# Patient Record
Sex: MALE | Race: WHITE | NOT HISPANIC OR LATINO | Employment: OTHER | URBAN - METROPOLITAN AREA
[De-identification: names, ages, dates, MRNs, and addresses within clinical notes are randomized per-mention and may not be internally consistent; named-entity substitution may affect disease eponyms.]

---

## 2023-09-02 PROBLEM — Z95.2 HISTORY OF AORTIC VALVE REPLACEMENT: Status: ACTIVE | Noted: 2023-09-02

## 2023-09-02 PROBLEM — I10 HYPERTENSION: Status: ACTIVE | Noted: 2023-09-02

## 2023-09-02 PROBLEM — I63.9 CEREBROVASCULAR ACCIDENT (CVA) (MULTI): Status: ACTIVE | Noted: 2023-09-02

## 2023-09-02 PROBLEM — M19.90 OSTEOARTHRITIS: Status: ACTIVE | Noted: 2023-09-02

## 2023-09-02 PROBLEM — H43.813 POSTERIOR VITREOUS DETACHMENT OF BOTH EYES: Status: ACTIVE | Noted: 2023-09-02

## 2023-09-02 PROBLEM — H25.813 MIXED TYPE AGE-RELATED CATARACT, BOTH EYES: Status: ACTIVE | Noted: 2023-09-02

## 2023-09-02 PROBLEM — D64.9 ANEMIA: Status: ACTIVE | Noted: 2023-09-02

## 2023-09-02 PROBLEM — Z97.3 PRESENCE OF SPECTACLES AND CONTACT LENSES: Status: ACTIVE | Noted: 2023-09-02

## 2023-09-02 PROBLEM — R00.2 PALPITATIONS: Status: ACTIVE | Noted: 2023-09-02

## 2023-09-02 PROBLEM — I35.0 AORTIC STENOSIS: Status: ACTIVE | Noted: 2023-09-02

## 2023-09-02 PROBLEM — E78.5 HYPERLIPIDEMIA: Status: ACTIVE | Noted: 2023-09-02

## 2023-09-02 PROBLEM — E78.00 HYPERCHOLESTEROLEMIA: Status: ACTIVE | Noted: 2023-09-02

## 2023-09-02 PROBLEM — T14.8XXA HEMATOMA: Status: ACTIVE | Noted: 2023-09-02

## 2023-09-02 PROBLEM — H25.9 AGE-RELATED CATARACT: Status: ACTIVE | Noted: 2023-09-02

## 2023-09-02 PROBLEM — H35.369 DRUSEN OF EYE: Status: ACTIVE | Noted: 2023-09-02

## 2023-09-02 PROBLEM — I35.9 AORTIC VALVE DISORDER: Status: ACTIVE | Noted: 2023-09-02

## 2023-09-02 PROBLEM — R73.03 PREDIABETES: Status: ACTIVE | Noted: 2023-09-02

## 2023-09-02 PROBLEM — R10.9 ABDOMINAL PAIN: Status: ACTIVE | Noted: 2023-09-02

## 2023-09-02 PROBLEM — R06.02 SHORTNESS OF BREATH: Status: ACTIVE | Noted: 2023-09-02

## 2023-09-02 PROBLEM — Z91.89 AT RISK FOR BLEEDING: Status: ACTIVE | Noted: 2023-09-02

## 2023-09-02 PROBLEM — H40.1190 POAG (PRIMARY OPEN-ANGLE GLAUCOMA): Status: ACTIVE | Noted: 2023-09-02

## 2023-09-02 PROBLEM — H40.003 GLAUCOMA SUSPECT, BOTH EYES: Status: ACTIVE | Noted: 2023-09-02

## 2023-09-02 PROBLEM — I48.91 ATRIAL FIBRILLATION (MULTI): Status: ACTIVE | Noted: 2023-09-02

## 2023-09-02 RX ORDER — WARFARIN 3 MG/1
3 TABLET ORAL
COMMUNITY
End: 2023-10-13

## 2023-09-02 RX ORDER — COLESEVELAM 180 1/1
1250 TABLET ORAL
COMMUNITY

## 2023-09-02 RX ORDER — PANTOPRAZOLE SODIUM 20 MG/1
20 TABLET, DELAYED RELEASE ORAL
COMMUNITY
End: 2023-10-13

## 2023-09-02 RX ORDER — WARFARIN SODIUM 5 MG/1
5 TABLET ORAL 3 TIMES WEEKLY
COMMUNITY
Start: 2013-04-26 | End: 2023-12-29 | Stop reason: HOSPADM

## 2023-09-02 RX ORDER — MULTIVITAMIN/IRON/FOLIC ACID 18MG-0.4MG
1 TABLET ORAL DAILY
COMMUNITY

## 2023-09-02 RX ORDER — ROSUVASTATIN CALCIUM 10 MG/1
5 TABLET, COATED ORAL NIGHTLY
COMMUNITY
End: 2023-10-13

## 2023-09-02 RX ORDER — METOPROLOL SUCCINATE 50 MG/1
50 TABLET, EXTENDED RELEASE ORAL DAILY
COMMUNITY
Start: 2013-04-08 | End: 2023-12-08 | Stop reason: DRUGHIGH

## 2023-09-02 RX ORDER — AMOXICILLIN AND CLAVULANATE POTASSIUM 875; 125 MG/1; MG/1
TABLET, FILM COATED ORAL
COMMUNITY
End: 2023-12-08 | Stop reason: ALTCHOICE

## 2023-09-02 RX ORDER — WARFARIN 2.5 MG/1
2.5 TABLET ORAL SEE ADMIN INSTRUCTIONS
Status: ON HOLD | COMMUNITY
End: 2023-12-29 | Stop reason: SDUPTHER

## 2023-09-02 RX ORDER — LOSARTAN POTASSIUM 100 MG/1
100 TABLET ORAL DAILY
COMMUNITY
Start: 2022-10-14

## 2023-09-11 PROBLEM — H26.9 CATARACT: Status: ACTIVE | Noted: 2023-09-11

## 2023-09-25 DIAGNOSIS — Z95.2 HISTORY OF AORTIC VALVE REPLACEMENT: Primary | ICD-10-CM

## 2023-09-25 DIAGNOSIS — Z79.01 LONG TERM (CURRENT) USE OF ANTICOAGULANTS: ICD-10-CM

## 2023-10-09 ENCOUNTER — ANTICOAGULATION - WARFARIN VISIT (OUTPATIENT)
Dept: CARDIOLOGY | Facility: CLINIC | Age: 88
End: 2023-10-09
Payer: MEDICARE

## 2023-10-09 DIAGNOSIS — Z79.01 LONG TERM (CURRENT) USE OF ANTICOAGULANTS: ICD-10-CM

## 2023-10-09 DIAGNOSIS — Z95.2 HISTORY OF AORTIC VALVE REPLACEMENT: Primary | ICD-10-CM

## 2023-10-09 LAB
POC INR: 3.3 (ref 2.5–3.5)
POC PROTHROMBIN TIME: NORMAL

## 2023-10-09 PROCEDURE — 99211 OFF/OP EST MAY X REQ PHY/QHP: CPT | Mod: 27 | Performed by: INTERNAL MEDICINE

## 2023-10-09 PROCEDURE — 85610 PROTHROMBIN TIME: CPT | Mod: QW

## 2023-10-13 ENCOUNTER — OFFICE VISIT (OUTPATIENT)
Dept: CARDIOLOGY | Facility: CLINIC | Age: 88
End: 2023-10-13
Payer: MEDICARE

## 2023-10-13 VITALS
WEIGHT: 171 LBS | OXYGEN SATURATION: 97 % | SYSTOLIC BLOOD PRESSURE: 123 MMHG | BODY MASS INDEX: 25.91 KG/M2 | HEART RATE: 73 BPM | DIASTOLIC BLOOD PRESSURE: 63 MMHG | HEIGHT: 68 IN

## 2023-10-13 DIAGNOSIS — T82.897D REGURGITATION OF PROSTHETIC VALVE, SUBSEQUENT ENCOUNTER: Primary | ICD-10-CM

## 2023-10-13 DIAGNOSIS — Z09 FOLLOW-UP VISIT FOR AORTIC VALVE REPLACEMENT WITH BIOPROSTHETIC VALVE: ICD-10-CM

## 2023-10-13 DIAGNOSIS — Z95.3 FOLLOW-UP VISIT FOR AORTIC VALVE REPLACEMENT WITH BIOPROSTHETIC VALVE: ICD-10-CM

## 2023-10-13 PROCEDURE — 1126F AMNT PAIN NOTED NONE PRSNT: CPT | Performed by: NURSE PRACTITIONER

## 2023-10-13 PROCEDURE — 1160F RVW MEDS BY RX/DR IN RCRD: CPT | Performed by: NURSE PRACTITIONER

## 2023-10-13 PROCEDURE — 1159F MED LIST DOCD IN RCRD: CPT | Performed by: NURSE PRACTITIONER

## 2023-10-13 PROCEDURE — 99214 OFFICE O/P EST MOD 30 MIN: CPT | Performed by: NURSE PRACTITIONER

## 2023-10-13 PROCEDURE — 1036F TOBACCO NON-USER: CPT | Performed by: NURSE PRACTITIONER

## 2023-10-13 PROCEDURE — 3074F SYST BP LT 130 MM HG: CPT | Performed by: NURSE PRACTITIONER

## 2023-10-13 PROCEDURE — 3078F DIAST BP <80 MM HG: CPT | Performed by: NURSE PRACTITIONER

## 2023-10-13 ASSESSMENT — PATIENT HEALTH QUESTIONNAIRE - PHQ9
1. LITTLE INTEREST OR PLEASURE IN DOING THINGS: NOT AT ALL
2. FEELING DOWN, DEPRESSED OR HOPELESS: NOT AT ALL
SUM OF ALL RESPONSES TO PHQ9 QUESTIONS 1 AND 2: 0

## 2023-10-13 ASSESSMENT — ENCOUNTER SYMPTOMS
MUSCULOSKELETAL NEGATIVE: 1
GASTROINTESTINAL NEGATIVE: 1
RESPIRATORY NEGATIVE: 1
CARDIOVASCULAR NEGATIVE: 1
NEUROLOGICAL NEGATIVE: 1
CONSTITUTIONAL NEGATIVE: 1
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
## 2023-11-06 ENCOUNTER — ANTICOAGULATION - WARFARIN VISIT (OUTPATIENT)
Dept: CARDIOLOGY | Facility: CLINIC | Age: 88
End: 2023-11-06
Payer: MEDICARE

## 2023-11-06 DIAGNOSIS — Z79.01 LONG TERM (CURRENT) USE OF ANTICOAGULANTS: ICD-10-CM

## 2023-11-06 DIAGNOSIS — Z95.2 HISTORY OF AORTIC VALVE REPLACEMENT: Primary | ICD-10-CM

## 2023-11-06 LAB
POC INR: 3
POC PROTHROMBIN TIME: NORMAL

## 2023-11-06 PROCEDURE — 99211 OFF/OP EST MAY X REQ PHY/QHP: CPT | Performed by: INTERNAL MEDICINE

## 2023-11-06 PROCEDURE — 85610 PROTHROMBIN TIME: CPT | Mod: QW

## 2023-11-06 NOTE — PROGRESS NOTES
Patient identification verified with 2 identifiers.    Location: [facility] - [address] [place], [place] [address] [phone]     Referring Physician: Dr. Anirudh Rehman  Enrollment/ Re-enrollment date: 5/22/2024   INR Goal: 2.5-3.5  INR monitoring is per [facility] protocol.  Anticoagulation Medication: warfarin  Indication: Aortic Valve replacement    Subjective   Bleeding signs/symptoms: No    Bruising: No   Major bleeding event: No  Thrombosis signs/symptoms: No  Thromboembolic event: No  Missed doses: No  Extra doses: No  Medication changes: No  Dietary changes: No  Change in health: No  Change in activity: No  Alcohol: No  Other concerns: No    Upcoming Surgeries:  Does the Patient Have any upcoming surgeries that require interruption in anticoagulation therapy? no  Does the patient require bridging? no      Anticoagulation Summary  As of 11/6/2023      INR goal:  2.5-3.5   TTR:  100.0 % (1.1 mo)   INR used for dosing:  3.00 (11/6/2023)   Weekly warfarin total:  25 mg               Assessment/Plan   Therapeutic     1. New dose: no change    2. Next INR: 1 month      Education provided to patient during the visit:  Patient instructed to call in interim with questions, concerns and changes.   Patient educated on interactions between medications and warfarin.   Patient educated on dietary consistency in vitamin k consumption.   Patient educated on affects of alcohol consumption while taking warfarin.   Patient educated on signs of bleeding/clotting.   Patient educated on compliance with dosing, follow up appointments, and prescribed plan of care.

## 2023-11-20 ENCOUNTER — LAB (OUTPATIENT)
Dept: LAB | Facility: LAB | Age: 88
End: 2023-11-20
Payer: MEDICARE

## 2023-11-20 DIAGNOSIS — E78.5 HYPERLIPIDEMIA, UNSPECIFIED: ICD-10-CM

## 2023-11-20 DIAGNOSIS — R73.03 PREDIABETES: ICD-10-CM

## 2023-11-20 DIAGNOSIS — I10 ESSENTIAL (PRIMARY) HYPERTENSION: ICD-10-CM

## 2023-11-20 DIAGNOSIS — M19.90 UNSPECIFIED OSTEOARTHRITIS, UNSPECIFIED SITE: Primary | ICD-10-CM

## 2023-11-20 LAB
ALBUMIN SERPL-MCNC: 4.5 G/DL (ref 3.5–5)
ALP BLD-CCNC: 98 U/L (ref 35–125)
ALT SERPL-CCNC: 18 U/L (ref 5–40)
ANION GAP SERPL CALC-SCNC: 15 MMOL/L
AST SERPL-CCNC: 23 U/L (ref 5–40)
BILIRUB SERPL-MCNC: 0.5 MG/DL (ref 0.1–1.2)
BUN SERPL-MCNC: 16 MG/DL (ref 8–25)
CALCIUM SERPL-MCNC: 9 MG/DL (ref 8.5–10.4)
CHLORIDE SERPL-SCNC: 106 MMOL/L (ref 97–107)
CHOLEST SERPL-MCNC: 188 MG/DL (ref 133–200)
CHOLEST/HDLC SERPL: 3.6 {RATIO}
CO2 SERPL-SCNC: 22 MMOL/L (ref 24–31)
CREAT SERPL-MCNC: 1.1 MG/DL (ref 0.4–1.6)
ERYTHROCYTE [DISTWIDTH] IN BLOOD BY AUTOMATED COUNT: 13.2 % (ref 11.5–14.5)
EST. AVERAGE GLUCOSE BLD GHB EST-MCNC: 105 MG/DL
GFR SERPL CREATININE-BSD FRML MDRD: 65 ML/MIN/1.73M*2
GLUCOSE SERPL-MCNC: 89 MG/DL (ref 65–99)
HBA1C MFR BLD: 5.3 %
HCT VFR BLD AUTO: 43.7 % (ref 41–52)
HDLC SERPL-MCNC: 52 MG/DL
HGB BLD-MCNC: 14.5 G/DL (ref 13.5–17.5)
LDLC SERPL CALC-MCNC: 114 MG/DL (ref 65–130)
MCH RBC QN AUTO: 31.3 PG (ref 26–34)
MCHC RBC AUTO-ENTMCNC: 33.2 G/DL (ref 32–36)
MCV RBC AUTO: 94 FL (ref 80–100)
NRBC BLD-RTO: 0 /100 WBCS (ref 0–0)
PLATELET # BLD AUTO: 269 X10*3/UL (ref 150–450)
POTASSIUM SERPL-SCNC: 4.3 MMOL/L (ref 3.4–5.1)
PROT SERPL-MCNC: 6.9 G/DL (ref 5.9–7.9)
RBC # BLD AUTO: 4.63 X10*6/UL (ref 4.5–5.9)
SODIUM SERPL-SCNC: 143 MMOL/L (ref 133–145)
TRIGL SERPL-MCNC: 110 MG/DL (ref 40–150)
WBC # BLD AUTO: 8 X10*3/UL (ref 4.4–11.3)

## 2023-11-20 PROCEDURE — 80061 LIPID PANEL: CPT

## 2023-11-20 PROCEDURE — 80053 COMPREHEN METABOLIC PANEL: CPT

## 2023-11-20 PROCEDURE — 83036 HEMOGLOBIN GLYCOSYLATED A1C: CPT

## 2023-11-20 PROCEDURE — 85027 COMPLETE CBC AUTOMATED: CPT

## 2023-11-20 PROCEDURE — 36415 COLL VENOUS BLD VENIPUNCTURE: CPT

## 2023-12-04 ENCOUNTER — APPOINTMENT (OUTPATIENT)
Dept: CARDIOLOGY | Facility: CLINIC | Age: 88
End: 2023-12-04
Payer: MEDICARE

## 2023-12-06 ENCOUNTER — ANTICOAGULATION - WARFARIN VISIT (OUTPATIENT)
Dept: CARDIOLOGY | Facility: CLINIC | Age: 88
End: 2023-12-06
Payer: MEDICARE

## 2023-12-06 DIAGNOSIS — Z95.2 HISTORY OF AORTIC VALVE REPLACEMENT: Primary | ICD-10-CM

## 2023-12-06 DIAGNOSIS — Z79.01 LONG TERM (CURRENT) USE OF ANTICOAGULANTS: ICD-10-CM

## 2023-12-06 LAB
POC INR: 6.1
POC PROTHROMBIN TIME: NORMAL

## 2023-12-06 PROCEDURE — 99211 OFF/OP EST MAY X REQ PHY/QHP: CPT | Mod: 27 | Performed by: INTERNAL MEDICINE

## 2023-12-06 PROCEDURE — 85610 PROTHROMBIN TIME: CPT | Mod: QW

## 2023-12-06 NOTE — PROGRESS NOTES
Patient identification verified with 2 identifiers.    Location: [facility] - [address] [place] [id number] [phone]     Referring Physician: Dr. KAYLEY Rehman  Enrollment/ Re-enrollment date: 2024   INR Goal: 2.5-3.5  INR monitoring is per [facility] protocol.  Anticoagulation Medication: warfarin  Indication:  Aortic Valve replacement    Subjective   Bleeding signs/symptoms: No    Bruising: No   Major bleeding event: No  Thrombosis signs/symptoms: No  Thromboembolic event: No  Missed doses: No  Extra doses: No  Medication changes: Yes  Cortisone shot 2 days ago.  Dietary changes: No  Change in health: No  Change in activity: No  Alcohol: No  Other concerns: No    Upcoming Surgeries:  Does the Patient Have any upcoming surgeries that require interruption in anticoagulation therapy? no  Does the patient require bridging? no      Anticoagulation Summary  As of 2023      INR goal:  2.5-3.5   TTR:  59.7 % (2.1 mo)   INR used for dosin.10 (2023)   Weekly warfarin total:  25 mg               Assessment/Plan   Supratherapeutic     1. New dose:  Hold  and      2. Next INR:  2 days.      Education provided to patient during the visit:  Patient instructed to call in interim with questions, concerns and changes.   Patient educated on interactions between medications and warfarin.   Patient educated on dietary consistency in vitamin k consumption.   Patient educated on affects of alcohol consumption while taking warfarin.   Patient educated on signs of bleeding/clotting.   Patient educated on compliance with dosing, follow up appointments, and prescribed plan of care.

## 2023-12-07 PROBLEM — I63.9 CEREBROVASCULAR ACCIDENT (CVA) (MULTI): Status: RESOLVED | Noted: 2023-09-02 | Resolved: 2023-12-07

## 2023-12-07 PROBLEM — T14.8XXA HEMATOMA: Status: RESOLVED | Noted: 2023-09-02 | Resolved: 2023-12-07

## 2023-12-07 PROBLEM — R00.2 PALPITATIONS: Status: RESOLVED | Noted: 2023-09-02 | Resolved: 2023-12-07

## 2023-12-07 PROBLEM — R06.02 SHORTNESS OF BREATH: Status: RESOLVED | Noted: 2023-09-02 | Resolved: 2023-12-07

## 2023-12-07 PROBLEM — R10.9 ABDOMINAL PAIN: Status: RESOLVED | Noted: 2023-09-02 | Resolved: 2023-12-07

## 2023-12-07 NOTE — PROGRESS NOTES
[facility]: [facility]  MEDICARE WELLNESS EXAM      Jose Martin Fleming is a 88 y.o. male that is presenting today for Annual Exam.    Assessment/Plan    Diagnoses and all orders for this visit:  Annual physical exam  Chronic atrial fibrillation (CMS/HCC)  Hypercholesterolemia  Primary hypertension  Primary osteoarthritis involving multiple joints  Aortic valve disorder  Prediabetes  Long term (current) use of anticoagulants  Aortic valve stenosis, etiology of cardiac valve disease unspecified  Anemia, unspecified type  History of CVA (cerebrovascular accident) without residual deficits  We completed the medicare wellness exam today.  The lifestyle is reviewed and recommendations for diet and exercise are made. We reviewed the immunizations and cancer screening and recommendations for needed immunizations and screenings are made.  The patient is independent in all ADL, shows no clinical signs of cognitive impairment, and is not falling.    We reviewed his immunizations together.  He had a he had Prevnar 20 and 2022 Shingrix x 2 in 2019 Tdap in 2018 and a recent COVID-vaccine.  He will get his flu shot today but otherwise he is up-to-date on immunizations other than the need for an RSV vaccine.  We also reviewed the blood work today which really looks quite excellent.    There were 2 interesting findings.  First of all [facility] they had done an ankle brachial index which was 0.84 on the right and 1.0 on the left.  Based on this they diagnosed him with peripheral vascular disease.  We have talked about this together.  Carefully he really has no symptoms of claudication in the legs.  Therefore no further intervention will be needed.    Also we noted that his heart rate in the office today was in the 120s.  His EKG shows the rhythm is A-fib but he is really totally asymptomatic in that regard right now.  He only is taking metoprolol XL 50 mg  daily so the simplest maneuver here would be to increase this to 100 mg daily.  He will be keeping his follow-up with . Ori will let us know if there is any problems.    Unless there are other issues I will just plan on seeing him in a year for his annual wellness visit.    ADVANCED CARE PLANNING  Advanced Care Planning was discussed with patient:  The patient has an active advanced care plan on file. The patient has an active surrogate decision-maker on file.  Encouraged the patient to confirm that Living Will and Healthcare Power of  (HCPoA) are accurate and up to date.  Encouraged the patient to confirm that our office be provided a copy of any documentation in the event that anything changes.      ACTIVITIES OF DAILY LIVING  Basic ADLs:  Bathing: Independent, Dressing: Independent, Toileting: Independent, Transferring: Independent, Continence: Independent, Feeding: Independent.    Instrumental ADLs:  Ability to use phone: Independent, Shopping: Independent, Cooking: Independent, House-keeping: Independent, Laundry: Independent, Transportation: Independent, Medication Management: Independent, Finance Management: Independent.    Subjective   Here for medicare wellness exam and follow up - doing prety well right now - also sees dr garcia regularly and cgoes to the coumadin clinic      Review of Systems   Constitutional: Negative.    HENT: Negative.     Eyes: Negative.    Respiratory: Negative.     Cardiovascular: Negative.    Gastrointestinal: Negative.    Endocrine: Negative.    Genitourinary: Negative.    Musculoskeletal:  Positive for arthralgias.        Knee pain - just had injection for such   Skin: Negative.    Allergic/Immunologic: Negative.    Neurological: Negative.    Hematological: Negative.    Psychiatric/Behavioral: Negative.     All other systems reviewed and are negative.    Objective   Vitals:    12/08/23 0735   BP: 130/81   Pulse: (!) 127   SpO2: 95%      Body mass index is 25.54  kg/m².  Physical Exam  Vitals and nursing note reviewed.   Constitutional:       General: He is not in acute distress.     Appearance: Normal appearance. He is not ill-appearing.   HENT:      Head: Normocephalic and atraumatic.      Right Ear: Tympanic membrane, ear canal and external ear normal. There is no impacted cerumen.      Left Ear: Tympanic membrane, ear canal and external ear normal. There is no impacted cerumen.      Nose: Nose normal.      Mouth/Throat:      Mouth: Mucous membranes are moist.      Pharynx: Oropharynx is clear. No oropharyngeal exudate or posterior oropharyngeal erythema.   Eyes:      General: No scleral icterus.        Right eye: No discharge.         Left eye: No discharge.      Extraocular Movements: Extraocular movements intact.      Conjunctiva/sclera: Conjunctivae normal.      Pupils: Pupils are equal, round, and reactive to light.   Neck:      Vascular: No carotid bruit.   Cardiovascular:      Rate and Rhythm: Normal rate and regular rhythm.      Pulses: Normal pulses.      Heart sounds: Normal heart sounds. No murmur heard.     No friction rub. No gallop.   Pulmonary:      Effort: Pulmonary effort is normal. No respiratory distress.      Breath sounds: Normal breath sounds.   Abdominal:      General: Abdomen is flat. Bowel sounds are normal.      Palpations: Abdomen is soft.      Tenderness: There is no abdominal tenderness.      Hernia: No hernia is present.   Musculoskeletal:         General: No swelling. Normal range of motion.      Cervical back: Normal range of motion.   Lymphadenopathy:      Cervical: No cervical adenopathy.   Skin:     General: Skin is warm and dry.      Coloration: Skin is not jaundiced.      Findings: No rash.   Neurological:      General: No focal deficit present.      Mental Status: He is alert and oriented to person, place, and time. Mental status is at baseline.   Psychiatric:         Mood and Affect: Mood normal.         Behavior: Behavior normal.  "      Diagnostic Results   Lab Results   Component Value Date    GLUCOSE 89 11/20/2023    CALCIUM 9.0 11/20/2023     11/20/2023    K 4.3 11/20/2023    CO2 22 (L) 11/20/2023     11/20/2023    BUN 16 11/20/2023    CREATININE 1.10 11/20/2023     Lab Results   Component Value Date    ALT 18 11/20/2023    AST 23 11/20/2023    ALKPHOS 98 11/20/2023    BILITOT 0.5 11/20/2023     Lab Results   Component Value Date    WBC 8.0 11/20/2023    HGB 14.5 11/20/2023    HCT 43.7 11/20/2023    MCV 94 11/20/2023     11/20/2023     Lab Results   Component Value Date    CHOL 188 11/20/2023    CHOL 188 12/01/2022    CHOL 217 (H) 11/30/2021     Lab Results   Component Value Date    HDL 52.0 11/20/2023    HDL 44 12/01/2022    HDL 47 11/30/2021     Lab Results   Component Value Date    LDLCALC 114 11/20/2023    LDLCALC 123 12/01/2022    LDLCALC 147 (H) 11/30/2021     Lab Results   Component Value Date    TRIG 110 11/20/2023    TRIG 103 12/01/2022    TRIG 115 11/30/2021     No components found for: \"CHOLHDL\"  Lab Results   Component Value Date    HGBA1C 5.3 11/20/2023     Other labs not included in the list above reviewed either before or during this encounter.    History   Past Medical History:   Diagnosis Date    Anemia 09/02/2023    Aortic stenosis 09/02/2023    Aortic valve disorder 09/02/2023    At risk for bleeding 09/02/2023    Atrial fibrillation (CMS/HCC) 09/02/2023    Cerebrovascular accident (CVA) (CMS/HCC) 09/02/2023    History of aortic valve replacement 09/02/2023    Hypercholesterolemia 09/02/2023    Hypertension 09/02/2023    Long term (current) use of anticoagulants 09/25/2023    Personal history of other diseases of the circulatory system 07/18/2014    History of aortic valve disorder    Personal history of other diseases of the nervous system and sense organs     History of cataract    Prediabetes 09/02/2023    Preglaucoma, unspecified, unspecified eye     Glaucoma suspect     Past Surgical History: "   Procedure Laterality Date    ANKLE FRACTURE SURGERY  1989    APPENDECTOMY  10/08/2013    Appendectomy    COLONOSCOPY  2010    IR EMBOLIZATION  09/09/2021    IR EMBOLIZATION [facility]    MECHANICAL AORTIC VALVE REPLACEMENT  1985    [facility]    TOTAL HIP ARTHROPLASTY Left 1999    TOTAL HIP ARTHROPLASTY Right 2005     Family History   Problem Relation Name Age of Onset    Heart attack Father      Sudden death Father          Cardiac    Diabetes Brother       Social History     Socioeconomic History    Marital status:      Spouse name: Not on file    Number of children: Not on file    Years of education: Not on file    Highest education level: Not on file   Occupational History    Not on file   Tobacco Use    Smoking status: Former     Packs/day: 1.00     Years: 53.00     Additional pack years: 0.00     Total pack years: 53.00     Types: Cigarettes    Smokeless tobacco: Never   Substance and Sexual Activity    Alcohol use: Not Currently    Drug use: Never    Sexual activity: Not on file   Other Topics Concern    Not on file   Social History Narrative    Not on file     Social Determinants of Health     Financial Resource Strain: Not on file   Food Insecurity: Not on file   Transportation Needs: Not on file   Physical Activity: Not on file   Stress: Not on file   Social Connections: Not on file   Intimate Partner Violence: Not on file   Housing Stability: Not on file     Allergies   Allergen Reactions    Rosuvastatin Other     Muscle aches     Current Outpatient Medications on File Prior to Visit   Medication Sig Dispense Refill    aspirin (ASPIR-81 ORAL) Take 1 tablet by mouth once daily.      b complex 0.4 mg tablet Take 1 tablet by mouth once daily.      clotrimazole-betamethasone (Lotrisone) cream Apply 1 Application topically 2 times a day.      colesevelam (WelChoL) 625 mg tablet Take 2 tablets (1,250 mg) by mouth once daily at noon. Take with meals.      losartan (Cozaar) 100 mg tablet Take 1  tablet (100 mg) by mouth once daily.      metoprolol succinate XL (Toprol XL) 50 mg 24 hr tablet Take 1 tablet (50 mg) by mouth once daily.      warfarin (Coumadin) 2.5 mg tablet Take 1 tablet (2.5 mg) by mouth see administration instructions. 1 tablet oral every Tuesday, Thursday, Saturday, Sunday      warfarin (Coumadin) 5 mg tablet Take 1 tablet (5 mg) by mouth 3 (three) times a week.      ZINC ACETATE ORAL Take 1 tablet by mouth once daily.      [DISCONTINUED] amoxicillin-pot clavulanate (Augmentin) 875-125 mg tablet       [DISCONTINUED] ergocalciferol, vitamin D2, (VITAMIN D2 ORAL) Take 1 tablet by mouth once daily.       No current facility-administered medications on file prior to visit.     Immunization History   Administered Date(s) Administered    Flu vaccine, quadrivalent, high-dose, preservative free, age 65y+ (FLUZONE) 09/24/2020, 12/01/2021, 12/02/2022    Influenza, High Dose Seasonal, Preservative Free 09/10/2013, 11/11/2016, 10/04/2017, 11/19/2018, 11/20/2019    Influenza, seasonal, injectable 10/15/2014, 10/08/2015    Moderna SARS-CoV-2 Booster 09/20/2023    Moderna SARS-CoV-2 Vaccination 01/20/2021, 02/17/2021    Pfizer COVID-19 vaccine, bivalent, age 12 years and older (30 mcg/0.3 mL) 09/09/2022    Pfizer Gray Cap SARS-CoV-2 04/06/2022    Pfizer Purple Cap SARS-CoV-2 10/25/2021    Pneumococcal conjugate vaccine, 13-valent (PREVNAR 13) 11/27/2015    Pneumococcal conjugate vaccine, 20-valent (PREVNAR 20) 12/02/2022    Pneumococcal polysaccharide vaccine, 23-valent, age 2 years and older (PNEUMOVAX 23) 06/18/2009, 10/15/2014    Td vaccine, age 7 years and older (TDVAX) 06/16/2008    Tdap vaccine, age 7 year and older (BOOSTRIX) 11/19/2018    Zoster vaccine, recombinant, adult (SHINGRIX) 06/05/2019, 08/14/2019    Zoster, live 06/24/2009     Patient's medical history was reviewed and updated either before or during this encounter.     Ryan Gonzalez MD

## 2023-12-08 ENCOUNTER — ANTICOAGULATION - WARFARIN VISIT (OUTPATIENT)
Dept: CARDIOLOGY | Facility: CLINIC | Age: 88
End: 2023-12-08
Payer: MEDICARE

## 2023-12-08 ENCOUNTER — OFFICE VISIT (OUTPATIENT)
Dept: PRIMARY CARE | Facility: CLINIC | Age: 88
End: 2023-12-08
Payer: MEDICARE

## 2023-12-08 VITALS
BODY MASS INDEX: 25.54 KG/M2 | SYSTOLIC BLOOD PRESSURE: 130 MMHG | WEIGHT: 168 LBS | DIASTOLIC BLOOD PRESSURE: 81 MMHG | HEART RATE: 127 BPM | OXYGEN SATURATION: 95 %

## 2023-12-08 DIAGNOSIS — I48.20 CHRONIC ATRIAL FIBRILLATION (MULTI): ICD-10-CM

## 2023-12-08 DIAGNOSIS — Z95.2 HISTORY OF AORTIC VALVE REPLACEMENT: Primary | ICD-10-CM

## 2023-12-08 DIAGNOSIS — Z01.89 ENCOUNTER FOR ROUTINE LABORATORY TESTING: ICD-10-CM

## 2023-12-08 DIAGNOSIS — Z00.00 ANNUAL PHYSICAL EXAM: Primary | ICD-10-CM

## 2023-12-08 DIAGNOSIS — E78.00 HYPERCHOLESTEROLEMIA: ICD-10-CM

## 2023-12-08 DIAGNOSIS — I35.0 AORTIC VALVE STENOSIS, ETIOLOGY OF CARDIAC VALVE DISEASE UNSPECIFIED: ICD-10-CM

## 2023-12-08 DIAGNOSIS — Z79.01 LONG TERM (CURRENT) USE OF ANTICOAGULANTS: ICD-10-CM

## 2023-12-08 DIAGNOSIS — R73.03 PREDIABETES: ICD-10-CM

## 2023-12-08 DIAGNOSIS — D64.9 ANEMIA, UNSPECIFIED TYPE: ICD-10-CM

## 2023-12-08 DIAGNOSIS — I10 PRIMARY HYPERTENSION: ICD-10-CM

## 2023-12-08 DIAGNOSIS — I35.9 AORTIC VALVE DISORDER: ICD-10-CM

## 2023-12-08 DIAGNOSIS — R73.9 HYPERGLYCEMIA: ICD-10-CM

## 2023-12-08 DIAGNOSIS — M15.9 PRIMARY OSTEOARTHRITIS INVOLVING MULTIPLE JOINTS: ICD-10-CM

## 2023-12-08 DIAGNOSIS — I73.9 PERIPHERAL VASCULAR DISEASE (CMS-HCC): ICD-10-CM

## 2023-12-08 DIAGNOSIS — Z86.73 HISTORY OF CVA (CEREBROVASCULAR ACCIDENT) WITHOUT RESIDUAL DEFICITS: ICD-10-CM

## 2023-12-08 LAB
POC INR: 2.6
POC PROTHROMBIN TIME: NORMAL

## 2023-12-08 PROCEDURE — 85610 PROTHROMBIN TIME: CPT | Mod: QW

## 2023-12-08 PROCEDURE — 3079F DIAST BP 80-89 MM HG: CPT | Performed by: INTERNAL MEDICINE

## 2023-12-08 PROCEDURE — 99211 OFF/OP EST MAY X REQ PHY/QHP: CPT | Mod: 27 | Performed by: INTERNAL MEDICINE

## 2023-12-08 PROCEDURE — 1036F TOBACCO NON-USER: CPT | Performed by: INTERNAL MEDICINE

## 2023-12-08 PROCEDURE — 1160F RVW MEDS BY RX/DR IN RCRD: CPT | Performed by: INTERNAL MEDICINE

## 2023-12-08 PROCEDURE — 3075F SYST BP GE 130 - 139MM HG: CPT | Performed by: INTERNAL MEDICINE

## 2023-12-08 PROCEDURE — 1159F MED LIST DOCD IN RCRD: CPT | Performed by: INTERNAL MEDICINE

## 2023-12-08 PROCEDURE — G0439 PPPS, SUBSEQ VISIT: HCPCS | Performed by: INTERNAL MEDICINE

## 2023-12-08 PROCEDURE — 93010 ELECTROCARDIOGRAM REPORT: CPT | Performed by: INTERNAL MEDICINE

## 2023-12-08 PROCEDURE — G0008 ADMIN INFLUENZA VIRUS VAC: HCPCS | Performed by: INTERNAL MEDICINE

## 2023-12-08 PROCEDURE — 1125F AMNT PAIN NOTED PAIN PRSNT: CPT | Performed by: INTERNAL MEDICINE

## 2023-12-08 PROCEDURE — 90662 IIV NO PRSV INCREASED AG IM: CPT | Performed by: INTERNAL MEDICINE

## 2023-12-08 RX ORDER — METOPROLOL SUCCINATE 50 MG/1
100 TABLET, EXTENDED RELEASE ORAL DAILY
Qty: 180 TABLET | Refills: 3 | Status: SHIPPED | OUTPATIENT
Start: 2023-12-08 | End: 2023-12-29 | Stop reason: HOSPADM

## 2023-12-08 RX ORDER — CLOTRIMAZOLE AND BETAMETHASONE DIPROPIONATE 10; .64 MG/G; MG/G
1 CREAM TOPICAL 2 TIMES DAILY
COMMUNITY

## 2023-12-08 ASSESSMENT — PATIENT HEALTH QUESTIONNAIRE - PHQ9
SUM OF ALL RESPONSES TO PHQ9 QUESTIONS 1 AND 2: 0
1. LITTLE INTEREST OR PLEASURE IN DOING THINGS: NOT AT ALL
2. FEELING DOWN, DEPRESSED OR HOPELESS: NOT AT ALL

## 2023-12-08 ASSESSMENT — PAIN SCALES - GENERAL: PAINLEVEL: 2

## 2023-12-08 ASSESSMENT — ENCOUNTER SYMPTOMS
DEPRESSION: 0
OCCASIONAL FEELINGS OF UNSTEADINESS: 1
ARTHRALGIAS: 1
GASTROINTESTINAL NEGATIVE: 1
EYES NEGATIVE: 1
PSYCHIATRIC NEGATIVE: 1
CONSTITUTIONAL NEGATIVE: 1
LOSS OF SENSATION IN FEET: 0
NEUROLOGICAL NEGATIVE: 1
RESPIRATORY NEGATIVE: 1
CARDIOVASCULAR NEGATIVE: 1
HEMATOLOGIC/LYMPHATIC NEGATIVE: 1
ALLERGIC/IMMUNOLOGIC NEGATIVE: 1
ENDOCRINE NEGATIVE: 1

## 2023-12-08 NOTE — PATIENT INSTRUCTIONS
We were able to complete your annual wellness visit today.  As you know we reviewed your immunizations.  You had urine Prevnar 20 pneumonia vaccine in 2022 and your shingles vaccines in 2019.  These vaccines are completed now for life as the recommendations stand.  In terms of your Tdap which is your tetanus and whooping cough vaccine you had that in 2018 and would not be due for that again until 2028.  I know you said you recently got your COVID-vaccine which is great and we will give you a flu shot today.  You could investigate getting the RSV vaccine at your local pharmacy    In terms of your chronic medical problems it looks like you are doing really well.  Your blood work all looks great with no changes needed.  Because your heart rate was fine as we discussed lets go ahead and increase your metoprolol XL 50 mg from 1 pill daily to 2 pills daily.  This should help keep that heart rate down under 100 hopefully.  Please let us know if this is causing any low heart rates or any other symptoms such as dizziness or fatigue.  Dr. Rehman would be very interested in that as well I will plan on seeing you next year unless he needs me.

## 2023-12-08 NOTE — PROGRESS NOTES
Patient identification verified with 2 identifiers.    Location: [facility] - [address] [place] [id number] [phone]     Referring Physician: Dr. Anirudh Rehman  Enrollment/ Re-enrollment date: 2024   INR Goal: 2.5-3.5  INR monitoring is per [facility] protocol.  Anticoagulation Medication: warfarin  Indication: Aortic Valve Replacement    Subjective   Bleeding signs/symptoms: No    Bruising: No   Major bleeding event: No  Thrombosis signs/symptoms: No  Thromboembolic event: No  Missed doses: No  Extra doses: No  Medication changes: No  Dietary changes: No  Change in health: No  Change in activity: No  Alcohol: No  Other concerns: No    Upcoming Surgeries:  Does the Patient Have any upcoming surgeries that require interruption in anticoagulation therapy? no  Does the patient require bridging? no      Anticoagulation Summary  As of 2023      INR goal:  2.5-3.5   TTR:  58.5 % (2.1 mo)   INR used for dosin.60 (2023)   Weekly warfarin total:  25 mg               Assessment/Plan   Therapeutic     1. New dose: no change    2. Next INR: 1 week      Education provided to patient during the visit:  Patient instructed to call in interim with questions, concerns and changes.   Patient educated on interactions between medications and warfarin.   Patient educated on dietary consistency in vitamin k consumption.   Patient educated on affects of alcohol consumption while taking warfarin.   Patient educated on signs of bleeding/clotting.   Patient educated on compliance with dosing, follow up appointments, and prescribed plan of care.

## 2023-12-18 ENCOUNTER — ANTICOAGULATION - WARFARIN VISIT (OUTPATIENT)
Dept: CARDIOLOGY | Facility: CLINIC | Age: 88
End: 2023-12-18
Payer: MEDICARE

## 2023-12-18 DIAGNOSIS — Z79.01 LONG TERM (CURRENT) USE OF ANTICOAGULANTS: ICD-10-CM

## 2023-12-18 DIAGNOSIS — Z95.2 HISTORY OF AORTIC VALVE REPLACEMENT: Primary | ICD-10-CM

## 2023-12-18 LAB
POC INR: 8
POC PROTHROMBIN TIME: NORMAL

## 2023-12-18 PROCEDURE — 99211 OFF/OP EST MAY X REQ PHY/QHP: CPT | Performed by: INTERNAL MEDICINE

## 2023-12-18 PROCEDURE — 85610 PROTHROMBIN TIME: CPT | Mod: QW

## 2023-12-18 NOTE — PROGRESS NOTES
Patient identification verified with 2 identifiers.    Location: [facility] - [address] [place], [place] [address] [phone]     Referring Physician: Dr. Anirudh Rehman  Enrollment/ Re-enrollment date: 5/22/2024   INR Goal: 2.5-3.5  INR monitoring is per [facility] protocol.  Anticoagulation Medication: warfarin  Indication: Aortic Valve Replacement    Subjective   Bleeding signs/symptoms: No    Bruising: No   Major bleeding event: No  Thrombosis signs/symptoms: No  Thromboembolic event: No  Missed doses: No  Extra doses: No  Medication changes: Yes  Pt stopped Tumeric  Dietary changes: No  Change in health: No  Change in activity: No  Alcohol: No  Other concerns: No    Upcoming Surgeries:  Does the Patient Have any upcoming surgeries that require interruption in anticoagulation therapy? no  Does the patient require bridging? no      Anticoagulation Summary  As of 12/18/2023      INR goal:  2.5-3.5   TTR:  58.5 % (2.1 mo)   INR used for dosing:                 Assessment/Plan   Supratherapeutic     1. New dose:  Hold Warfarin 12/18, 12/19 & 12/20     2. Next INR:  4 days.      Education provided to patient during the visit:  Patient instructed to call in interim with questions, concerns and changes.   Patient educated on interactions between medications and warfarin.   Patient educated on dietary consistency in vitamin k consumption.   Patient educated on affects of alcohol consumption while taking warfarin.   Patient educated on signs of bleeding/clotting.   Patient educated on compliance with dosing, follow up appointments, and prescribed plan of care.

## 2023-12-22 ENCOUNTER — ANTICOAGULATION - WARFARIN VISIT (OUTPATIENT)
Dept: CARDIOLOGY | Facility: CLINIC | Age: 88
End: 2023-12-22
Payer: MEDICARE

## 2023-12-22 DIAGNOSIS — Z79.01 LONG TERM (CURRENT) USE OF ANTICOAGULANTS: ICD-10-CM

## 2023-12-22 DIAGNOSIS — Z95.2 HISTORY OF AORTIC VALVE REPLACEMENT: Primary | ICD-10-CM

## 2023-12-22 LAB
POC INR: 3.1
POC PROTHROMBIN TIME: NORMAL

## 2023-12-22 PROCEDURE — 99211 OFF/OP EST MAY X REQ PHY/QHP: CPT | Performed by: INTERNAL MEDICINE

## 2023-12-22 PROCEDURE — 85610 PROTHROMBIN TIME: CPT | Mod: QW

## 2023-12-22 NOTE — PROGRESS NOTES
Patient identification verified with 2 identifiers.    Location: [facility] - [address] [place] [id number] [phone]     Referring Physician: Dr. Anirudh Rehman  Enrollment/ Re-enrollment date: 5/22/2024   INR Goal: 2.5-3.5  INR monitoring is per [facility] protocol.  Anticoagulation Medication: warfarin  Indication: Aortic Valve Replacement    Subjective   Bleeding signs/symptoms: No    Bruising: No   Major bleeding event: No  Thrombosis signs/symptoms: No  Thromboembolic event: No  Missed doses: No  Extra doses: No  Medication changes: No  Dietary changes: No  Change in health: No  Change in activity: No  Alcohol: No  Other concerns: No    Upcoming Surgeries:  Does the Patient Have any upcoming surgeries that require interruption in anticoagulation therapy? no  Does the patient require bridging? no      Anticoagulation Summary  As of 12/22/2023      INR goal:  2.5-3.5   TTR:  50.6 % (2.6 mo)   INR used for dosing:  3.10 (12/22/2023)   Weekly warfarin total:  22.5 mg               Assessment/Plan   Therapeutic     1. New dose:  Reduce weekly dose due to last INR > 8.      2. Next INR: 1 week      Education provided to patient during the visit:  Patient instructed to call in interim with questions, concerns and changes.   Patient educated on interactions between medications and warfarin.   Patient educated on dietary consistency in vitamin k consumption.   Patient educated on affects of alcohol consumption while taking warfarin.   Patient educated on signs of bleeding/clotting.   Patient educated on compliance with dosing, follow up appointments, and prescribed plan of care.

## 2023-12-26 ENCOUNTER — HOSPITAL ENCOUNTER (INPATIENT)
Facility: HOSPITAL | Age: 88
LOS: 2 days | Discharge: HOME | DRG: 308 | End: 2023-12-29
Attending: STUDENT IN AN ORGANIZED HEALTH CARE EDUCATION/TRAINING PROGRAM | Admitting: INTERNAL MEDICINE
Payer: MEDICARE

## 2023-12-26 ENCOUNTER — APPOINTMENT (OUTPATIENT)
Dept: CARDIOLOGY | Facility: HOSPITAL | Age: 88
DRG: 308 | End: 2023-12-26
Payer: MEDICARE

## 2023-12-26 ENCOUNTER — APPOINTMENT (OUTPATIENT)
Dept: RADIOLOGY | Facility: HOSPITAL | Age: 88
DRG: 308 | End: 2023-12-26
Payer: MEDICARE

## 2023-12-26 DIAGNOSIS — I48.91 ATRIAL FIBRILLATION AND FLUTTER (MULTI): ICD-10-CM

## 2023-12-26 DIAGNOSIS — I06.1 RHEUMATIC AORTIC VALVE INSUFFICIENCY: ICD-10-CM

## 2023-12-26 DIAGNOSIS — I50.20 SYSTOLIC CONGESTIVE HEART FAILURE, UNSPECIFIED HF CHRONICITY (MULTI): Primary | ICD-10-CM

## 2023-12-26 DIAGNOSIS — I50.23 ACUTE ON CHRONIC SYSTOLIC (CONGESTIVE) HEART FAILURE (MULTI): ICD-10-CM

## 2023-12-26 DIAGNOSIS — I48.92 ATRIAL FIBRILLATION AND FLUTTER (MULTI): ICD-10-CM

## 2023-12-26 LAB
ALBUMIN SERPL-MCNC: 4.3 G/DL (ref 3.5–5)
ALP BLD-CCNC: 93 U/L (ref 35–125)
ALT SERPL-CCNC: 56 U/L (ref 5–40)
ANION GAP SERPL CALC-SCNC: 14 MMOL/L
AORTIC VALVE MEAN GRADIENT: 5
AORTIC VALVE PEAK VELOCITY: 1.51
APTT PPP: 34.7 SECONDS (ref 22–32.5)
AST SERPL-CCNC: 50 U/L (ref 5–40)
AV PEAK GRADIENT: 9.1
AVA (PEAK VEL): 1.34
AVA (VTI): 1.24
BASOPHILS # BLD AUTO: 0.01 X10*3/UL (ref 0–0.1)
BASOPHILS NFR BLD AUTO: 0.1 %
BILIRUB SERPL-MCNC: 1.2 MG/DL (ref 0.1–1.2)
BUN SERPL-MCNC: 38 MG/DL (ref 8–25)
CALCIUM SERPL-MCNC: 9.2 MG/DL (ref 8.5–10.4)
CHLORIDE SERPL-SCNC: 104 MMOL/L (ref 97–107)
CO2 SERPL-SCNC: 21 MMOL/L (ref 24–31)
CREAT SERPL-MCNC: 1.5 MG/DL (ref 0.4–1.6)
EJECTION FRACTION APICAL 4 CHAMBER: 20.5
EJECTION FRACTION: 24
EOSINOPHIL # BLD AUTO: 0.05 X10*3/UL (ref 0–0.4)
EOSINOPHIL NFR BLD AUTO: 0.6 %
ERYTHROCYTE [DISTWIDTH] IN BLOOD BY AUTOMATED COUNT: 14.1 % (ref 11.5–14.5)
GFR SERPL CREATININE-BSD FRML MDRD: 45 ML/MIN/1.73M*2
GLUCOSE SERPL-MCNC: 83 MG/DL (ref 65–99)
HCT VFR BLD AUTO: 47.6 % (ref 41–52)
HGB BLD-MCNC: 15.5 G/DL (ref 13.5–17.5)
IMM GRANULOCYTES # BLD AUTO: 0.02 X10*3/UL (ref 0–0.5)
IMM GRANULOCYTES NFR BLD AUTO: 0.2 % (ref 0–0.9)
INR PPP: 5.4 (ref 0.9–1.2)
LEFT VENTRICLE INTERNAL DIMENSION DIASTOLE: 5.46 (ref 3.5–6)
LEFT VENTRICULAR OUTFLOW TRACT DIAMETER: 1.9
LYMPHOCYTES # BLD AUTO: 0.93 X10*3/UL (ref 0.8–3)
LYMPHOCYTES NFR BLD AUTO: 10.3 %
MAGNESIUM SERPL-MCNC: 2.4 MG/DL (ref 1.6–3.1)
MCH RBC QN AUTO: 30.6 PG (ref 26–34)
MCHC RBC AUTO-ENTMCNC: 32.6 G/DL (ref 32–36)
MCV RBC AUTO: 94 FL (ref 80–100)
MITRAL VALVE E/A RATIO: 3.91
MITRAL VALVE E/E' RATIO: 16.46
MONOCYTES # BLD AUTO: 0.79 X10*3/UL (ref 0.05–0.8)
MONOCYTES NFR BLD AUTO: 8.8 %
NEUTROPHILS # BLD AUTO: 7.22 X10*3/UL (ref 1.6–5.5)
NEUTROPHILS NFR BLD AUTO: 80 %
NRBC BLD-RTO: 0 /100 WBCS (ref 0–0)
NT-PROBNP SERPL-MCNC: 7693 PG/ML (ref 0–852)
PLATELET # BLD AUTO: 317 X10*3/UL (ref 150–450)
POTASSIUM SERPL-SCNC: 4.4 MMOL/L (ref 3.4–5.1)
PROT SERPL-MCNC: 8 G/DL (ref 5.9–7.9)
PROTHROMBIN TIME: 50.1 SECONDS (ref 9.3–12.7)
RBC # BLD AUTO: 5.06 X10*6/UL (ref 4.5–5.9)
RIGHT VENTRICLE FREE WALL PEAK S': 7.51
RIGHT VENTRICLE PEAK SYSTOLIC PRESSURE: 46.6
SODIUM SERPL-SCNC: 139 MMOL/L (ref 133–145)
TRICUSPID ANNULAR PLANE SYSTOLIC EXCURSION: 1
TROPONIN T SERPL-MCNC: 28 NG/L
TROPONIN T SERPL-MCNC: 29 NG/L
TROPONIN T SERPL-MCNC: 30 NG/L
TSH SERPL DL<=0.05 MIU/L-ACNC: 2.28 MIU/L (ref 0.27–4.2)
WBC # BLD AUTO: 9 X10*3/UL (ref 4.4–11.3)

## 2023-12-26 PROCEDURE — 83880 ASSAY OF NATRIURETIC PEPTIDE: CPT | Performed by: STUDENT IN AN ORGANIZED HEALTH CARE EDUCATION/TRAINING PROGRAM

## 2023-12-26 PROCEDURE — 93005 ELECTROCARDIOGRAM TRACING: CPT

## 2023-12-26 PROCEDURE — 99285 EMERGENCY DEPT VISIT HI MDM: CPT | Mod: 25 | Performed by: STUDENT IN AN ORGANIZED HEALTH CARE EDUCATION/TRAINING PROGRAM

## 2023-12-26 PROCEDURE — 80053 COMPREHEN METABOLIC PANEL: CPT | Performed by: STUDENT IN AN ORGANIZED HEALTH CARE EDUCATION/TRAINING PROGRAM

## 2023-12-26 PROCEDURE — 2500000004 HC RX 250 GENERAL PHARMACY W/ HCPCS (ALT 636 FOR OP/ED): Performed by: PHYSICIAN ASSISTANT

## 2023-12-26 PROCEDURE — 36415 COLL VENOUS BLD VENIPUNCTURE: CPT | Performed by: STUDENT IN AN ORGANIZED HEALTH CARE EDUCATION/TRAINING PROGRAM

## 2023-12-26 PROCEDURE — 84484 ASSAY OF TROPONIN QUANT: CPT | Performed by: STUDENT IN AN ORGANIZED HEALTH CARE EDUCATION/TRAINING PROGRAM

## 2023-12-26 PROCEDURE — 93306 TTE W/DOPPLER COMPLETE: CPT | Performed by: INTERNAL MEDICINE

## 2023-12-26 PROCEDURE — 93306 TTE W/DOPPLER COMPLETE: CPT

## 2023-12-26 PROCEDURE — 93010 ELECTROCARDIOGRAM REPORT: CPT | Performed by: INTERNAL MEDICINE

## 2023-12-26 PROCEDURE — 85730 THROMBOPLASTIN TIME PARTIAL: CPT | Performed by: STUDENT IN AN ORGANIZED HEALTH CARE EDUCATION/TRAINING PROGRAM

## 2023-12-26 PROCEDURE — 85025 COMPLETE CBC W/AUTO DIFF WBC: CPT | Performed by: STUDENT IN AN ORGANIZED HEALTH CARE EDUCATION/TRAINING PROGRAM

## 2023-12-26 PROCEDURE — G0378 HOSPITAL OBSERVATION PER HR: HCPCS

## 2023-12-26 PROCEDURE — 2500000001 HC RX 250 WO HCPCS SELF ADMINISTERED DRUGS (ALT 637 FOR MEDICARE OP): Performed by: INTERNAL MEDICINE

## 2023-12-26 PROCEDURE — 83735 ASSAY OF MAGNESIUM: CPT | Performed by: INTERNAL MEDICINE

## 2023-12-26 PROCEDURE — 84443 ASSAY THYROID STIM HORMONE: CPT | Performed by: INTERNAL MEDICINE

## 2023-12-26 PROCEDURE — 71275 CT ANGIOGRAPHY CHEST: CPT

## 2023-12-26 PROCEDURE — 2550000001 HC RX 255 CONTRASTS: Performed by: STUDENT IN AN ORGANIZED HEALTH CARE EDUCATION/TRAINING PROGRAM

## 2023-12-26 PROCEDURE — 85610 PROTHROMBIN TIME: CPT | Performed by: STUDENT IN AN ORGANIZED HEALTH CARE EDUCATION/TRAINING PROGRAM

## 2023-12-26 PROCEDURE — 2500000002 HC RX 250 W HCPCS SELF ADMINISTERED DRUGS (ALT 637 FOR MEDICARE OP, ALT 636 FOR OP/ED): Performed by: INTERNAL MEDICINE

## 2023-12-26 PROCEDURE — 2500000005 HC RX 250 GENERAL PHARMACY W/O HCPCS: Performed by: INTERNAL MEDICINE

## 2023-12-26 RX ORDER — LOSARTAN POTASSIUM 100 MG/1
100 TABLET ORAL DAILY
Status: DISCONTINUED | OUTPATIENT
Start: 2023-12-26 | End: 2023-12-29 | Stop reason: HOSPADM

## 2023-12-26 RX ORDER — ACETAMINOPHEN 325 MG/1
650 TABLET ORAL EVERY 4 HOURS PRN
Status: DISCONTINUED | OUTPATIENT
Start: 2023-12-26 | End: 2023-12-29 | Stop reason: HOSPADM

## 2023-12-26 RX ORDER — NAPROXEN SODIUM 220 MG/1
81 TABLET, FILM COATED ORAL DAILY
Status: DISCONTINUED | OUTPATIENT
Start: 2023-12-26 | End: 2023-12-29 | Stop reason: HOSPADM

## 2023-12-26 RX ORDER — COLESEVELAM 180 1/1
1250 TABLET ORAL
Status: DISCONTINUED | OUTPATIENT
Start: 2023-12-27 | End: 2023-12-29 | Stop reason: HOSPADM

## 2023-12-26 RX ORDER — DILTIAZEM HCL/D5W 125 MG/125
5-15 PLASTIC BAG, INJECTION (ML) INTRAVENOUS CONTINUOUS
Status: DISCONTINUED | OUTPATIENT
Start: 2023-12-26 | End: 2023-12-26

## 2023-12-26 RX ORDER — POLYETHYLENE GLYCOL 3350 17 G/17G
17 POWDER, FOR SOLUTION ORAL DAILY
Status: DISCONTINUED | OUTPATIENT
Start: 2023-12-26 | End: 2023-12-29 | Stop reason: HOSPADM

## 2023-12-26 RX ORDER — DILTIAZEM HYDROCHLORIDE 5 MG/ML
20 INJECTION INTRAVENOUS EVERY 2 HOUR PRN
Status: DISCONTINUED | OUTPATIENT
Start: 2023-12-26 | End: 2023-12-26

## 2023-12-26 RX ORDER — ACETAMINOPHEN 160 MG/5ML
650 SOLUTION ORAL EVERY 4 HOURS PRN
Status: DISCONTINUED | OUTPATIENT
Start: 2023-12-26 | End: 2023-12-29 | Stop reason: HOSPADM

## 2023-12-26 RX ORDER — PANTOPRAZOLE SODIUM 40 MG/10ML
40 INJECTION, POWDER, LYOPHILIZED, FOR SOLUTION INTRAVENOUS
Status: DISCONTINUED | OUTPATIENT
Start: 2023-12-27 | End: 2023-12-29 | Stop reason: HOSPADM

## 2023-12-26 RX ORDER — METOPROLOL SUCCINATE 50 MG/1
50 TABLET, EXTENDED RELEASE ORAL EVERY 12 HOURS
Status: DISCONTINUED | OUTPATIENT
Start: 2023-12-27 | End: 2023-12-27

## 2023-12-26 RX ORDER — TALC
3 POWDER (GRAM) TOPICAL DAILY
Status: DISCONTINUED | OUTPATIENT
Start: 2023-12-26 | End: 2023-12-29 | Stop reason: HOSPADM

## 2023-12-26 RX ORDER — AMIODARONE HYDROCHLORIDE 200 MG/1
400 TABLET ORAL 2 TIMES DAILY
Status: DISCONTINUED | OUTPATIENT
Start: 2023-12-26 | End: 2023-12-28

## 2023-12-26 RX ORDER — ACETAMINOPHEN 650 MG/1
650 SUPPOSITORY RECTAL EVERY 4 HOURS PRN
Status: DISCONTINUED | OUTPATIENT
Start: 2023-12-26 | End: 2023-12-29 | Stop reason: HOSPADM

## 2023-12-26 RX ORDER — PANTOPRAZOLE SODIUM 40 MG/1
40 TABLET, DELAYED RELEASE ORAL
Status: DISCONTINUED | OUTPATIENT
Start: 2023-12-27 | End: 2023-12-29 | Stop reason: HOSPADM

## 2023-12-26 RX ORDER — FUROSEMIDE 10 MG/ML
20 INJECTION INTRAMUSCULAR; INTRAVENOUS ONCE
Status: COMPLETED | OUTPATIENT
Start: 2023-12-26 | End: 2023-12-26

## 2023-12-26 RX ORDER — FUROSEMIDE 10 MG/ML
20 INJECTION INTRAMUSCULAR; INTRAVENOUS EVERY 12 HOURS
Status: DISCONTINUED | OUTPATIENT
Start: 2023-12-27 | End: 2023-12-27

## 2023-12-26 RX ORDER — METOPROLOL SUCCINATE 25 MG/1
100 TABLET, EXTENDED RELEASE ORAL EVERY 12 HOURS
Status: DISCONTINUED | OUTPATIENT
Start: 2023-12-26 | End: 2023-12-26

## 2023-12-26 RX ADMIN — AMIODARONE HYDROCHLORIDE 400 MG: 200 TABLET ORAL at 22:28

## 2023-12-26 RX ADMIN — DILTIAZEM HYDROCHLORIDE 20 MG: 5 INJECTION, SOLUTION INTRAVENOUS at 19:52

## 2023-12-26 RX ADMIN — Medication 3 MG: at 20:06

## 2023-12-26 RX ADMIN — ASPIRIN 81 MG: 81 TABLET, CHEWABLE ORAL at 15:38

## 2023-12-26 RX ADMIN — LOSARTAN POTASSIUM 100 MG: 100 TABLET, FILM COATED ORAL at 15:38

## 2023-12-26 RX ADMIN — FUROSEMIDE 20 MG: 10 INJECTION, SOLUTION INTRAMUSCULAR; INTRAVENOUS at 10:33

## 2023-12-26 RX ADMIN — IOHEXOL 75 ML: 350 INJECTION, SOLUTION INTRAVENOUS at 13:05

## 2023-12-26 RX ADMIN — DILTIAZEM HYDROCHLORIDE 20 MG: 5 INJECTION, SOLUTION INTRAVENOUS at 15:31

## 2023-12-26 SDOH — SOCIAL STABILITY: SOCIAL INSECURITY: DOES ANYONE TRY TO KEEP YOU FROM HAVING/CONTACTING OTHER FRIENDS OR DOING THINGS OUTSIDE YOUR HOME?: NO

## 2023-12-26 SDOH — SOCIAL STABILITY: SOCIAL INSECURITY: ABUSE: ADULT

## 2023-12-26 SDOH — SOCIAL STABILITY: SOCIAL INSECURITY: HAVE YOU HAD THOUGHTS OF HARMING ANYONE ELSE?: NO

## 2023-12-26 SDOH — SOCIAL STABILITY: SOCIAL INSECURITY: WERE YOU ABLE TO COMPLETE ALL THE BEHAVIORAL HEALTH SCREENINGS?: YES

## 2023-12-26 SDOH — SOCIAL STABILITY: SOCIAL INSECURITY: HAS ANYONE EVER THREATENED TO HURT YOUR FAMILY OR YOUR PETS?: NO

## 2023-12-26 SDOH — SOCIAL STABILITY: SOCIAL INSECURITY: ARE THERE ANY APPARENT SIGNS OF INJURIES/BEHAVIORS THAT COULD BE RELATED TO ABUSE/NEGLECT?: NO

## 2023-12-26 SDOH — SOCIAL STABILITY: SOCIAL INSECURITY: DO YOU FEEL UNSAFE GOING BACK TO THE PLACE WHERE YOU ARE LIVING?: NO

## 2023-12-26 SDOH — SOCIAL STABILITY: SOCIAL INSECURITY: ARE YOU OR HAVE YOU BEEN THREATENED OR ABUSED PHYSICALLY, EMOTIONALLY, OR SEXUALLY BY ANYONE?: NO

## 2023-12-26 SDOH — SOCIAL STABILITY: SOCIAL INSECURITY: DO YOU FEEL ANYONE HAS EXPLOITED OR TAKEN ADVANTAGE OF YOU FINANCIALLY OR OF YOUR PERSONAL PROPERTY?: NO

## 2023-12-26 ASSESSMENT — COGNITIVE AND FUNCTIONAL STATUS - GENERAL
PATIENT BASELINE BEDBOUND: NO
MOBILITY SCORE: 24
DAILY ACTIVITIY SCORE: 24

## 2023-12-26 ASSESSMENT — ACTIVITIES OF DAILY LIVING (ADL)
TOILETING: INDEPENDENT
JUDGMENT_ADEQUATE_SAFELY_COMPLETE_DAILY_ACTIVITIES: YES
GROOMING: INDEPENDENT
ADEQUATE_TO_COMPLETE_ADL: YES
FEEDING YOURSELF: INDEPENDENT
DRESSING YOURSELF: INDEPENDENT
BATHING: INDEPENDENT
HEARING - LEFT EAR: FUNCTIONAL
FEEDING YOURSELF: INDEPENDENT
PATIENT'S MEMORY ADEQUATE TO SAFELY COMPLETE DAILY ACTIVITIES?: YES
DRESSING YOURSELF: INDEPENDENT
TOILETING: INDEPENDENT
GROOMING: INDEPENDENT
HEARING - LEFT EAR: FUNCTIONAL
PATIENT'S MEMORY ADEQUATE TO SAFELY COMPLETE DAILY ACTIVITIES?: YES
JUDGMENT_ADEQUATE_SAFELY_COMPLETE_DAILY_ACTIVITIES: YES
ADEQUATE_TO_COMPLETE_ADL: YES
HEARING - RIGHT EAR: FUNCTIONAL
BATHING: INDEPENDENT
LACK_OF_TRANSPORTATION: NO
WALKS IN HOME: INDEPENDENT
HEARING - RIGHT EAR: FUNCTIONAL
WALKS IN HOME: INDEPENDENT

## 2023-12-26 ASSESSMENT — COLUMBIA-SUICIDE SEVERITY RATING SCALE - C-SSRS
1. IN THE PAST MONTH, HAVE YOU WISHED YOU WERE DEAD OR WISHED YOU COULD GO TO SLEEP AND NOT WAKE UP?: NO
1. IN THE PAST MONTH, HAVE YOU WISHED YOU WERE DEAD OR WISHED YOU COULD GO TO SLEEP AND NOT WAKE UP?: NO
6. HAVE YOU EVER DONE ANYTHING, STARTED TO DO ANYTHING, OR PREPARED TO DO ANYTHING TO END YOUR LIFE?: NO
2. HAVE YOU ACTUALLY HAD ANY THOUGHTS OF KILLING YOURSELF?: NO
2. HAVE YOU ACTUALLY HAD ANY THOUGHTS OF KILLING YOURSELF?: NO
6. HAVE YOU EVER DONE ANYTHING, STARTED TO DO ANYTHING, OR PREPARED TO DO ANYTHING TO END YOUR LIFE?: NO

## 2023-12-26 ASSESSMENT — ENCOUNTER SYMPTOMS
MUSCULOSKELETAL NEGATIVE: 1
PSYCHIATRIC NEGATIVE: 1
ALLERGIC/IMMUNOLOGIC NEGATIVE: 1
HEMATOLOGIC/LYMPHATIC NEGATIVE: 1
GASTROINTESTINAL NEGATIVE: 1
NEUROLOGICAL NEGATIVE: 1
PALPITATIONS: 1
RESPIRATORY NEGATIVE: 1
CONSTITUTIONAL NEGATIVE: 1
ENDOCRINE NEGATIVE: 1
EYES NEGATIVE: 1

## 2023-12-26 ASSESSMENT — PATIENT HEALTH QUESTIONNAIRE - PHQ9
SUM OF ALL RESPONSES TO PHQ9 QUESTIONS 1 & 2: 0
1. LITTLE INTEREST OR PLEASURE IN DOING THINGS: NOT AT ALL
2. FEELING DOWN, DEPRESSED OR HOPELESS: NOT AT ALL

## 2023-12-26 ASSESSMENT — LIFESTYLE VARIABLES
HOW OFTEN DO YOU HAVE A DRINK CONTAINING ALCOHOL: NEVER
AUDIT-C TOTAL SCORE: 0
SUBSTANCE_ABUSE_PAST_12_MONTHS: NO
AUDIT-C TOTAL SCORE: 0
HOW OFTEN DO YOU HAVE 6 OR MORE DRINKS ON ONE OCCASION: NEVER
HOW MANY STANDARD DRINKS CONTAINING ALCOHOL DO YOU HAVE ON A TYPICAL DAY: PATIENT DOES NOT DRINK
PRESCIPTION_ABUSE_PAST_12_MONTHS: NO
SKIP TO QUESTIONS 9-10: 1

## 2023-12-26 ASSESSMENT — PAIN - FUNCTIONAL ASSESSMENT
PAIN_FUNCTIONAL_ASSESSMENT: 0-10
PAIN_FUNCTIONAL_ASSESSMENT: 0-10

## 2023-12-26 ASSESSMENT — PAIN SCALES - GENERAL
PAINLEVEL_OUTOF10: 0 - NO PAIN
PAINLEVEL_OUTOF10: 0 - NO PAIN

## 2023-12-26 ASSESSMENT — PAIN SCALES - WONG BAKER: WONGBAKER_NUMERICALRESPONSE: NO HURT

## 2023-12-26 NOTE — H&P
History Of Present Illness  Jose Martin Fleming is a 88 y.o. male presenting with palpitations.  This patient chronic atrial fibrillation history of [facility]'s aortic valve comes to the hospital after 10 days of daily feeling of palpitations.  He coax primary care physician who increased his metoprolol from 50 once daily to 53 times a day which he has been taking but heart rates he has been staying in the 130 range.  At that point he was advised to come to the hospital for evaluation.  ER felt that he was in heart failure gave him some Lasix.  He actually denies swelling or shortness of breath or chest pain he denies any nausea vomiting abdominal pain fever chills difficulty with urination Magdalena analysis.     Past Medical History  He has a past medical history of Anemia (09/02/2023), Aortic stenosis (09/02/2023), Aortic valve disorder (09/02/2023), At risk for bleeding (09/02/2023), Atrial fibrillation (CMS/[facility]) (09/02/2023), Cerebrovascular accident (CVA) (CMS/[facility]) (09/02/2023), History of aortic valve replacement (09/02/2023), Hypercholesterolemia (09/02/2023), Hypertension (09/02/2023), Long term (current) use of anticoagulants (09/25/2023), Personal history of other diseases of the circulatory system (07/18/2014), Personal history of other diseases of the nervous system and sense organs, Prediabetes (09/02/2023), and Preglaucoma, unspecified, unspecified eye.  Reviewed  Surgical History  He has a past surgical history that includes Appendectomy (10/08/2013); IR embolization (09/09/2021); Mechanical aortic valve replacement (1985); Total hip arthroplasty (Left, 1999); Ankle fracture surgery (1989); Total hip arthroplasty (Right, 2005); and Colonoscopy (2010).  Reviewed  Social History  He reports that he has quit smoking. His smoking use included cigarettes. He has a 53.00 pack-year smoking history. He has never used smokeless tobacco. He reports that he does not currently use alcohol. He reports that he does not  use drugs.  Reviewed  Family History  Family History   Problem Relation Name Age of Onset    Heart attack Father      Sudden death Father          Cardiac    Diabetes Brother          Allergies  Rosuvastatin    ROS  Review of Systems   Constitutional: Negative.    HENT: Negative.     Eyes: Negative.    Respiratory: Negative.     Cardiovascular:  Positive for palpitations.   Gastrointestinal: Negative.    Endocrine: Negative.    Genitourinary: Negative.    Musculoskeletal: Negative.    Skin: Negative.    Allergic/Immunologic: Negative.    Neurological: Negative.    Hematological: Negative.    Psychiatric/Behavioral: Negative.     All other systems reviewed and are negative.       Last Recorded Vitals  /82 (BP Location: Left arm, Patient Position: Sitting)   Pulse (!) 126   Resp 19   Wt 75.8 kg (167 lb 1.7 oz)   SpO2 96%     Physical Exam  I saw this patient emergency room bed #6 in the presence of his wife.  This is  male who appears younger than his stated age he is sitting up on the ER cart room air no distress  He is normocephalic atraumatic EOMI PERRLA  Neck no JVD  Chest clear bilaterally  Heart irregularly irregular heart rate about 130/min  Abdomen soft nontender  Extremities no peripheral edema  Neurologic examination gross motor sensor nonfocal  Skin no rash  Psych no psychosis    Relevant Results  Reviewed EKG, unclear if this is sinus tach versus a flutter 2:1 conduction    ASSESSMENT/PLAN  Assessment/Plan   Principal Problem:    Atrial fibrillation and flutter (CMS/HCC)    He will be admitted for heart rate control I will continue his metoprolol 50 mg twice a day will check TSH , repeat EKG and consult cardiology as far as his heart rhythm .I do not feel that he is in significant heart failure overload clinically although CAT scan shows significant bilateral pleural effusions we will continue with IV Lasix low-dose.  I will order repeat echo and will consult cardiology Dr. Rehman.  INR  is supratherapeutic we will continue for mechanical valve warfarin but will repeat hold warfarin today repeat INR tomorrow       Dustin Brunson MD

## 2023-12-26 NOTE — ED PROVIDER NOTES
HPI   Chief Complaint   Patient presents with    Rapid Heart Rate     For the past 10 days I have had a heart rate of 130 and I have been weak sob lightheaded and sob       HPI  88-year-old male here for elevated heart rate.  He says that for the last 10 days his heart rate has been elevated as high as 130 bpm.  He has had some associated dyspnea and dyspnea on exertion that is worse than his baseline.  He does not have any chest pain, he has a history of previous heart valve replacement and does have a history of irregular heart rhythm however he tells me that his cardiologist never felt to be significant enough to require medication, he states that he has a little bit of lower extremity edema which is somewhat new for him, he follows with Dr. Rehman for cardiology and Dr. Garcia for family                  Becky Coma Scale Score: 15                  Patient History   Past Medical History:   Diagnosis Date    Anemia 09/02/2023    Aortic stenosis 09/02/2023    Aortic valve disorder 09/02/2023    At risk for bleeding 09/02/2023    Atrial fibrillation (CMS/[facility]) 09/02/2023    Cerebrovascular accident (CVA) (CMS/[facility]) 09/02/2023    History of aortic valve replacement 09/02/2023    Hypercholesterolemia 09/02/2023    Hypertension 09/02/2023    Long term (current) use of anticoagulants 09/25/2023    Personal history of other diseases of the circulatory system 07/18/2014    History of aortic valve disorder    Personal history of other diseases of the nervous system and sense organs     History of cataract    Prediabetes 09/02/2023    Preglaucoma, unspecified, unspecified eye     Glaucoma suspect     Past Surgical History:   Procedure Laterality Date    ANKLE FRACTURE SURGERY  1989    APPENDECTOMY  10/08/2013    Appendectomy    COLONOSCOPY  2010    IR EMBOLIZATION  09/09/2021    IR EMBOLIZATION [facility]    MECHANICAL AORTIC VALVE REPLACEMENT  1985    [facility]    TOTAL HIP ARTHROPLASTY Left 1999    TOTAL HIP  ARTHROPLASTY Right 2005     Family History   Problem Relation Name Age of Onset    Heart attack Father      Sudden death Father          Cardiac    Diabetes Brother       Social History     Tobacco Use    Smoking status: Former     Packs/day: 1.00     Years: 53.00     Additional pack years: 0.00     Total pack years: 53.00     Types: Cigarettes    Smokeless tobacco: Never   Substance Use Topics    Alcohol use: Not Currently    Drug use: Never       Physical Exam   ED Triage Vitals [12/26/23 0928]   Temp Heart Rate Resp BP   -- (!) 125 20 113/89      SpO2 Temp src Heart Rate Source Patient Position   95 % -- Monitor Sitting      BP Location FiO2 (%)     Left arm --       Physical Exam  PHYSICAL EXAMINATION    GENERAL APPEARANCE: Awake and alert.     VITAL SIGNS: As per the nurses' triage record.     HEENT: Normocephalic, atraumatic. Extraocular muscles are intact. Pupils equal round and reactive to light. Conjunctiva are pink. Negative scleral icterus. Mucous membranes are moist. Tongue in the midline. Pharynx was without erythema or exudates, uvula midline    NECK: Soft Nontender and supple, full gross ROM, no meningeal signs.    CHEST: Nontender to palpation. Clear to auscultation bilaterally. No rales, rhonchi, or wheezing.     HEART: S1, S2. Regular rate and rhythm. No murmurs, gallops or rubs.  Strong and equal pulses in the extremities.     ABDOMEN: Soft, nontender, nondistended, positive bowel sounds, no palpable masses.    MUSCULOSKELETAL: Lower extremity edema equal and symmetric bilaterally 1 no more than 2+.  Pedal pulses intact.  The calves are nontender to palpation. Full gross active range of motion. Ambulating on own with no acute difficulties     NEUROLOGICAL: Awake, alert and oriented x 3. Power intact in the upper and lower extremities. Sensation is intact to light touch in the upper and lower extremities.     IMMUNOLOGICAL: No lymphatic streaking noted     DERM: No petechiae, rashes, or  ecchymoses.  ED Course & MDM   ED Course as of 12/26/23 1357   Tue Dec 26, 2023   1234 Patient was a difficult IV access, IV nurse specialist in the hospital came down to assist on initial, however after CT has been ordered they need a larger bore IV for which we are awaiting IV nurse to attempt [AP]      ED Course User Index  [AP] Jericho Choi, PA-CATE         Diagnoses as of 12/26/23 1357   Systolic congestive heart failure, unspecified HF chronicity (CMS/HCC)       Medical Decision Making  Patient has been seen in conjunction with attending physician Dr. Castillo    Parts of this chart have been completed using voice recognition software. Please excuse any errors of transcription.  My thought process and reason for plan has been formulated from the time that I saw the patient until the time of disposition and is not specific to one specific moment during their visit and furthermore my MDM encompasses this entire chart and not only this text box.      HPI: Detailed above.    Exam: A medically appropriate exam performed, outlined above, given the known history and presentation.    History obtained from: Patient    EKG: Obtained, read by attending physician reviewed myself, per my interpretation no sign of STEMI criteria    Social Determinants of Health considered during this visit: Lives at home    Medications given during visit:  Medications   furosemide (Lasix) injection 20 mg (20 mg intravenous Given 12/26/23 1033)   iohexol (OMNIPaque) 350 mg iodine/mL solution 75 mL (75 mL intravenous Given 12/26/23 1305)        Diagnostic/tests  Labs Reviewed   CBC WITH AUTO DIFFERENTIAL - Abnormal       Result Value    WBC 9.0      nRBC 0.0      RBC 5.06      Hemoglobin 15.5      Hematocrit 47.6      MCV 94      MCH 30.6      MCHC 32.6      RDW 14.1      Platelets 317      Neutrophils % 80.0      Immature Granulocytes %, Automated 0.2      Lymphocytes % 10.3      Monocytes % 8.8      Eosinophils % 0.6      Basophils % 0.1       Neutrophils Absolute 7.22 (*)     Immature Granulocytes Absolute, Automated 0.02      Lymphocytes Absolute 0.93      Monocytes Absolute 0.79      Eosinophils Absolute 0.05      Basophils Absolute 0.01     COMPREHENSIVE METABOLIC PANEL - Abnormal    Glucose 83      Sodium 139      Potassium 4.4      Chloride 104      Bicarbonate 21 (*)     Urea Nitrogen 38 (*)     Creatinine 1.50      eGFR 45 (*)     Calcium 9.2      Albumin 4.3      Alkaline Phosphatase 93      Total Protein 8.0 (*)     AST 50 (*)     Bilirubin, Total 1.2      ALT 56 (*)     Anion Gap 14     N-TERMINAL PROBNP - Abnormal    PROBNP 7,693 (*)     Narrative:     Reference ranges are based on clinical submission data. These ranges represent the 95th percentile of normal cut-off points. As NT Pro- BNP values approach 1000 pg/ml, clinical symptoms are more likely associated with CHF.   SERIAL TROPONIN, INITIAL ([facility]) - Abnormal    Troponin T, High Sensitivity 30 (*)    SERIAL TROPONIN,  2 HOUR ([facility]) - Abnormal    Troponin T, High Sensitivity 28 (*)    APTT - Abnormal    aPTT 34.7 (*)    PROTIME-INR - Abnormal    Protime 50.1 (*)     INR 5.4 (*)     Narrative:     INR Therapeutic Range: 2.0-3.5   TROPONIN T SERIES, HIGH SENSITIVITY (0, 2 HR, 6 HR)    Narrative:     The following orders were created for panel order Troponin T Series, High Sensitivity (0, 2HR, 6HR).  Procedure                               Abnormality         Status                     ---------                               -----------         ------                     Serial Troponin, Initial...[[id number]]  Abnormal            Final result               Serial Troponin, 2 Hour ...[[id number]]  Abnormal            Final result               Serial Troponin, 6 Hour ...[[id number]]                                                   Please view results for these tests on the individual orders.   SERIAL TROPONIN, 6 HOUR ([facility])   SERIAL TROPONIN, 6 HOUR ([facility])      CT angio chest for  pulmonary embolism   Final Result   1. No evidence for pulmonary embolism.   2. Large bilateral effusions with ground-glass infiltrates raising   the possibility of congestive heart failure.   3. Emphysematous change pulmonary parenchyma.   4. Cholelithiasis.   5. Left renal cyst.   6. Aneurysmal dilatation of the ascending thoracic aorta.   7. Prosthetic aortic valve.        MACRO:   none        Signed by: Saurabh Hanna 12/26/2023 1:29 PM   Dictation workstation:   [id number]           Considerations/further MDM:  I spoke with Dr. castanon. We thoroughly discussed the history, physical exam, laboratory and imaging studies, as well as, emergency department course. Based upon that discussion, we've decided to admit for further observation and evaluation of their symptoms.  As I have deemed necessary from their history, physical, and studies, I have considered and evaluated for the following diagnoses: Acute coronary syndrome including MI, intracranial hemorrhage, malignant dysrhythmia, hypertension, considered pericardial tamponade, pneumothorax, hemothorax, blunt force injury or trauma, considered pulmonary embolism sepsis I also considered stroke pneumonia or respiratory distress or respiratory compromise, pericardial effusion and dissection    Patient is in no acute distress, currently resting comfortably.  The patient will be hospitalized for CHF evaluation.      Procedure  Procedures     Jericho Choi PA-C  12/26/23 5848

## 2023-12-26 NOTE — ED PROVIDER NOTES
Supervisory note:  Patient seen in conjunction with BIRDIE Carter.  Patient presents with shortness of breath, weakness.  He states that starting about 10 days ago, he noticed that his heart rate was consistently in the 120s.  He has had progressive worsening of shortness of breath.  Whenever he is active, he becomes very breathless.  No pain in the chest.  He denies swelling in the legs.  On examination, there is trace pretibial edema of bilateral lower extremities.  Heart is rapid and irregular to auscultation.  Lungs are clear.    EKG interpreted by me: Sinus tachycardia with PVC, rate 125.  Normal axis.  Wide QRS complexes with appropriate ST and T wave changes in setting of wide QRS complexes.  Compared with previous EKG, QRS complexes are wide as well but morphology has changed.    Patient noted to have significant tachycardia.  In setting of shortness of breath with exertion, lower extremity edema, and elevated BNP, patient felt to be in acute heart failure.  As etiology of his tachycardia is uncertain, CTA chest was obtained to evaluate for PE.  This was negative however.  Patient treated with Lasix for heart failure exacerbation.  Patient accepted to hospitalist service for further management.    I personally saw the patient and made/approved the management plan and take responsiblity for the patient management.  Parts of this chart were completed with dictation software, please excuse any errors in transcription.     Paolo Castillo MD  12/26/23 8173

## 2023-12-27 ENCOUNTER — PATIENT OUTREACH (OUTPATIENT)
Dept: CASE MANAGEMENT | Facility: HOSPITAL | Age: 88
End: 2023-12-27

## 2023-12-27 ENCOUNTER — APPOINTMENT (OUTPATIENT)
Dept: CARDIOLOGY | Facility: HOSPITAL | Age: 88
DRG: 308 | End: 2023-12-27
Payer: MEDICARE

## 2023-12-27 PROBLEM — I50.20 SYSTOLIC CONGESTIVE HEART FAILURE, UNSPECIFIED HF CHRONICITY (MULTI): Status: ACTIVE | Noted: 2023-12-27

## 2023-12-27 LAB
ALBUMIN SERPL-MCNC: 3.4 G/DL (ref 3.5–5)
ALP BLD-CCNC: 74 U/L (ref 35–125)
ALT SERPL-CCNC: 44 U/L (ref 5–40)
ANION GAP SERPL CALC-SCNC: 11 MMOL/L
AST SERPL-CCNC: 37 U/L (ref 5–40)
BASOPHILS # BLD AUTO: 0.02 X10*3/UL (ref 0–0.1)
BASOPHILS NFR BLD AUTO: 0.2 %
BILIRUB SERPL-MCNC: 0.8 MG/DL (ref 0.1–1.2)
BUN SERPL-MCNC: 42 MG/DL (ref 8–25)
CALCIUM SERPL-MCNC: 8.5 MG/DL (ref 8.5–10.4)
CHLORIDE SERPL-SCNC: 108 MMOL/L (ref 97–107)
CO2 SERPL-SCNC: 22 MMOL/L (ref 24–31)
CREAT SERPL-MCNC: 1.4 MG/DL (ref 0.4–1.6)
EOSINOPHIL # BLD AUTO: 0.2 X10*3/UL (ref 0–0.4)
EOSINOPHIL NFR BLD AUTO: 2.3 %
ERYTHROCYTE [DISTWIDTH] IN BLOOD BY AUTOMATED COUNT: 14 % (ref 11.5–14.5)
GFR SERPL CREATININE-BSD FRML MDRD: 48 ML/MIN/1.73M*2
GLUCOSE SERPL-MCNC: 71 MG/DL (ref 65–99)
HCT VFR BLD AUTO: 42 % (ref 41–52)
HGB BLD-MCNC: 13.6 G/DL (ref 13.5–17.5)
IMM GRANULOCYTES # BLD AUTO: 0.01 X10*3/UL (ref 0–0.5)
IMM GRANULOCYTES NFR BLD AUTO: 0.1 % (ref 0–0.9)
INR PPP: 6.1 (ref 0.9–1.2)
LYMPHOCYTES # BLD AUTO: 1.06 X10*3/UL (ref 0.8–3)
LYMPHOCYTES NFR BLD AUTO: 12.3 %
MCH RBC QN AUTO: 30.4 PG (ref 26–34)
MCHC RBC AUTO-ENTMCNC: 32.4 G/DL (ref 32–36)
MCV RBC AUTO: 94 FL (ref 80–100)
MONOCYTES # BLD AUTO: 0.99 X10*3/UL (ref 0.05–0.8)
MONOCYTES NFR BLD AUTO: 11.5 %
NEUTROPHILS # BLD AUTO: 6.31 X10*3/UL (ref 1.6–5.5)
NEUTROPHILS NFR BLD AUTO: 73.6 %
NRBC BLD-RTO: 0 /100 WBCS (ref 0–0)
PLATELET # BLD AUTO: 275 X10*3/UL (ref 150–450)
POTASSIUM SERPL-SCNC: 4.2 MMOL/L (ref 3.4–5.1)
PROT SERPL-MCNC: 6.2 G/DL (ref 5.9–7.9)
PROTHROMBIN TIME: 56 SECONDS (ref 9.3–12.7)
RBC # BLD AUTO: 4.48 X10*6/UL (ref 4.5–5.9)
SODIUM SERPL-SCNC: 141 MMOL/L (ref 133–145)
WBC # BLD AUTO: 8.6 X10*3/UL (ref 4.4–11.3)

## 2023-12-27 PROCEDURE — 2500000001 HC RX 250 WO HCPCS SELF ADMINISTERED DRUGS (ALT 637 FOR MEDICARE OP): Performed by: INTERNAL MEDICINE

## 2023-12-27 PROCEDURE — 1200000002 HC GENERAL ROOM WITH TELEMETRY DAILY

## 2023-12-27 PROCEDURE — 80053 COMPREHEN METABOLIC PANEL: CPT | Performed by: INTERNAL MEDICINE

## 2023-12-27 PROCEDURE — 36415 COLL VENOUS BLD VENIPUNCTURE: CPT | Performed by: INTERNAL MEDICINE

## 2023-12-27 PROCEDURE — 85610 PROTHROMBIN TIME: CPT | Performed by: INTERNAL MEDICINE

## 2023-12-27 PROCEDURE — 2500000002 HC RX 250 W HCPCS SELF ADMINISTERED DRUGS (ALT 637 FOR MEDICARE OP, ALT 636 FOR OP/ED): Performed by: INTERNAL MEDICINE

## 2023-12-27 PROCEDURE — 93005 ELECTROCARDIOGRAM TRACING: CPT

## 2023-12-27 PROCEDURE — 85025 COMPLETE CBC W/AUTO DIFF WBC: CPT | Performed by: INTERNAL MEDICINE

## 2023-12-27 PROCEDURE — 99222 1ST HOSP IP/OBS MODERATE 55: CPT | Performed by: INTERNAL MEDICINE

## 2023-12-27 PROCEDURE — 2500000004 HC RX 250 GENERAL PHARMACY W/ HCPCS (ALT 636 FOR OP/ED): Performed by: INTERNAL MEDICINE

## 2023-12-27 RX ORDER — FUROSEMIDE 40 MG/1
40 TABLET ORAL DAILY
Status: DISCONTINUED | OUTPATIENT
Start: 2023-12-27 | End: 2023-12-29 | Stop reason: HOSPADM

## 2023-12-27 RX ADMIN — AMIODARONE HYDROCHLORIDE 400 MG: 200 TABLET ORAL at 10:38

## 2023-12-27 RX ADMIN — COLESEVELAM HYDROCHLORIDE 1250 MG: 625 TABLET, COATED ORAL at 10:38

## 2023-12-27 RX ADMIN — METOPROLOL SUCCINATE 75 MG: 25 TABLET, EXTENDED RELEASE ORAL at 15:42

## 2023-12-27 RX ADMIN — METOPROLOL SUCCINATE 50 MG: 50 TABLET, EXTENDED RELEASE ORAL at 02:50

## 2023-12-27 RX ADMIN — ASPIRIN 81 MG: 81 TABLET, CHEWABLE ORAL at 10:38

## 2023-12-27 RX ADMIN — Medication 3 MG: at 21:01

## 2023-12-27 RX ADMIN — PANTOPRAZOLE SODIUM 40 MG: 40 TABLET, DELAYED RELEASE ORAL at 07:00

## 2023-12-27 RX ADMIN — AMIODARONE HYDROCHLORIDE 400 MG: 200 TABLET ORAL at 21:00

## 2023-12-27 RX ADMIN — LOSARTAN POTASSIUM 100 MG: 100 TABLET, FILM COATED ORAL at 10:38

## 2023-12-27 RX ADMIN — FUROSEMIDE 40 MG: 40 TABLET ORAL at 10:38

## 2023-12-27 RX ADMIN — POLYETHYLENE GLYCOL 3350 17 G: 17 POWDER, FOR SOLUTION ORAL at 10:38

## 2023-12-27 SDOH — ECONOMIC STABILITY: HOUSING INSECURITY
IN THE LAST 12 MONTHS, WAS THERE A TIME WHEN YOU DID NOT HAVE A STEADY PLACE TO SLEEP OR SLEPT IN A SHELTER (INCLUDING NOW)?: NO

## 2023-12-27 SDOH — ECONOMIC STABILITY: TRANSPORTATION INSECURITY
IN THE PAST 12 MONTHS, HAS LACK OF TRANSPORTATION KEPT YOU FROM MEETINGS, WORK, OR FROM GETTING THINGS NEEDED FOR DAILY LIVING?: NO

## 2023-12-27 SDOH — ECONOMIC STABILITY: INCOME INSECURITY: IN THE LAST 12 MONTHS, WAS THERE A TIME WHEN YOU WERE NOT ABLE TO PAY THE MORTGAGE OR RENT ON TIME?: NO

## 2023-12-27 SDOH — SOCIAL STABILITY: SOCIAL NETWORK
DO YOU BELONG TO ANY CLUBS OR ORGANIZATIONS SUCH AS CHURCH GROUPS UNIONS, FRATERNAL OR ATHLETIC GROUPS, OR SCHOOL GROUPS?: NO

## 2023-12-27 SDOH — ECONOMIC STABILITY: FOOD INSECURITY: WITHIN THE PAST 12 MONTHS, YOU WORRIED THAT YOUR FOOD WOULD RUN OUT BEFORE YOU GOT MONEY TO BUY MORE.: NEVER TRUE

## 2023-12-27 SDOH — HEALTH STABILITY: MENTAL HEALTH
STRESS IS WHEN SOMEONE FEELS TENSE, NERVOUS, ANXIOUS, OR CAN'T SLEEP AT NIGHT BECAUSE THEIR MIND IS TROUBLED. HOW STRESSED ARE YOU?: NOT AT ALL

## 2023-12-27 SDOH — SOCIAL STABILITY: SOCIAL INSECURITY
WITHIN THE LAST YEAR, HAVE YOU BEEN KICKED, HIT, SLAPPED, OR OTHERWISE PHYSICALLY HURT BY YOUR PARTNER OR EX-PARTNER?: NO

## 2023-12-27 SDOH — ECONOMIC STABILITY: FOOD INSECURITY: WITHIN THE PAST 12 MONTHS, THE FOOD YOU BOUGHT JUST DIDN'T LAST AND YOU DIDN'T HAVE MONEY TO GET MORE.: NEVER TRUE

## 2023-12-27 SDOH — ECONOMIC STABILITY: INCOME INSECURITY: HOW HARD IS IT FOR YOU TO PAY FOR THE VERY BASICS LIKE FOOD, HOUSING, MEDICAL CARE, AND HEATING?: NOT HARD AT ALL

## 2023-12-27 SDOH — ECONOMIC STABILITY: INCOME INSECURITY: IN THE PAST 12 MONTHS, HAS THE ELECTRIC, GAS, OIL, OR WATER COMPANY THREATENED TO SHUT OFF SERVICE IN YOUR HOME?: NO

## 2023-12-27 SDOH — SOCIAL STABILITY: SOCIAL INSECURITY: WITHIN THE LAST YEAR, HAVE YOU BEEN HUMILIATED OR EMOTIONALLY ABUSED IN OTHER WAYS BY YOUR PARTNER OR EX-PARTNER?: NO

## 2023-12-27 SDOH — HEALTH STABILITY: MENTAL HEALTH: HOW OFTEN DO YOU HAVE 6 OR MORE DRINKS ON ONE OCCASION?: NEVER

## 2023-12-27 SDOH — HEALTH STABILITY: MENTAL HEALTH: HOW OFTEN DO YOU HAVE A DRINK CONTAINING ALCOHOL?: NEVER

## 2023-12-27 SDOH — SOCIAL STABILITY: SOCIAL NETWORK
IN A TYPICAL WEEK, HOW MANY TIMES DO YOU TALK ON THE PHONE WITH FAMILY, FRIENDS, OR NEIGHBORS?: MORE THAN THREE TIMES A WEEK

## 2023-12-27 SDOH — SOCIAL STABILITY: SOCIAL INSECURITY
WITHIN THE LAST YEAR, HAVE TO BEEN RAPED OR FORCED TO HAVE ANY KIND OF SEXUAL ACTIVITY BY YOUR PARTNER OR EX-PARTNER?: NO

## 2023-12-27 SDOH — HEALTH STABILITY: PHYSICAL HEALTH: ON AVERAGE, HOW MANY MINUTES DO YOU ENGAGE IN EXERCISE AT THIS LEVEL?: 30 MIN

## 2023-12-27 SDOH — HEALTH STABILITY: PHYSICAL HEALTH: ON AVERAGE, HOW MANY DAYS PER WEEK DO YOU ENGAGE IN MODERATE TO STRENUOUS EXERCISE (LIKE A BRISK WALK)?: 7 DAYS

## 2023-12-27 SDOH — HEALTH STABILITY: MENTAL HEALTH: HOW MANY STANDARD DRINKS CONTAINING ALCOHOL DO YOU HAVE ON A TYPICAL DAY?: PATIENT DOES NOT DRINK

## 2023-12-27 SDOH — SOCIAL STABILITY: SOCIAL INSECURITY: WITHIN THE LAST YEAR, HAVE YOU BEEN AFRAID OF YOUR PARTNER OR EX-PARTNER?: NO

## 2023-12-27 SDOH — SOCIAL STABILITY: SOCIAL NETWORK: ARE YOU MARRIED, WIDOWED, DIVORCED, SEPARATED, NEVER MARRIED, OR LIVING WITH A PARTNER?: MARRIED

## 2023-12-27 SDOH — ECONOMIC STABILITY: TRANSPORTATION INSECURITY
IN THE PAST 12 MONTHS, HAS THE LACK OF TRANSPORTATION KEPT YOU FROM MEDICAL APPOINTMENTS OR FROM GETTING MEDICATIONS?: NO

## 2023-12-27 SDOH — SOCIAL STABILITY: SOCIAL NETWORK: HOW OFTEN DO YOU ATTEND CHURCH OR RELIGIOUS SERVICES?: NEVER

## 2023-12-27 SDOH — SOCIAL STABILITY: SOCIAL NETWORK: HOW OFTEN DO YOU GET TOGETHER WITH FRIENDS OR RELATIVES?: MORE THAN THREE TIMES A WEEK

## 2023-12-27 SDOH — SOCIAL STABILITY: SOCIAL NETWORK: HOW OFTEN DO YOU ATTENT MEETINGS OF THE CLUB OR ORGANIZATION YOU BELONG TO?: NEVER

## 2023-12-27 ASSESSMENT — ACTIVITIES OF DAILY LIVING (ADL): LACK_OF_TRANSPORTATION: NO

## 2023-12-27 ASSESSMENT — LIFESTYLE VARIABLES
AUDIT-C TOTAL SCORE: 0
SKIP TO QUESTIONS 9-10: 1

## 2023-12-27 ASSESSMENT — PAIN SCALES - GENERAL
PAINLEVEL_OUTOF10: 0 - NO PAIN
PAINLEVEL_OUTOF10: 0 - NO PAIN

## 2023-12-27 ASSESSMENT — PAIN SCALES - WONG BAKER: WONGBAKER_NUMERICALRESPONSE: NO HURT

## 2023-12-27 NOTE — PROGRESS NOTES
Jose Martin Fleming is a 88 y.o. male on day 0 of admission presenting with Atrial fibrillation and flutter (CMS/HCC).      Subjective   He is feeling well no palpitations requiring some oxygen.  Discussed with cardiology Dr. Rehman will be starting amio        Objective     Last Recorded Vitals  BP (!) 125/91 (BP Location: Right arm, Patient Position: Lying)   Pulse (!) 122   Temp 36.1 °C (97 °F) (Temporal)   Resp 23   Wt 73.7 kg (162 lb 7.7 oz)   SpO2 96%   Intake/Output last 3 Shifts:    Intake/Output Summary (Last 24 hours) at 12/27/2023 0966  Last data filed at 12/27/2023 0357  Gross per 24 hour   Intake 250 ml   Output --   Net 250 ml       Physical Exam  Doing well no distress  Chest somewhat diminished at the bases  Heart regular rate and 125/min  Abdomen soft nontender  Extremities no edema  Neurologic exam focal  Relevant Results  Labs reviewed including INR  Assessment/Plan     Principal Problem:    Atrial fibrillation and flutter (CMS/HCC)      Systolic congestive heart failure noted  Coagulopathy noted  Continue to monitor, await cardiology management             Dustin Brunson MD

## 2023-12-27 NOTE — PROGRESS NOTES
Physical Therapy                 Therapy Communication Note    Patient Name: Jose Martin Fleming  MRN: [id number]  Today's Date: 12/27/2023     Discipline: Physical Therapy    Missed Visit Reason: Missed Visit Reason: Cancel    Missed Time: Cancel    Comment: Afib with -140 at rest.  Plan for cardioversion tomorrow 12/28. INR 6.1 today. Cancel PT eval for today
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
## 2023-12-27 NOTE — PROGRESS NOTES
Jose Martin Fleming is a 88 y.o. male presenting with palpitations shortness of breath lightheadedness and generalized weakness..     Patient seen at bedside, introduced myself and reason for visit. Patient lives with spouse in a one story condo. Independent with ADLs  and stays quite active walking on a daily basis as well as riding his bike. Patient denies any recurrent, lightheadedness, dizziness, presyncope or syncope. He states he has been feeling well. He continues to stay active on a regular basis. He has a supportive family, still drives and has no outside services. He reports that he has quit smoking. His smoking use included cigarettes. He has a 53.00 pack-year smoking history. He has never used smokeless tobacco. He reports that he does not currently use alcohol. He reports that he does not use drugs.   He is planning to go to [facility] for a period of time over the winter. He will remain on his current therapy and return for routine visit in 6 months.   He will continue to follow up in the coumadin clinic. He does have levels checked in [place]. He did try home testing for a brief period of time but preferred the Coumadin clinic. Patient recently detected to have apparent atrial fibrillation with rapid ventricular rate versus an atypical atrial flutter versus ectopic atrial tachycardia.  He was seen by his primary care physician on 12/8/2023 noted to have a heart rate in the 120s per minute?  His dose of Toprol-XL was increased from 50 mg daily to 100 mg daily and then 150 mg daily without any slowing in the ventricular rate still in the 125/min range up to 130.  The patient has developed increasing shortness of breath during this period of time. Amiodarone was added yesterday but suspect the patient will require electrical DC cardioversion which will be scheduled for tomorrow.   Patient will be discharged with no needs, he will be going to [place] in January 2024 for the winter.     PLAN: Discharge to  home with no anticipated needs.     Stacie Brennan RN

## 2023-12-27 NOTE — PROGRESS NOTES
Occupational Therapy                 Therapy Communication Note    Patient Name: Jose Martin Fleming  MRN: [id number]  Today's Date: 12/27/2023     Discipline: Occupational Therapy    Missed Visit Reason: Missed Visit Reason: Cancel    Missed Time: Cancel    Comment:  Pt w/ Afib w/ -140 at rest. Plan for cardioversion tomorrow. INR 6.1. Cancel OT for today.

## 2023-12-27 NOTE — PROGRESS NOTES
Met with pt @ bedside for information exchange & CHF education. Discussed HF Nurse Navigator role/HF program. Pt is agreeable to receiving CHF education & being followed post hospital discharge for CHF management. Pt reports that he lives @ home with spouse, he is very active & drives. Follows up regularly with PCP, Dr BHUPENDRA Gonzalez & Cardiologist, Dr Rehman.  Reports his HR has been 110-130 since beginning of Dec. He has followed up with his PCP, which increased his Toprol dose about 2 weeks ago & he was also seen via Cardiology. No improvement in HR.  Admitted with Acute CHF & A-FIB rvr. 2D echo completed this hospital stay showed rEF 20-25%. Cardioversion scheduled tomorrow. Pt informed me of broken tooth, which has been bothering him. Informed Dr Brunson of broken tooth.  Provided CHF booklet/folder & reviewed. Educated on CHF,EF,HF flare up symptoms to report to MD & managing CHF.  HEART FAILURE EDUCATION:  1. Weigh yourself daily and record on your weight log.  2. If you gain more than 2 or 3 pounds overnight, call your cardiologist.  3. Follow a low sodium diet. No more than 2000 mg in one day, or more than 650 mg per meal.  4. Limit total fluids to no more than 8 cups (or 2 liters) per day - this includes all fluids (water, coffee, juice, milk, tea, etc.)  5. Monitor your blood pressure,HR daily and record on your weight log.  6. Call to schedule your follow-up appointments when you get home if they were not already scheduled for you.  7. Keep your follow-up appointments! Bring your weight log with you so the doctors can see your weight trend and blood pressure readings.  8. Be sure to  any new prescriptions and take them as directed. If unsure of the medications, be sure to call your cardiologist.  9. Stay as active as you can tolerate.   10. If you notice subtle change of symptoms (slight increase in swelling, slight shortness of breath, a new intolerance to laying flat, a new cough), be sure to  call your cardiologist.  Pt verbalized understanding of CHF education. He has follow up appt scheduled with Dr Rehman (1/9/24). I will continue to follow while IP & after discharge for CHF management.

## 2023-12-27 NOTE — PROGRESS NOTES
Spiritual Care Visit    Clinical Encounter Type  Visited With: Patient  Routine Visit: Follow-up  Continue Visiting: Yes         Values/Beliefs  Spiritual Requests During Hospitalization: Anointed today    Sacramental Encounters  Sacrament of Sick-Anointing: Anointed     Matty Nichols

## 2023-12-28 ENCOUNTER — ANESTHESIA EVENT (OUTPATIENT)
Dept: OPERATING ROOM | Facility: HOSPITAL | Age: 88
DRG: 308 | End: 2023-12-28
Payer: MEDICARE

## 2023-12-28 ENCOUNTER — ANESTHESIA (OUTPATIENT)
Dept: OPERATING ROOM | Facility: HOSPITAL | Age: 88
DRG: 308 | End: 2023-12-28
Payer: MEDICARE

## 2023-12-28 ENCOUNTER — APPOINTMENT (OUTPATIENT)
Dept: CARDIOLOGY | Facility: HOSPITAL | Age: 88
DRG: 308 | End: 2023-12-28
Payer: MEDICARE

## 2023-12-28 LAB
ALBUMIN SERPL-MCNC: 3.2 G/DL (ref 3.5–5)
ALP BLD-CCNC: 71 U/L (ref 35–125)
ALT SERPL-CCNC: 37 U/L (ref 5–40)
ANION GAP SERPL CALC-SCNC: 12 MMOL/L
AST SERPL-CCNC: 32 U/L (ref 5–40)
ATRIAL RATE: 123 BPM
BASOPHILS # BLD AUTO: 0.02 X10*3/UL (ref 0–0.1)
BASOPHILS NFR BLD AUTO: 0.2 %
BILIRUB SERPL-MCNC: 0.6 MG/DL (ref 0.1–1.2)
BUN SERPL-MCNC: 43 MG/DL (ref 8–25)
CALCIUM SERPL-MCNC: 8.3 MG/DL (ref 8.5–10.4)
CHLORIDE SERPL-SCNC: 109 MMOL/L (ref 97–107)
CO2 SERPL-SCNC: 20 MMOL/L (ref 24–31)
CREAT SERPL-MCNC: 1.5 MG/DL (ref 0.4–1.6)
EOSINOPHIL # BLD AUTO: 0.18 X10*3/UL (ref 0–0.4)
EOSINOPHIL NFR BLD AUTO: 2.2 %
ERYTHROCYTE [DISTWIDTH] IN BLOOD BY AUTOMATED COUNT: 13.6 % (ref 11.5–14.5)
GFR SERPL CREATININE-BSD FRML MDRD: 45 ML/MIN/1.73M*2
GLUCOSE SERPL-MCNC: 97 MG/DL (ref 65–99)
HCT VFR BLD AUTO: 40.6 % (ref 41–52)
HGB BLD-MCNC: 13.1 G/DL (ref 13.5–17.5)
IMM GRANULOCYTES # BLD AUTO: 0.02 X10*3/UL (ref 0–0.5)
IMM GRANULOCYTES NFR BLD AUTO: 0.2 % (ref 0–0.9)
INR PPP: 3.7 (ref 0.9–1.2)
LYMPHOCYTES # BLD AUTO: 0.95 X10*3/UL (ref 0.8–3)
LYMPHOCYTES NFR BLD AUTO: 11.5 %
MCH RBC QN AUTO: 30.3 PG (ref 26–34)
MCHC RBC AUTO-ENTMCNC: 32.3 G/DL (ref 32–36)
MCV RBC AUTO: 94 FL (ref 80–100)
MONOCYTES # BLD AUTO: 0.91 X10*3/UL (ref 0.05–0.8)
MONOCYTES NFR BLD AUTO: 11.1 %
NEUTROPHILS # BLD AUTO: 6.15 X10*3/UL (ref 1.6–5.5)
NEUTROPHILS NFR BLD AUTO: 74.8 %
NRBC BLD-RTO: 0 /100 WBCS (ref 0–0)
PLATELET # BLD AUTO: 255 X10*3/UL (ref 150–450)
POTASSIUM SERPL-SCNC: 4.3 MMOL/L (ref 3.4–5.1)
PR INTERVAL: 160 MS
PROT SERPL-MCNC: 5.9 G/DL (ref 5.9–7.9)
PROTHROMBIN TIME: 35 SECONDS (ref 9.3–12.7)
Q ONSET: 221 MS
QRS COUNT: 20 BEATS
QRS DURATION: 118 MS
QT INTERVAL: 356 MS
QTC CALCULATION(BAZETT): 509 MS
QTC FREDERICIA: 452 MS
R AXIS: -31 DEGREES
RBC # BLD AUTO: 4.32 X10*6/UL (ref 4.5–5.9)
SODIUM SERPL-SCNC: 141 MMOL/L (ref 133–145)
T AXIS: 185 DEGREES
T OFFSET: 399 MS
VENTRICULAR RATE: 123 BPM
WBC # BLD AUTO: 8.2 X10*3/UL (ref 4.4–11.3)

## 2023-12-28 PROCEDURE — 2500000002 HC RX 250 W HCPCS SELF ADMINISTERED DRUGS (ALT 637 FOR MEDICARE OP, ALT 636 FOR OP/ED): Performed by: INTERNAL MEDICINE

## 2023-12-28 PROCEDURE — 7100000002 HC RECOVERY ROOM TIME - EACH INCREMENTAL 1 MINUTE: Performed by: INTERNAL MEDICINE

## 2023-12-28 PROCEDURE — 99100 ANES PT EXTEME AGE<1 YR&>70: CPT | Performed by: ANESTHESIOLOGY

## 2023-12-28 PROCEDURE — 2500000005 HC RX 250 GENERAL PHARMACY W/O HCPCS: Performed by: NURSE ANESTHETIST, CERTIFIED REGISTERED

## 2023-12-28 PROCEDURE — 2500000004 HC RX 250 GENERAL PHARMACY W/ HCPCS (ALT 636 FOR OP/ED): Performed by: NURSE ANESTHETIST, CERTIFIED REGISTERED

## 2023-12-28 PROCEDURE — 99232 SBSQ HOSP IP/OBS MODERATE 35: CPT | Performed by: INTERNAL MEDICINE

## 2023-12-28 PROCEDURE — 97165 OT EVAL LOW COMPLEX 30 MIN: CPT | Mod: GO

## 2023-12-28 PROCEDURE — 7100000001 HC RECOVERY ROOM TIME - INITIAL BASE CHARGE: Performed by: INTERNAL MEDICINE

## 2023-12-28 PROCEDURE — 93005 ELECTROCARDIOGRAM TRACING: CPT

## 2023-12-28 PROCEDURE — 5A2204Z RESTORATION OF CARDIAC RHYTHM, SINGLE: ICD-10-PCS | Performed by: INTERNAL MEDICINE

## 2023-12-28 PROCEDURE — 85610 PROTHROMBIN TIME: CPT | Performed by: INTERNAL MEDICINE

## 2023-12-28 PROCEDURE — 80053 COMPREHEN METABOLIC PANEL: CPT | Performed by: INTERNAL MEDICINE

## 2023-12-28 PROCEDURE — 1200000002 HC GENERAL ROOM WITH TELEMETRY DAILY

## 2023-12-28 PROCEDURE — 36415 COLL VENOUS BLD VENIPUNCTURE: CPT | Performed by: INTERNAL MEDICINE

## 2023-12-28 PROCEDURE — A92960 PR CARDIOVERSION, ELECTIVE;EXTERN: Performed by: NURSE ANESTHETIST, CERTIFIED REGISTERED

## 2023-12-28 PROCEDURE — 85025 COMPLETE CBC W/AUTO DIFF WBC: CPT | Performed by: INTERNAL MEDICINE

## 2023-12-28 PROCEDURE — 2500000001 HC RX 250 WO HCPCS SELF ADMINISTERED DRUGS (ALT 637 FOR MEDICARE OP): Performed by: INTERNAL MEDICINE

## 2023-12-28 PROCEDURE — 97161 PT EVAL LOW COMPLEX 20 MIN: CPT | Mod: GP

## 2023-12-28 PROCEDURE — 2500000004 HC RX 250 GENERAL PHARMACY W/ HCPCS (ALT 636 FOR OP/ED): Performed by: INTERNAL MEDICINE

## 2023-12-28 PROCEDURE — A92960 PR CARDIOVERSION, ELECTIVE;EXTERN: Performed by: ANESTHESIOLOGY

## 2023-12-28 PROCEDURE — 3700000001 HC GENERAL ANESTHESIA TIME - INITIAL BASE CHARGE: Performed by: INTERNAL MEDICINE

## 2023-12-28 PROCEDURE — 3700000002 HC GENERAL ANESTHESIA TIME - EACH INCREMENTAL 1 MINUTE: Performed by: INTERNAL MEDICINE

## 2023-12-28 RX ORDER — ALBUTEROL SULFATE 0.83 MG/ML
2.5 SOLUTION RESPIRATORY (INHALATION) ONCE
Status: DISCONTINUED | OUTPATIENT
Start: 2023-12-28 | End: 2023-12-28 | Stop reason: HOSPADM

## 2023-12-28 RX ORDER — AMIODARONE HYDROCHLORIDE 200 MG/1
200 TABLET ORAL 2 TIMES DAILY
Status: DISCONTINUED | OUTPATIENT
Start: 2023-12-28 | End: 2023-12-29

## 2023-12-28 RX ORDER — FENTANYL CITRATE 50 UG/ML
50 INJECTION, SOLUTION INTRAMUSCULAR; INTRAVENOUS EVERY 5 MIN PRN
Status: DISCONTINUED | OUTPATIENT
Start: 2023-12-28 | End: 2023-12-28 | Stop reason: HOSPADM

## 2023-12-28 RX ORDER — MIDAZOLAM HYDROCHLORIDE 1 MG/ML
1 INJECTION, SOLUTION INTRAMUSCULAR; INTRAVENOUS ONCE AS NEEDED
Status: DISCONTINUED | OUTPATIENT
Start: 2023-12-28 | End: 2023-12-28 | Stop reason: HOSPADM

## 2023-12-28 RX ORDER — ETOMIDATE 2 MG/ML
INJECTION INTRAVENOUS AS NEEDED
Status: DISCONTINUED | OUTPATIENT
Start: 2023-12-28 | End: 2023-12-28

## 2023-12-28 RX ORDER — SODIUM CHLORIDE, SODIUM LACTATE, POTASSIUM CHLORIDE, CALCIUM CHLORIDE 600; 310; 30; 20 MG/100ML; MG/100ML; MG/100ML; MG/100ML
100 INJECTION, SOLUTION INTRAVENOUS CONTINUOUS
Status: DISCONTINUED | OUTPATIENT
Start: 2023-12-28 | End: 2023-12-28 | Stop reason: HOSPADM

## 2023-12-28 RX ORDER — ONDANSETRON HYDROCHLORIDE 2 MG/ML
4 INJECTION, SOLUTION INTRAVENOUS ONCE AS NEEDED
Status: DISCONTINUED | OUTPATIENT
Start: 2023-12-28 | End: 2023-12-28 | Stop reason: HOSPADM

## 2023-12-28 RX ORDER — MEPERIDINE HYDROCHLORIDE 25 MG/ML
12.5 INJECTION INTRAMUSCULAR; INTRAVENOUS; SUBCUTANEOUS EVERY 10 MIN PRN
Status: DISCONTINUED | OUTPATIENT
Start: 2023-12-28 | End: 2023-12-28 | Stop reason: HOSPADM

## 2023-12-28 RX ORDER — LIDOCAINE HYDROCHLORIDE 10 MG/ML
0.1 INJECTION INFILTRATION; PERINEURAL ONCE
Status: DISCONTINUED | OUTPATIENT
Start: 2023-12-28 | End: 2023-12-28 | Stop reason: HOSPADM

## 2023-12-28 RX ORDER — METOPROLOL SUCCINATE 50 MG/1
50 TABLET, EXTENDED RELEASE ORAL EVERY 12 HOURS
Status: DISCONTINUED | OUTPATIENT
Start: 2023-12-28 | End: 2023-12-29

## 2023-12-28 RX ADMIN — AMIODARONE HYDROCHLORIDE 400 MG: 200 TABLET ORAL at 08:50

## 2023-12-28 RX ADMIN — METOPROLOL SUCCINATE 75 MG: 25 TABLET, EXTENDED RELEASE ORAL at 02:46

## 2023-12-28 RX ADMIN — COLESEVELAM HYDROCHLORIDE 1250 MG: 625 TABLET, COATED ORAL at 13:29

## 2023-12-28 RX ADMIN — LOSARTAN POTASSIUM 100 MG: 100 TABLET, FILM COATED ORAL at 08:50

## 2023-12-28 RX ADMIN — FUROSEMIDE 40 MG: 40 TABLET ORAL at 08:51

## 2023-12-28 RX ADMIN — PANTOPRAZOLE SODIUM 40 MG: 40 TABLET, DELAYED RELEASE ORAL at 06:22

## 2023-12-28 RX ADMIN — ASPIRIN 81 MG: 81 TABLET, CHEWABLE ORAL at 08:51

## 2023-12-28 RX ADMIN — SODIUM CHLORIDE, SODIUM LACTATE, POTASSIUM CHLORIDE, AND CALCIUM CHLORIDE: 600; 310; 30; 20 INJECTION, SOLUTION INTRAVENOUS at 11:23

## 2023-12-28 RX ADMIN — ETOMIDATE 10 MG: 2 INJECTION, SOLUTION INTRAVENOUS at 11:50

## 2023-12-28 RX ADMIN — Medication 3 MG: at 20:17

## 2023-12-28 RX ADMIN — AMIODARONE HYDROCHLORIDE 200 MG: 200 TABLET ORAL at 20:18

## 2023-12-28 ASSESSMENT — COGNITIVE AND FUNCTIONAL STATUS - GENERAL
MOBILITY SCORE: 24
DAILY ACTIVITIY SCORE: 24

## 2023-12-28 ASSESSMENT — PAIN SCALES - GENERAL
PAINLEVEL_OUTOF10: 0 - NO PAIN

## 2023-12-28 ASSESSMENT — ACTIVITIES OF DAILY LIVING (ADL)
ADL_ASSISTANCE: INDEPENDENT
BATHING_ASSISTANCE: INDEPENDENT
ADL_ASSISTANCE: INDEPENDENT

## 2023-12-28 ASSESSMENT — PAIN - FUNCTIONAL ASSESSMENT
PAIN_FUNCTIONAL_ASSESSMENT: 0-10

## 2023-12-28 NOTE — PROGRESS NOTES
Occupational Therapy    Evaluation    Patient Name: Jose Martin Fleming  MRN: [id number]  Today's Date: 12/28/2023  Time Calculation  Start Time: 0921  Stop Time: 0928  Time Calculation (min): 7 min    Assessment  IP OT Assessment  OT Assessment:  (Pt is 89 y/o male admitted for afib and flutter. Pt  is indep. w/ ADL's/ functional mobility. No acute OT needs. DSischarge OT services.)  End of Session Communication: Bedside nurse  End of Session Patient Position: Bed, 3 rail up  Plan:  No Skilled OT: No acute OT goals identified  OT Discharge Recommendations: No further acute OT  OT - OK to Discharge: Yes    Subjective   Current Problem:  1. Systolic congestive heart failure, unspecified HF chronicity (CMS/HCC)  Transthoracic Echo (TTE) Complete    Transthoracic Echo (TTE) Complete      2. Rheumatic aortic valve insufficiency  Transthoracic Echo (TTE) Complete    Transthoracic Echo (TTE) Complete      3. Acute on chronic systolic (congestive) heart failure (CMS/HCC)  Transthoracic Echo (TTE) Complete        General:  General  Reason for Referral: decreased ADL's  Referred By: Dr. Casarez  Past Medical History Relevant to Rehab: anemia, aortic stenosis, C VA, afib, HTN  Missed Visit: Yes  Missed Visit Reason: Cancel  Prior to Session Communication: Bedside nurse  Patient Position Received: Bed, 3 rail up  Preferred Learning Style: verbal  General Comment: cleared to see, pt agreeable to therapy.  Precautions:     Vital Signs:  Heart Rate: (!) 113  Heart Rate Source: Monitor  Resp: 20  Pain:  Pain Assessment  Pain Assessment: 0-10  Pain Score: 0 - No pain    Objective   Cognition:  Overall Cognitive Status: Within Functional Limits           Home Living:  Type of Home: Condo  Lives With: Spouse  Bathroom Shower/Tub: Tub/shower unit   Prior Function:  Level of [place]: Independent with ADLs and functional transfers, Independent with homemaking with ambulation  ADL Assistance: Independent  Homemaking Assistance:  Independent  Ambulatory Assistance: Independent  Vocational: Retired  Hand Dominance: Right  IADL History:  Homemaking Responsibilities:  (Pt is independent w/ homemaking)  Current License: Yes  Mode of Transportation: Car  ADL:  Eating Assistance: Independent  Grooming Assistance: Independent  Bathing Assistance: Independent  UE Dressing Assistance: Independent  LE Dressing Assistance: Independent  Toileting Assistance with Device: Not performed  Functional Assistance: Not performed  Activity Tolerance:  Endurance: Endurance does not limit participation in activity  Bed Mobility/Transfers: Bed Mobility  Bed Mobility: Yes  Bed Mobility 1  Bed Mobility 1: Supine to sitting  Level of Assistance 1: Independent  Bed Mobility 2  Bed Mobility  2: Sitting to supine  Level of Assistance 2: Independent    Transfers  Transfer: Yes  Transfer 1  Transfer From 1: Bed to  Transfer to 1: Stand  Technique 1: Sit to stand  Transfer Device 1:  (no AD)  Transfer Level of Assistance 1: Independent  Trials/Comments 1: Pt ambulated from bed to window to chair then to bed w/ no AD independently.  Transfers 2  Transfer From 2: Stand to, Sit to  Transfer to 2: Sit, Chair with arms, Stand  Technique 2: Stand to sit, Sit to stand  Transfer Device 2:  (no AD)  Transfer Level of Assistance 2: Independent  Modalities:     IADL's:   Homemaking Responsibilities:  (Pt is independent w/ homemaking)  Current License: Yes  Mode of Transportation: Car  Vision: Vision - Basic Assessment  Current Vision: No visual deficits  Sensation:  Light Touch: No apparent deficits  Strength:  Strength Comments: 4/5 (BUE)  Perception:     Coordination:  Movements are Fluid and Coordinated: Yes   Hand Function:  Hand Function  Gross Grasp: Functional  Coordination: Functional  Extremities: RUE   RUE : Within Functional Limits and LUE   LUE: Within Functional Limits      Outcome Measures: [facility] Daily Activity  Putting on and taking off regular lower body clothing:  None  Bathing (including washing, rinsing, drying): None  Putting on and taking off regular upper body clothing: None  Toileting, which includes using toilet, bedpan or urinal: None  Taking care of personal grooming such as brushing teeth: None  Eating Meals: None  Daily Activity - Total Score: 24      Education Documentation  No documentation found.  Education Comments  No comments found.      Goals:

## 2023-12-28 NOTE — PROGRESS NOTES
Jose Martin Fleming is a 88 y.o. male on day 1 of admission presenting with Atrial fibrillation and flutter (CMS/HCC).      Subjective   He is feeling well awaiting planned cardioversion for 11 AM       Objective     Last Recorded Vitals  /69 (BP Location: Right arm, Patient Position: Lying)   Pulse 98   Temp 36.2 °C (97.2 °F) (Temporal)   Resp 19   Wt 73.7 kg (162 lb 7.7 oz)   SpO2 98%   Intake/Output last 3 Shifts:  No intake or output data in the 24 hours ending 12/28/23 0746    Physical Exam  Resting comfortably  Alert oriented x 3 cooperative no distress  Chest diminished both bases  Heart regular rate is down to about 100  Abdomen soft nontender  Extremities no edema  Relevant Results  Reviewed  Assessment/Plan     Principal Problem:    Atrial fibrillation and flutter (CMS/HCC)  Active Problems:    Systolic congestive heart failure, unspecified HF chronicity (CMS/HCC)      Cardiology has plans for cardioversion.  Check INR no obvious bleeding           Dustin Brunson MD

## 2023-12-28 NOTE — PROGRESS NOTES
Patient is from home with spouse.  They are going to [place] 1/2024.  Therapy was discontinued as patient is independent with mobility.  Patient will return home with no needs.  Spoke with Bambi perez [facility] [phone].  Updates provided.  RN TCC following.    Stacie Frias RN

## 2023-12-28 NOTE — POST-PROCEDURE NOTE
Physician Transition of Care Summary  Invasive Cardiovascular Lab    Procedure Date: 12/28/2023  Attending:    Coby Rehman - Primary  Resident/Fellow/Other Assistant: Surgeon(s) and Role:    Indications:   * No Diagnosis Codes entered *    Post-procedure diagnosis:   * No Diagnosis Codes entered *    Procedure(s):   Cardioversion  73125 - AR CARDIOVERSION ELECTIVE ARRHYTHMIA EXTERNAL        Procedure Findings:   Atrial flutter    Description of the Procedure:   Electrical DC cardioversion, the patient was sedated by anesthesia.  Synchronized electrical DC cardioversion was performed using a biphasic defibrillator at 100 J with restoration of sinus rhythm with PACs.    Complications:   No complications    Stents/Implants:       Anticoagulation/Antiplatelet Plan:   Continue prior Coumadin therapy    Estimated Blood Loss:   * No values recorded between 12/28/2023 11:21 AM and 12/28/2023 11:53 AM *    Anesthesia: Consult Anesthesia Staff: Anesthesiologist: Mitchell Gross DO  CRNA: ROSALIO Cortez; ROSALIO Healy    Any Specimen(s) Removed:   No specimens collected during this procedure.    Disposition:   Stepdown unit      Electronically signed by: Anirudh Rehman MD, 12/28/2023 11:53 AM

## 2023-12-28 NOTE — ANESTHESIA POSTPROCEDURE EVALUATION
Patient: Jose Martin Fleming    Procedure Summary       Date: 12/28/23 Room / Location: [facility] / Virtual [facility] OR    Anesthesia Start: 1123 Anesthesia Stop: 1208    Procedure: Cardioversion Diagnosis:     Surgeons: Anirudh Rehman MD Responsible Provider: Mitchell Gross DO    Anesthesia Type: MAC ASA Status: 3            Anesthesia Type: MAC    Vitals Value Taken Time   /65 12/28/23 1215   Temp  12/28/23 1219   Pulse 58 12/28/23 1218   Resp 21 12/28/23 1218   SpO2 100 % 12/28/23 1218   Vitals shown include unvalidated device data.    Anesthesia Post Evaluation    Patient location during evaluation: bedside  Patient participation: complete - patient participated  Level of consciousness: awake  Pain management: adequate  Airway patency: patent  Cardiovascular status: acceptable  Respiratory status: acceptable  Hydration status: acceptable  Postoperative Nausea and Vomiting: none        There were no known notable events for this encounter.

## 2023-12-28 NOTE — CARE PLAN
The patient's goals for the shift include      The clinical goals for the shift include Increase activity    Over the shift, the patient did not make progress toward the following goals. Barriers to progression include monitorng post cardioversion. Recommendations to address these barriers include .

## 2023-12-28 NOTE — CARE PLAN
Problem: Pain  Goal: My pain/discomfort is manageable  Outcome: Progressing   The patient's goals for the shift include      The clinical goals for the shift include maintain stable HR    Over the shift, the patient did not make progress toward the following goals. Barriers to progression include . Recommendations to address these barriers include .

## 2023-12-28 NOTE — PROGRESS NOTES
Spiritual Care Visit    Clinical Encounter Type  Visited With: Patient  Routine Visit: Follow-up  Continue Visiting: Yes         Values/Beliefs  Spiritual Requests During Hospitalization: Edon today     Matty Nichols

## 2023-12-28 NOTE — PROGRESS NOTES
Subjective Data:  No new complaints    Overnight Events:    No significant new overnight events     Objective Data:  Last Recorded Vitals:  Vitals:    12/27/23 2304 12/28/23 0246 12/28/23 0329 12/28/23 0700   BP: 121/80 111/71 111/77 109/69   BP Location: Right arm  Right arm Right arm   Patient Position: Lying  Lying Lying   Pulse: (!) 116 (!) 113 (!) 113 98   Resp: 25 12 19   Temp: 35.9 °C (96.6 °F)  35.8 °C (96.4 °F) 36.2 °C (97.2 °F)   TempSrc: Temporal  Temporal Temporal   SpO2: 100%  97% 98%   Weight:       Height:           Last Labs:  CBC - 12/28/2023:  6:08 AM  8.2 13.1 255    40.6      CMP - 12/28/2023:  6:08 AM  8.3 5.9 32 --- 0.6   _ 3.2 37 71      PTT - 12/26/2023: 10:15 AM  3.7   35.0 34.7     HGBA1C   Date/Time Value Ref Range Status   11/20/2023 08:24 AM 5.3 See below % Final   12/01/2022 08:39 AM 5.6 4.0 - 6.0 % Final     Comment:     Hemoglobin A1C levels are related to mean blood glucose during the   preceding 2-3 months. The relationship table below may be used as a   general guide. Each 1% increase in HGB A1C is a reflection of an   increase in mean glucose of approximately 30 mg/dl.   Reference: Diabetes Care, volume 29, supplement 1 Jan. 2006                        HGB A1C ................. Approx. Mean Glucose   _______________________________________________   6%   ...............................  120 mg/dl   7%   ...............................  150 mg/dl   8%   ...............................  180 mg/dl   9%   ...............................  210 mg/dl   10%  ...............................  240 mg/dl  Performed at [address] [address]     11/30/2021 09:01 AM 5.5 4.0 - 6.0 % Final     Comment:     Hemoglobin A1C levels are related to mean blood glucose during the   preceding 2-3 months. The relationship table below may be used as a   general guide. Each 1% increase in HGB A1C is a reflection of an   increase in mean glucose of approximately 30 mg/dl.   Reference:  Diabetes Care, volume 29, supplement 1 Jan. 2006                        HGB A1C ................. Approx. Mean Glucose   _______________________________________________   6%   ...............................  120 mg/dl   7%   ...............................  150 mg/dl   8%   ...............................  180 mg/dl   9%   ...............................  210 mg/dl   10%  ...............................  240 mg/dl  Performed at [address] [address]     03/17/2021 09:23 AM 5.4 <6.0 % Final     Comment:     Hemoglobin A1c Interpretive Ranges:  Results between 6.0 to 6.5 are considered 'prediabetic'.  Results above 6.5% are diabetes 'cut point' based on criteria  from the Diabetes Care and International Expert Committee  Report (Diabetes Care, 2009). Therapeutic action is suggested  at levels above 6.5% HbA1c.     LDLCALC   Date/Time Value Ref Range Status   11/20/2023 08:24  65 - 130 mg/dL Final   12/01/2022 08:39  65 - 130 MG/DL Final   11/30/2021 09:01  65 - 130 MG/DL Final   11/05/2020 09:41  65 - 130 MG/DL Final      Last I/O:  I/O last 3 completed shifts:  In: 250 (3.4 mL/kg) [P.O.:250]  Out: - (0 mL/kg)   Weight: 73.7 kg     Past Cardiology Tests (Last 3 Years):  EKG:  ECG 12 Lead 12/27/2023      ECG 12 lead (Clinic Performed)     Echo:  Transthoracic Echo (TTE) Complete 12/26/2023    Ejection Fractions:  EF   Date/Time Value Ref Range Status   12/26/2023 03:50 PM 24       Cath:  No results found for this or any previous visit from the past 1095 days.    Stress Test:  No results found for this or any previous visit from the past 1095 days.    Cardiac Imaging:  No results found for this or any previous visit from the past 1095 days.      Inpatient Medications:  Scheduled medications   Medication Dose Route Frequency    amiodarone  400 mg oral BID    aspirin  81 mg oral Daily    colesevelam  1,250 mg oral Daily with lunch    furosemide  40 mg oral Daily     losartan  100 mg oral Daily    melatonin  3 mg oral Daily    metoprolol succinate XL  75 mg oral q12h    pantoprazole  40 mg oral Daily before breakfast    Or    pantoprazole  40 mg intravenous Daily before breakfast    perflutren lipid microspheres  0.5-10 mL of dilution intravenous Once in imaging    perflutren protein A microsphere  0.5 mL intravenous Once in imaging    polyethylene glycol  17 g oral Daily    sulfur hexafluoride microsphr  2 mL intravenous Once in imaging     PRN medications   Medication    acetaminophen    Or    acetaminophen    Or    acetaminophen     Continuous Medications   Medication Dose Last Rate       Physical Exam:  The patient is a well-appearing nonobese upper elderly white male sitting upright in bed previously ambulatory.  He is not short of breath even on room air.  JVP not elevated carotid and pulses 2+  Chest fair air movement breath sounds are diminished at the lung bases posteriorly  Cardiac rhythm is regular tachycardic occasional premature beats S1 and S2 her prosthetic minimal systolic murmur  Abdomen is soft and benign  No peripheral edema pedal pulses are present.     Assessment/Plan   12/27: The patient is a 88-year-old white male whose past history includes in part a [employer]'s mechanical bileaflet aortic valve replacement for severe aortic valvular stenosis and mild aortic valve insufficiency secondary to congenital bicuspid aortic valve on 10/19/1988.  The patient has been on long-term Coumadin anticoagulation.  He also has a history of hypertension hyperlipidemia former smoking and chronic systolic CHF with echocardiogram in 4/2022 estimating the LV ejection fraction at 35% with mild to moderate mitral valve regurgitation and mild to moderate pulmonary hypertension.  Patient recently detected to have apparent atrial fibrillation with rapid ventricular rate versus an atypical atrial flutter versus ectopic atrial tachycardia.  He was seen by his primary care physician  on 12/8/2023 noted to have a heart rate in the 120s per minute?  Atrial fibrillation.  His dose of Toprol-XL was increased from 50 mg daily to 100 mg daily and then 150 mg daily without any slowing in the ventricular rate still in the 125/min range up to 130.  The patient has developed increasing shortness of breath during this period of time.  EKG tracings appear to show a regular narrow complex QRS tachycardia with incomplete left bundle branch block conduction delay.  The regularity of the impersistence of the rhythm would suggest either at an ectopic atrial tachycardia or atypical atrial flutter which is not responding to escalating doses of metoprolol.  Amiodarone was added yesterday but suspect the patient will require electrical DC cardioversion which will be scheduled for tomorrow.  The patient has been on long-term Coumadin anticoagulation INR supratherapeutic yesterday.  His echocardiogram shows a further reduction in the LV ejection fraction now at 20-25%.  Suspect that the patient's persistent tachycardia resulted in acute on chronic systolic CHF with a component of tachycardia mediated cardiomyopathy superimposed on the previously known cardiomyopathy.  The patient will be started on low-dose Lasix 40 mg daily.  He is already on losartan 100 mg daily.  Will continue Coumadin as per INR.     12/28: The patient underwent electrical DC cardioversion in the PACU using a biphasic defibrillator at 100 J with restoration of sinus rhythm sinus bradycardia with atrial ectopic activity.  The dose of amiodarone will be reduced to 200 mg twice daily and will also decrease Toprol-XL 50 mg twice daily.  His INR was 3.7 at the time of the procedure.  The patient will need observation for another 1 to 3 days to continue with treatment of his acute on chronic systolic CHF.  Patient will remain on Lasix 40 mg daily at time of discharge.  Labs from earlier today include creatinine 1.5 potassium of 4.3. CBC essentially  unremarkable other than hemoglobin 13.1 hematocrit 40.6.  Peripheral IV 12/26/23 22 G Distal;Right;Upper Arm (Active)   Site Assessment Clean;Dry;Intact 12/28/23 0800   Dressing Status Dry;Clean 12/28/23 0800   Number of days: 2       Peripheral IV 12/26/23 20 G Distal;Left;Upper;Anterior Arm (Active)   Site Assessment Dry;Intact;Clean 12/28/23 0800   Dressing Status Clean;Dry 12/28/23 0800   Number of days: 2       Code Status:  Full Code    I spent 20 minutes in the professional and overall care of this patient.        Anirudh Rehman MD

## 2023-12-28 NOTE — ANESTHESIA PREPROCEDURE EVALUATION
Patient: Jose Martin Fleming    Procedure Information       Date/Time: 12/28/23 1130    Procedure: Cardioversion - To be done in PACU    Location: [facility] / Virtual [facility] OR    Surgeons: Anirudh Rehman MD            Relevant Problems   Anesthesia (within normal limits)      Cardiovascular   (+) Aortic stenosis   (+) Aortic valve disorder   (+) Atrial fibrillation (CMS/HCC)   (+) Atrial fibrillation and flutter (CMS/HCC)   (+) Hypercholesterolemia   (+) Hypertension   (+) Systolic congestive heart failure, unspecified HF chronicity (CMS/HCC)      Hematology   (+) Anemia   (+) Long term (current) use of anticoagulants      Musculoskeletal   (+) Osteoarthritis      Eyes, Ears, Nose, and Throat   (+) POAG (primary open-angle glaucoma)       Clinical information reviewed:   Tobacco  Allergies  Meds  Problems  Med Hx  Surg Hx   Fam Hx  Soc   Hx        NPO Detail:  No data recorded     Physical Exam    Airway  Mallampati: II  TM distance: >3 FB     Cardiovascular    Dental - normal exam     Pulmonary    Abdominal            Anesthesia Plan    ASA 3     MAC     intravenous induction   Anesthetic plan and risks discussed with patient.

## 2023-12-28 NOTE — PROGRESS NOTES
Physical Therapy    Physical Therapy Evaluation    Patient Name: Jose Martin Fleming  MRN: [id number]  Today's Date: 12/28/2023   Time Calculation  Start Time: 0908  Stop Time: 0920  Time Calculation (min): 12 min    Assessment/Plan   PT Assessment  Evaluation/Treatment Tolerance: Patient tolerated treatment well  Medical Staff Made Aware: Yes  End of Session Communication: Bedside nurse  End of Session Patient Position: Bed, 3 rail up  IP OR SWING BED PT PLAN  Inpatient or Swing Bed: Inpatient  PT Plan  PT Plan: PT Eval only  PT Eval Only Reason: At baseline function  PT Discharge Recommendations: No further acute PT  PT Recommended Transfer Status: Independent  PT - OK to Discharge: Yes      Subjective   General Visit Information:  General  Reason for Referral: impaired mobility  Referred By: Dr. Brunson  Past Medical History Relevant to Rehab: anemia, aortic stenosis, C VA, afib, HTN  Missed Visit: Yes  Missed Visit Reason: Cancel  Family/Caregiver Present: No  Prior to Session Communication: Bedside nurse  Patient Position Received: Bed, 3 rail up  Preferred Learning Style: verbal  General Comment: 88 year old male admits with Afib and CHF  Home Living:  Home Living  Type of Home: Condo  Lives With: Spouse  Home Adaptive Equipment: Walker rolling or standard  Home Layout: Able to live on main level with bedroom/bathroom  Home Access: Stairs to enter without rails  Entrance Stairs-Rails: None  Entrance Stairs-Number of Steps: 1  Bathroom Shower/Tub: Tub/shower unit  Prior Level of Function:  Prior Function Per Pt/Caregiver Report  Level of [place]: Independent with ADLs and functional transfers, Independent with homemaking with ambulation  Receives Help From: Family  ADL Assistance: Independent  Homemaking Assistance: Independent  Ambulatory Assistance: Independent  Vocational: Retired  Hand Dominance: Right  Precautions:     Vital Signs:  Vital Signs  Heart Rate: (!) 113  Heart Rate Source: Monitor    Objective    Pain:  Pain Assessment  Pain Assessment: 0-10  Pain Score: 0 - No pain  Cognition:  Cognition  Overall Cognitive Status: Within Functional Limits    General Assessments:  Activity Tolerance  Endurance: Endurance does not limit participation in activity    Sensation  Light Touch: No apparent deficits  Functional Assessments:  Bed Mobility  Bed Mobility: Yes  Bed Mobility 1  Bed Mobility 1: Supine to sitting  Level of Assistance 1: Independent  Bed Mobility 2  Bed Mobility  2: Sitting to supine  Level of Assistance 2: Independent    Transfers  Transfer: Yes  Transfer 1  Transfer From 1: Bed to  Transfer to 1: Stand  Technique 1: Sit to stand  Transfer Level of Assistance 1: Independent    Ambulation/Gait Training  Ambulation/Gait Training Performed: Yes  Ambulation/Gait Training 1  Surface 1: Level tile  Device 1: No device  Assistance 1: Independent  Comments/Distance (ft) 1: 60+10  Extremity/Trunk Assessments:  RLE   RLE : Within Functional Limits  LLE   LLE : Within Functional Limits  Outcome Measures:  [facility] Basic Mobility  Turning from your back to your side while in a flat bed without using bedrails: None  Moving from lying on your back to sitting on the side of a flat bed without using bedrails: None  Moving to and from bed to chair (including a wheelchair): None  Standing up from a chair using your arms (e.g. wheelchair or bedside chair): None  To walk in hospital room: None  Climbing 3-5 steps with railing: None  Basic Mobility - Total Score: 24    Encounter Problems       Encounter Problems (Resolved)       Pain - Adult              Education Documentation  No documentation found.  Education Comments  No comments found.

## 2023-12-29 ENCOUNTER — TELEPHONE (OUTPATIENT)
Dept: CARDIOLOGY | Facility: CLINIC | Age: 88
End: 2023-12-29
Payer: MEDICARE

## 2023-12-29 ENCOUNTER — PATIENT OUTREACH (OUTPATIENT)
Dept: CASE MANAGEMENT | Facility: HOSPITAL | Age: 88
End: 2023-12-29
Payer: MEDICARE

## 2023-12-29 VITALS
HEART RATE: 65 BPM | WEIGHT: 164.02 LBS | BODY MASS INDEX: 23.48 KG/M2 | DIASTOLIC BLOOD PRESSURE: 69 MMHG | SYSTOLIC BLOOD PRESSURE: 99 MMHG | RESPIRATION RATE: 20 BRPM | OXYGEN SATURATION: 94 % | TEMPERATURE: 96.8 F | HEIGHT: 70 IN

## 2023-12-29 LAB
ALBUMIN SERPL-MCNC: 3.3 G/DL (ref 3.5–5)
ALP BLD-CCNC: 76 U/L (ref 35–125)
ALT SERPL-CCNC: 39 U/L (ref 5–40)
ANION GAP SERPL CALC-SCNC: 14 MMOL/L
AST SERPL-CCNC: 30 U/L (ref 5–40)
BASOPHILS # BLD AUTO: 0.02 X10*3/UL (ref 0–0.1)
BASOPHILS NFR BLD AUTO: 0.2 %
BILIRUB SERPL-MCNC: 0.6 MG/DL (ref 0.1–1.2)
BUN SERPL-MCNC: 48 MG/DL (ref 8–25)
CALCIUM SERPL-MCNC: 8.7 MG/DL (ref 8.5–10.4)
CHLORIDE SERPL-SCNC: 105 MMOL/L (ref 97–107)
CO2 SERPL-SCNC: 20 MMOL/L (ref 24–31)
CREAT SERPL-MCNC: 1.8 MG/DL (ref 0.4–1.6)
EOSINOPHIL # BLD AUTO: 0.16 X10*3/UL (ref 0–0.4)
EOSINOPHIL NFR BLD AUTO: 1.5 %
ERYTHROCYTE [DISTWIDTH] IN BLOOD BY AUTOMATED COUNT: 13.9 % (ref 11.5–14.5)
GFR SERPL CREATININE-BSD FRML MDRD: 36 ML/MIN/1.73M*2
GLUCOSE SERPL-MCNC: 91 MG/DL (ref 65–99)
HCT VFR BLD AUTO: 42.3 % (ref 41–52)
HGB BLD-MCNC: 13.3 G/DL (ref 13.5–17.5)
IMM GRANULOCYTES # BLD AUTO: 0.06 X10*3/UL (ref 0–0.5)
IMM GRANULOCYTES NFR BLD AUTO: 0.6 % (ref 0–0.9)
INR PPP: 3.6 (ref 0.9–1.2)
LYMPHOCYTES # BLD AUTO: 0.81 X10*3/UL (ref 0.8–3)
LYMPHOCYTES NFR BLD AUTO: 7.8 %
MCH RBC QN AUTO: 30.1 PG (ref 26–34)
MCHC RBC AUTO-ENTMCNC: 31.4 G/DL (ref 32–36)
MCV RBC AUTO: 96 FL (ref 80–100)
MONOCYTES # BLD AUTO: 1.13 X10*3/UL (ref 0.05–0.8)
MONOCYTES NFR BLD AUTO: 10.9 %
NEUTROPHILS # BLD AUTO: 8.22 X10*3/UL (ref 1.6–5.5)
NEUTROPHILS NFR BLD AUTO: 79 %
NRBC BLD-RTO: 0 /100 WBCS (ref 0–0)
PLATELET # BLD AUTO: 271 X10*3/UL (ref 150–450)
POTASSIUM SERPL-SCNC: 4.4 MMOL/L (ref 3.4–5.1)
PROT SERPL-MCNC: 6.5 G/DL (ref 5.9–7.9)
PROTHROMBIN TIME: 34.4 SECONDS (ref 9.3–12.7)
RBC # BLD AUTO: 4.42 X10*6/UL (ref 4.5–5.9)
SODIUM SERPL-SCNC: 139 MMOL/L (ref 133–145)
WBC # BLD AUTO: 10.4 X10*3/UL (ref 4.4–11.3)

## 2023-12-29 PROCEDURE — 85025 COMPLETE CBC W/AUTO DIFF WBC: CPT | Performed by: INTERNAL MEDICINE

## 2023-12-29 PROCEDURE — 84075 ASSAY ALKALINE PHOSPHATASE: CPT | Performed by: INTERNAL MEDICINE

## 2023-12-29 PROCEDURE — 36415 COLL VENOUS BLD VENIPUNCTURE: CPT | Performed by: INTERNAL MEDICINE

## 2023-12-29 PROCEDURE — 2500000001 HC RX 250 WO HCPCS SELF ADMINISTERED DRUGS (ALT 637 FOR MEDICARE OP): Performed by: INTERNAL MEDICINE

## 2023-12-29 PROCEDURE — 92960 CARDIOVERSION ELECTRIC EXT: CPT | Performed by: INTERNAL MEDICINE

## 2023-12-29 PROCEDURE — 85610 PROTHROMBIN TIME: CPT | Performed by: INTERNAL MEDICINE

## 2023-12-29 PROCEDURE — 99232 SBSQ HOSP IP/OBS MODERATE 35: CPT | Performed by: INTERNAL MEDICINE

## 2023-12-29 PROCEDURE — 2500000004 HC RX 250 GENERAL PHARMACY W/ HCPCS (ALT 636 FOR OP/ED): Performed by: INTERNAL MEDICINE

## 2023-12-29 PROCEDURE — 2500000002 HC RX 250 W HCPCS SELF ADMINISTERED DRUGS (ALT 637 FOR MEDICARE OP, ALT 636 FOR OP/ED): Performed by: INTERNAL MEDICINE

## 2023-12-29 RX ORDER — METOPROLOL SUCCINATE 50 MG/1
50 TABLET, EXTENDED RELEASE ORAL DAILY
Status: DISCONTINUED | OUTPATIENT
Start: 2023-12-30 | End: 2023-12-29 | Stop reason: HOSPADM

## 2023-12-29 RX ORDER — FUROSEMIDE 40 MG/1
40 TABLET ORAL DAILY
Qty: 30 TABLET | Refills: 0 | Status: SHIPPED | OUTPATIENT
Start: 2023-12-30 | End: 2024-01-17 | Stop reason: SDUPTHER

## 2023-12-29 RX ORDER — WARFARIN 2.5 MG/1
2.5 TABLET ORAL DAILY
Status: DISCONTINUED | OUTPATIENT
Start: 2023-12-29 | End: 2023-12-29 | Stop reason: HOSPADM

## 2023-12-29 RX ORDER — WARFARIN 2.5 MG/1
2.5 TABLET ORAL EVERY EVENING
Start: 2023-12-29 | End: 2024-03-20 | Stop reason: ALTCHOICE

## 2023-12-29 RX ORDER — AMIODARONE HYDROCHLORIDE 200 MG/1
200 TABLET ORAL DAILY
Status: DISCONTINUED | OUTPATIENT
Start: 2023-12-30 | End: 2023-12-29 | Stop reason: HOSPADM

## 2023-12-29 RX ORDER — AMIODARONE HYDROCHLORIDE 200 MG/1
200 TABLET ORAL DAILY
Qty: 30 TABLET | Refills: 0 | Status: SHIPPED | OUTPATIENT
Start: 2023-12-30 | End: 2024-01-17 | Stop reason: SDUPTHER

## 2023-12-29 RX ORDER — METOPROLOL SUCCINATE 50 MG/1
50 TABLET, EXTENDED RELEASE ORAL DAILY
Start: 2023-12-30

## 2023-12-29 RX ADMIN — FUROSEMIDE 40 MG: 40 TABLET ORAL at 08:27

## 2023-12-29 RX ADMIN — COLESEVELAM HYDROCHLORIDE 1250 MG: 625 TABLET, COATED ORAL at 11:15

## 2023-12-29 RX ADMIN — AMIODARONE HYDROCHLORIDE 200 MG: 200 TABLET ORAL at 08:27

## 2023-12-29 RX ADMIN — LOSARTAN POTASSIUM 100 MG: 100 TABLET, FILM COATED ORAL at 08:27

## 2023-12-29 RX ADMIN — PANTOPRAZOLE SODIUM 40 MG: 40 TABLET, DELAYED RELEASE ORAL at 07:00

## 2023-12-29 RX ADMIN — ASPIRIN 81 MG: 81 TABLET, CHEWABLE ORAL at 08:27

## 2023-12-29 NOTE — PROGRESS NOTES
Jose Martin Fleming is a 88 y.o. male on day 2 of admission presenting with Atrial fibrillation and flutter (CMS/HCC).      Subjective   He was cardioverted successfully yesterday  Feels better today wants to go home and drive to [place] in a week       Objective     Last Recorded Vitals  /63 (BP Location: Left arm, Patient Position: Lying)   Pulse 65   Temp 36.3 °C (97.3 °F) (Temporal)   Resp 21   Wt 74.4 kg (164 lb 0.4 oz)   SpO2 97%   Intake/Output last 3 Shifts:    Intake/Output Summary (Last 24 hours) at 12/29/2023 0916  Last data filed at 12/28/2023 1207  Gross per 24 hour   Intake 150 ml   Output --   Net 150 ml       Physical Exam  Alert Roger x 3 cooperative  Chest a few bibasilar crackles  Heart regular sinus rhythm monitor  Abdomen soft nontender  Extremities no edema  Neurologic exam nonfocal  Relevant Results  Reviewed INR is 3.6  Assessment/Plan     Principal Problem:    Atrial fibrillation and flutter (CMS/HCC)  Active Problems:    Systolic congestive heart failure, unspecified HF chronicity (CMS/HCC)      Will resume warfarin today after 2 days of downtrend in INR.  Will decrease the dose.  Will check room air pulse ox and heart rate with ambulation and if cleared by cardiology would proceed with discharge.  Will need to decide on diuretics per cardiology advice             Dustin Brunson MD

## 2023-12-29 NOTE — PROGRESS NOTES
Patient is from home with spouse.  They are going to [place] 1/2024.  Therapy was discontinued as patient is independent with mobility.  Patient will return home with no needs.  RN TCC following.     1510  Met with patient in room to discuss need for RN to do Bps and educate on how to use BP cuff at home.  Patient advised that he goes to [facility] to have his BP's checked.  His son is coming tonight with his BP cuff to assist patient with choosing and using the BP cuff.  Patient denies need for RN to come out and show him.  RN [facility] following.    Stacie Frias RN

## 2023-12-29 NOTE — PROGRESS NOTES
Subjective Data:  Patient without any complaints    Overnight Events:    As discussed below.     Objective Data:  Last Recorded Vitals:  Vitals:    12/29/23 0022 12/29/23 0344 12/29/23 0738 12/29/23 1127   BP: 105/68 122/67 121/63 99/69   BP Location: Left arm Left arm Left arm Right arm   Patient Position: Lying Lying Lying Sitting   Pulse: 57 62 65 65   Resp: 23 21 21 20   Temp: 36.2 °C (97.2 °F) 35.9 °C (96.6 °F) 36.3 °C (97.3 °F) 36 °C (96.8 °F)   TempSrc: Temporal Temporal Temporal Temporal   SpO2: 98% 95% 97% 94%   Weight:  74.4 kg (164 lb 0.4 oz)     Height:           Last Labs:  CBC - 12/29/2023:  5:29 AM  10.4 13.3 271    42.3      CMP - 12/29/2023:  5:29 AM  8.7 6.5 30 --- 0.6   _ 3.3 39 76      PTT - 12/26/2023: 10:15 AM  3.6   34.4 34.7     HGBA1C   Date/Time Value Ref Range Status   11/20/2023 08:24 AM 5.3 See below % Final   12/01/2022 08:39 AM 5.6 4.0 - 6.0 % Final     Comment:     Hemoglobin A1C levels are related to mean blood glucose during the   preceding 2-3 months. The relationship table below may be used as a   general guide. Each 1% increase in HGB A1C is a reflection of an   increase in mean glucose of approximately 30 mg/dl.   Reference: Diabetes Care, volume 29, supplement 1 Jan. 2006                        HGB A1C ................. Approx. Mean Glucose   _______________________________________________   6%   ...............................  120 mg/dl   7%   ...............................  150 mg/dl   8%   ...............................  180 mg/dl   9%   ...............................  210 mg/dl   10%  ...............................  240 mg/dl  Performed at [address] [address]     11/30/2021 09:01 AM 5.5 4.0 - 6.0 % Final     Comment:     Hemoglobin A1C levels are related to mean blood glucose during the   preceding 2-3 months. The relationship table below may be used as a   general guide. Each 1% increase in HGB A1C is a reflection of an   increase in mean  glucose of approximately 30 mg/dl.   Reference: Diabetes Care, volume 29, supplement 1 Jan. 2006                        HGB A1C ................. Approx. Mean Glucose   _______________________________________________   6%   ...............................  120 mg/dl   7%   ...............................  150 mg/dl   8%   ...............................  180 mg/dl   9%   ...............................  210 mg/dl   10%  ...............................  240 mg/dl  Performed at [address] [address]     03/17/2021 09:23 AM 5.4 <6.0 % Final     Comment:     Hemoglobin A1c Interpretive Ranges:  Results between 6.0 to 6.5 are considered 'prediabetic'.  Results above 6.5% are diabetes 'cut point' based on criteria  from the Diabetes Care and International Expert Committee  Report (Diabetes Care, 2009). Therapeutic action is suggested  at levels above 6.5% HbA1c.     LDLCALC   Date/Time Value Ref Range Status   11/20/2023 08:24  65 - 130 mg/dL Final   12/01/2022 08:39  65 - 130 MG/DL Final   11/30/2021 09:01  65 - 130 MG/DL Final   11/05/2020 09:41  65 - 130 MG/DL Final      Last I/O:  I/O last 3 completed shifts:  In: 150 (2 mL/kg) [IV Piggyback:150]  Out: - (0 mL/kg)   Weight: 74.4 kg     Past Cardiology Tests (Last 3 Years):  EKG:  ECG 12 Lead 12/28/2023 (Preliminary)      ECG 12 Lead 12/27/2023      ECG 12 lead (Clinic Performed)     Echo:  Transthoracic Echo (TTE) Complete 12/26/2023    Ejection Fractions:  EF   Date/Time Value Ref Range Status   12/26/2023 03:50 PM 24       Cath:  No results found for this or any previous visit from the past 1095 days.    Stress Test:  No results found for this or any previous visit from the past 1095 days.    Cardiac Imaging:  No results found for this or any previous visit from the past 1095 days.      Inpatient Medications:  Scheduled medications   Medication Dose Route Frequency    [START ON 12/30/2023] amiodarone  200 mg oral  Daily    aspirin  81 mg oral Daily    colesevelam  1,250 mg oral Daily with lunch    furosemide  40 mg oral Daily    losartan  100 mg oral Daily    melatonin  3 mg oral Daily    [START ON 12/30/2023] metoprolol succinate XL  50 mg oral Daily    pantoprazole  40 mg oral Daily before breakfast    Or    pantoprazole  40 mg intravenous Daily before breakfast    perflutren lipid microspheres  0.5-10 mL of dilution intravenous Once in imaging    perflutren protein A microsphere  0.5 mL intravenous Once in imaging    polyethylene glycol  17 g oral Daily    sulfur hexafluoride microsphr  2 mL intravenous Once in imaging    warfarin  2.5 mg oral Daily     PRN medications   Medication    acetaminophen    Or    acetaminophen    Or    acetaminophen     Continuous Medications   Medication Dose Last Rate       Physical Exam:  The patient is a well-appearing nonobese upper elderly white male sitting upright in bed previously ambulatory.  He is not short of breath even on room air.  JVP not elevated carotid and pulses 2+  Chest fair air movement breath sounds are diminished at the lung bases posteriorly  Cardiac rhythm is regular S1 and S2 her prosthetic minimal systolic murmur  Abdomen is soft and benign  No peripheral edema pedal pulses are present.     Assessment/Plan   12/27: The patient is a 88-year-old white male whose past history includes in part a [employer] Aleksandar's mechanical bileaflet aortic valve replacement for severe aortic valvular stenosis and mild aortic valve insufficiency secondary to congenital bicuspid aortic valve on 10/19/1988.  The patient has been on long-term Coumadin anticoagulation.  He also has a history of hypertension hyperlipidemia former smoking and chronic systolic CHF with echocardiogram in 4/2022 estimating the LV ejection fraction at 35% with mild to moderate mitral valve regurgitation and mild to moderate pulmonary hypertension.  Patient recently detected to have apparent atrial fibrillation with  rapid ventricular rate versus an atypical atrial flutter versus ectopic atrial tachycardia.  He was seen by his primary care physician on 12/8/2023 noted to have a heart rate in the 120s per minute?  Atrial fibrillation.  His dose of Toprol-XL was increased from 50 mg daily to 100 mg daily and then 150 mg daily without any slowing in the ventricular rate still in the 125/min range up to 130.  The patient has developed increasing shortness of breath during this period of time.  EKG tracings appear to show a regular narrow complex QRS tachycardia with incomplete left bundle branch block conduction delay.  The regularity of the impersistence of the rhythm would suggest either at an ectopic atrial tachycardia or atypical atrial flutter which is not responding to escalating doses of metoprolol.  Amiodarone was added yesterday but suspect the patient will require electrical DC cardioversion which will be scheduled for tomorrow.  The patient has been on long-term Coumadin anticoagulation INR supratherapeutic yesterday.  His echocardiogram shows a further reduction in the LV ejection fraction now at 20-25%.  Suspect that the patient's persistent tachycardia resulted in acute on chronic systolic CHF with a component of tachycardia mediated cardiomyopathy superimposed on the previously known cardiomyopathy.  The patient will be started on low-dose Lasix 40 mg daily.  He is already on losartan 100 mg daily.  Will continue Coumadin as per INR.      12/28: The patient underwent electrical DC cardioversion in the PACU using a biphasic defibrillator at 100 J with restoration of sinus rhythm sinus bradycardia with atrial ectopic activity.  The dose of amiodarone will be reduced to 200 mg twice daily and will also decrease Toprol-XL 50 mg twice daily.  His INR was 3.7 at the time of the procedure.  The patient will need observation for another 1 to 3 days to continue with treatment of his acute on chronic systolic CHF.  Patient will  remain on Lasix 40 mg daily at time of discharge.  Labs from earlier today include creatinine 1.5 potassium of 4.3. CBC essentially unremarkable other than hemoglobin 13.1 hematocrit 40.6.    12/29: The patient seen and events reviewed in detail.  The patient's electrical DC cardioversion performed yesterday was successful and he remains in sinus rhythm in the 60s per minute range.  Toprol-XL was held yesterday but will be restarted today at lower dose 50 mg daily.  Will remain on the amiodarone 200 mg with the dose being reduced to once daily as well.  Due to the findings of his echocardiogram and the presence of pleural effusions initial chest x-ray he will be continued on Lasix 40 mg daily and with potassium supplementation.  He will remain on previously prescribed losartan 100 mg daily.  Patient was educated as to the interaction between the amiodarone and Coumadin and that a reduction in the dose of the Coumadin may be necessary in the future.  His INR is 3.6 today.  He will be advised to return to the Coumadin clinic and for cardiology follow-up in either 7 or 10 days prior to his planned trip to [place].  Peripheral IV 12/26/23 22 G Distal;Right;Upper Arm (Active)   Site Assessment Intact;Dry;Clean 12/29/23 0800   Dressing Status Clean;Dry;Occlusive 12/29/23 0800   Number of days: 3       Peripheral IV 12/26/23 20 G Distal;Left;Upper;Anterior Arm (Active)   Site Assessment Clean;Dry;Intact 12/29/23 0800   Dressing Status Dry;Clean;Occlusive 12/29/23 0800   Number of days: 3       Code Status:  Full Code    I spent 20 minutes in the professional and overall care of this patient.        Bk Guillory RN

## 2023-12-29 NOTE — PROGRESS NOTES
Met with patient  & spouse @ bedside for education reinforcement. Discussed CHF, signs and symptoms, and when to call cardiologist. Reinforced the importance of following a Low Sodium Diet and monitoring daily weight, lower leg edema, and shortness of breath. Reviewed importance of taking prescribed medications after discharge. Reinforced importance of following up with cardiologist within a week of discharge. Reminded patient that they had my contact information should any questions or concerns arise. I will continue to follow after discharge for CHF management.

## 2023-12-29 NOTE — PROGRESS NOTES
Subjective Data:  Patient without any complaints    Overnight Events:    As discussed below.     Objective Data:  Last Recorded Vitals:  Vitals:    12/29/23 0022 12/29/23 0344 12/29/23 0738 12/29/23 1127   BP: 105/68 122/67 121/63 99/69   BP Location: Left arm Left arm Left arm Right arm   Patient Position: Lying Lying Lying Sitting   Pulse: 57 62 65 65   Resp: 23 21 21 20   Temp: 36.2 °C (97.2 °F) 35.9 °C (96.6 °F) 36.3 °C (97.3 °F) 36 °C (96.8 °F)   TempSrc: Temporal Temporal Temporal Temporal   SpO2: 98% 95% 97% 94%   Weight:  74.4 kg (164 lb 0.4 oz)     Height:           Last Labs:  CBC - 12/29/2023:  5:29 AM  10.4 13.3 271    42.3      CMP - 12/29/2023:  5:29 AM  8.7 6.5 30 --- 0.6   _ 3.3 39 76      PTT - 12/26/2023: 10:15 AM  3.6   34.4 34.7     HGBA1C   Date/Time Value Ref Range Status   11/20/2023 08:24 AM 5.3 See below % Final   12/01/2022 08:39 AM 5.6 4.0 - 6.0 % Final     Comment:     Hemoglobin A1C levels are related to mean blood glucose during the   preceding 2-3 months. The relationship table below may be used as a   general guide. Each 1% increase in HGB A1C is a reflection of an   increase in mean glucose of approximately 30 mg/dl.   Reference: Diabetes Care, volume 29, supplement 1 Jan. 2006                        HGB A1C ................. Approx. Mean Glucose   _______________________________________________   6%   ...............................  120 mg/dl   7%   ...............................  150 mg/dl   8%   ...............................  180 mg/dl   9%   ...............................  210 mg/dl   10%  ...............................  240 mg/dl  Performed at [address] [address]     11/30/2021 09:01 AM 5.5 4.0 - 6.0 % Final     Comment:     Hemoglobin A1C levels are related to mean blood glucose during the   preceding 2-3 months. The relationship table below may be used as a   general guide. Each 1% increase in HGB A1C is a reflection of an   increase in mean  glucose of approximately 30 mg/dl.   Reference: Diabetes Care, volume 29, supplement 1 Jan. 2006                        HGB A1C ................. Approx. Mean Glucose   _______________________________________________   6%   ...............................  120 mg/dl   7%   ...............................  150 mg/dl   8%   ...............................  180 mg/dl   9%   ...............................  210 mg/dl   10%  ...............................  240 mg/dl  Performed at [address] [address]     03/17/2021 09:23 AM 5.4 <6.0 % Final     Comment:     Hemoglobin A1c Interpretive Ranges:  Results between 6.0 to 6.5 are considered 'prediabetic'.  Results above 6.5% are diabetes 'cut point' based on criteria  from the Diabetes Care and International Expert Committee  Report (Diabetes Care, 2009). Therapeutic action is suggested  at levels above 6.5% HbA1c.     LDLCALC   Date/Time Value Ref Range Status   11/20/2023 08:24  65 - 130 mg/dL Final   12/01/2022 08:39  65 - 130 MG/DL Final   11/30/2021 09:01  65 - 130 MG/DL Final   11/05/2020 09:41  65 - 130 MG/DL Final      Last I/O:  I/O last 3 completed shifts:  In: 150 (2 mL/kg) [IV Piggyback:150]  Out: - (0 mL/kg)   Weight: 74.4 kg     Past Cardiology Tests (Last 3 Years):  EKG:  ECG 12 Lead 12/28/2023 (Preliminary)      ECG 12 Lead 12/27/2023      ECG 12 lead (Clinic Performed)     Echo:  Transthoracic Echo (TTE) Complete 12/26/2023    Ejection Fractions:  EF   Date/Time Value Ref Range Status   12/26/2023 03:50 PM 24       Cath:  No results found for this or any previous visit from the past 1095 days.    Stress Test:  No results found for this or any previous visit from the past 1095 days.    Cardiac Imaging:  No results found for this or any previous visit from the past 1095 days.      Inpatient Medications:  Scheduled medications   Medication Dose Route Frequency    amiodarone  200 mg oral BID    aspirin  81 mg oral  Daily    colesevelam  1,250 mg oral Daily with lunch    furosemide  40 mg oral Daily    losartan  100 mg oral Daily    melatonin  3 mg oral Daily    [Held by provider] metoprolol succinate XL  50 mg oral q12h    pantoprazole  40 mg oral Daily before breakfast    Or    pantoprazole  40 mg intravenous Daily before breakfast    perflutren lipid microspheres  0.5-10 mL of dilution intravenous Once in imaging    perflutren protein A microsphere  0.5 mL intravenous Once in imaging    polyethylene glycol  17 g oral Daily    sulfur hexafluoride microsphr  2 mL intravenous Once in imaging    warfarin  2.5 mg oral Daily     PRN medications   Medication    acetaminophen    Or    acetaminophen    Or    acetaminophen     Continuous Medications   Medication Dose Last Rate       Physical Exam:  The patient is a well-appearing nonobese upper elderly white male sitting upright in bed previously ambulatory.  He is not short of breath even on room air.  JVP not elevated carotid and pulses 2+  Chest fair air movement breath sounds are diminished at the lung bases posteriorly  Cardiac rhythm is regular S1 and S2 her prosthetic minimal systolic murmur  Abdomen is soft and benign  No peripheral edema pedal pulses are present.     Assessment/Plan   12/27: The patient is a 88-year-old white male whose past history includes in part a [employer] Aleksandar's mechanical bileaflet aortic valve replacement for severe aortic valvular stenosis and mild aortic valve insufficiency secondary to congenital bicuspid aortic valve on 10/19/1988.  The patient has been on long-term Coumadin anticoagulation.  He also has a history of hypertension hyperlipidemia former smoking and chronic systolic CHF with echocardiogram in 4/2022 estimating the LV ejection fraction at 35% with mild to moderate mitral valve regurgitation and mild to moderate pulmonary hypertension.  Patient recently detected to have apparent atrial fibrillation with rapid ventricular rate versus an  atypical atrial flutter versus ectopic atrial tachycardia.  He was seen by his primary care physician on 12/8/2023 noted to have a heart rate in the 120s per minute?  Atrial fibrillation.  His dose of Toprol-XL was increased from 50 mg daily to 100 mg daily and then 150 mg daily without any slowing in the ventricular rate still in the 125/min range up to 130.  The patient has developed increasing shortness of breath during this period of time.  EKG tracings appear to show a regular narrow complex QRS tachycardia with incomplete left bundle branch block conduction delay.  The regularity of the impersistence of the rhythm would suggest either at an ectopic atrial tachycardia or atypical atrial flutter which is not responding to escalating doses of metoprolol.  Amiodarone was added yesterday but suspect the patient will require electrical DC cardioversion which will be scheduled for tomorrow.  The patient has been on long-term Coumadin anticoagulation INR supratherapeutic yesterday.  His echocardiogram shows a further reduction in the LV ejection fraction now at 20-25%.  Suspect that the patient's persistent tachycardia resulted in acute on chronic systolic CHF with a component of tachycardia mediated cardiomyopathy superimposed on the previously known cardiomyopathy.  The patient will be started on low-dose Lasix 40 mg daily.  He is already on losartan 100 mg daily.  Will continue Coumadin as per INR.      12/28: The patient underwent electrical DC cardioversion in the PACU using a biphasic defibrillator at 100 J with restoration of sinus rhythm sinus bradycardia with atrial ectopic activity.  The dose of amiodarone will be reduced to 200 mg twice daily and will also decrease Toprol-XL 50 mg twice daily.  His INR was 3.7 at the time of the procedure.  The patient will need observation for another 1 to 3 days to continue with treatment of his acute on chronic systolic CHF.  Patient will remain on Lasix 40 mg daily at  time of discharge.  Labs from earlier today include creatinine 1.5 potassium of 4.3. CBC essentially unremarkable other than hemoglobin 13.1 hematocrit 40.6.    12/29: The patient seen and events reviewed in detail.  The patient's electrical DC cardioversion performed yesterday was successful and he remains in sinus rhythm in the 60s per minute range.  Toprol-XL was held yesterday but will be restarted today at lower dose 50 mg daily.  Will remain on the amiodarone 200 mg with the dose being reduced to once daily as well.  Due to the findings of his echocardiogram and the presence of pleural effusions initial chest x-ray he will be continued on Lasix 40 mg daily and with potassium supplementation.  He will remain on previously prescribed losartan 100 mg daily.  Patient was educated as to the interaction between the amiodarone and Coumadin and that a reduction in the dose of the Coumadin may be necessary in the future.  His INR is 3.6 today.  He will be advised to return to the Coumadin clinic and for cardiology follow-up in either 7 or 10 days prior to his planned trip to [place].  Peripheral IV 12/26/23 22 G Distal;Right;Upper Arm (Active)   Site Assessment Intact;Dry;Clean 12/29/23 0800   Dressing Status Clean;Dry;Occlusive 12/29/23 0800   Number of days: 3       Peripheral IV 12/26/23 20 G Distal;Left;Upper;Anterior Arm (Active)   Site Assessment Clean;Dry;Intact 12/29/23 0800   Dressing Status Dry;Clean;Occlusive 12/29/23 0800   Number of days: 3       Code Status:  Full Code    I spent 20 minutes in the professional and overall care of this patient.        Anirudh Rehman MD

## 2023-12-29 NOTE — DISCHARGE SUMMARY
Discharge Diagnosis  Atrial fibrillation 2 atrial tachycardia status post cardioversion study heart failure      Issues Requiring Follow-Up  Follow-up INR next week follow-up with cardiology in 2 weeks    Discharge Meds     Your medication list        START taking these medications        Instructions Last Dose Given Next Dose Due   amiodarone 200 mg tablet  Commonly known as: Pacerone  Start taking on: December 30, 2023      Take 1 tablet (200 mg) by mouth once daily. Do not start before December 30, 2023.       furosemide 40 mg tablet  Commonly known as: Lasix  Start taking on: December 30, 2023      Take 1 tablet (40 mg) by mouth once daily. Do not start before December 30, 2023.              CHANGE how you take these medications        Instructions Last Dose Given Next Dose Due   metoprolol succinate XL 50 mg 24 hr tablet  Commonly known as: Toprol XL  Start taking on: December 30, 2023  What changed:   how much to take  additional instructions      Take 1 tablet (50 mg) by mouth once daily. Do not crush or chew. Do not start before December 30, 2023.       warfarin 2.5 mg tablet  Commonly known as: Coumadin  What changed:   when to take this  Another medication with the same name was removed. Continue taking this medication, and follow the directions you see here.      Take as directed. If you are unsure how to take this medication, talk to your nurse or doctor.  Original instructions: Take 1 tablet (2.5 mg) by mouth once daily in the evening. 1 tablet oral every Tuesday, Thursday, Saturday, Sunday              CONTINUE taking these medications        Instructions Last Dose Given Next Dose Due   ASPIR-81 ORAL           b complex 0.4 mg tablet           clotrimazole-betamethasone cream  Commonly known as: Lotrisone           losartan 100 mg tablet  Commonly known as: Cozaar           WelChoL 625 mg tablet  Generic drug: colesevelam           ZINC ACETATE ORAL                     Where to Get Your Medications         These medications were sent to [id number] - [place], [place] - [address]  [address] [place] [address] [place] [address]      Phone: [phone]   amiodarone 200 mg tablet  furosemide 40 mg tablet       Information about where to get these medications is not yet available    Ask your nurse or doctor about these medications  metoprolol succinate XL 50 mg 24 hr tablet  warfarin 2.5 mg tablet         Test Results Pending At Discharge  Pending Labs       Order Current Status    Serial Troponin, 6 Hour ([facility]) Collected (12/26/23 1414)    Troponin T Series, High Sensitivity (0, 2HR, 6HR) In process            Hospital Course  Patient admitted through the emergency room with palpitations.  He has had palpitations for at least 10 days prior to this admission metoprolol dose was increased from 50 once a day to 3 times a day by his primary without effect.  In emergency room he was found to have narrow complex tachycardia in the range of 1/21/1930 which I felt was of normal rhythm.  Cardiology agrees started him on amiodarone drip and eventually cardioverted him.  He is remaining in sinus rhythm since then.  He is echo showed systolic dysfunction he was started on Lasix oral he is tolerating ambulation without need for oxygen he is eager to go home today cardiology has agreed to discharge him back on his home dose of metoprolol 50 mg daily plus Lasix.  Also I am going to decrease his Coumadin dose to 2.5 mg daily as his INR was elevated here.  He is supposed to check INR early next week.  He does have mechanical aortic valve.    Pertinent Physical Exam At Time of Discharge  See my note from earlier today    Outpatient Follow-Up  Per instructions    Time spent on discharge arrangement:  45 minutes    Dustin Brunson MD

## 2023-12-30 NOTE — POST-PROCEDURE NOTE
Physician Transition of Care Summary  Invasive Cardiovascular Lab    Procedure Date: 12/30/2023  Attending:    Coby Rehman - Primary  Resident/Fellow/Other Assistant: Surgeon(s) and Role:    Indications:   * No Diagnosis Codes entered *    Post-procedure diagnosis:   * No Diagnosis Codes entered *    Procedure(s):   Cardioversion  21681 - NM CARDIOVERSION ELECTIVE ARRHYTHMIA EXTERNAL        Procedure Findings:   Atrial flutter    Description of the Procedure:   Synchronized electrical DC cardioversion was performed using a biphasic defibrillator at 100 J with prompt restoration to sinus rhythm sinus bradycardia with premature atrial beats.  Patient was sedated by anesthesia.    Complications:   No complications    Stents/Implants:       Anticoagulation/Antiplatelet Plan:   Continue prior Coumadin therapy.    Estimated Blood Loss:   * No values recorded between 12/28/2023 12:00 AM and 12/30/2023  4:47 PM *    Anesthesia: Consult Anesthesia Staff: Anesthesiologist: Mitchell Gross DO  CRNA: ROSALIO Cortez; ROSALIO Healy    Any Specimen(s) Removed:   No specimens collected during this procedure.    Disposition:   Patient to return to stepdown unit.      Electronically signed by: Anirudh Rehman MD, 12/30/2023 4:47 PM

## 2024-01-02 ENCOUNTER — TELEPHONE (OUTPATIENT)
Dept: INPATIENT UNIT | Facility: HOSPITAL | Age: 89
End: 2024-01-02

## 2024-01-02 ENCOUNTER — PATIENT OUTREACH (OUTPATIENT)
Dept: CASE MANAGEMENT | Facility: HOSPITAL | Age: 89
End: 2024-01-02
Payer: MEDICARE

## 2024-01-02 NOTE — PROGRESS NOTES
Follow up phone call made by [facility] Clinical Nurse Navigator. No answer. Left message for CB.

## 2024-01-03 ENCOUNTER — ANTICOAGULATION - WARFARIN VISIT (OUTPATIENT)
Dept: CARDIOLOGY | Facility: CLINIC | Age: 89
End: 2024-01-03
Payer: MEDICARE

## 2024-01-03 DIAGNOSIS — Z95.2 HISTORY OF AORTIC VALVE REPLACEMENT: Primary | ICD-10-CM

## 2024-01-03 DIAGNOSIS — Z79.01 LONG TERM (CURRENT) USE OF ANTICOAGULANTS: ICD-10-CM

## 2024-01-03 LAB
ATRIAL RATE: 58 BPM
P AXIS: 83 DEGREES
P OFFSET: 145 MS
P ONSET: 94 MS
POC INR: 2.1
POC PROTHROMBIN TIME: NORMAL
PR INTERVAL: 238 MS
Q ONSET: 213 MS
QRS COUNT: 9 BEATS
QRS DURATION: 118 MS
QT INTERVAL: 454 MS
QTC CALCULATION(BAZETT): 445 MS
QTC FREDERICIA: 448 MS
R AXIS: -25 DEGREES
T AXIS: 164 DEGREES
T OFFSET: 440 MS
VENTRICULAR RATE: 58 BPM

## 2024-01-03 PROCEDURE — 85610 PROTHROMBIN TIME: CPT | Mod: QW

## 2024-01-03 PROCEDURE — 99211 OFF/OP EST MAY X REQ PHY/QHP: CPT | Performed by: INTERNAL MEDICINE

## 2024-01-03 NOTE — PROGRESS NOTES
Patient identification verified with 2 identifiers.    Location: [facility] - [address] [place], [place] [address] [phone]     Referring Physician: Dr. Anirudh Rehman  Enrollment/ Re-enrollment date: 2024   INR Goal: 2.5-3.5  INR monitoring is per [facility] protocol.  Anticoagulation Medication: warfarin  Indication: Aortic Valve Replacement    Subjective   Bleeding signs/symptoms: No    Bruising: No   Major bleeding event: No  Thrombosis signs/symptoms: No  Thromboembolic event: No  Missed doses: Yes  Pt was discharged from hospital on Warfarin 2.5mg daily.  Extra doses: No  Medication changes: Yes  Pt started on Amiodarone 200mg daily.  Dietary changes: No  Change in health: No  Change in activity: No  Alcohol: No  Other concerns: No    Upcoming Surgeries:  Does the Patient Have any upcoming surgeries that require interruption in anticoagulation therapy? no  Does the patient require bridging? no      Anticoagulation Summary  As of 1/3/2024      INR goal:  2.5-3.5   TTR:  48.6 % (2.8 mo)   INR used for dosin.10 (1/3/2024)   Weekly warfarin total:  20 mg               Assessment/Plan   Subtherapeutic     1. New dose:  Increase weekly dose.      2. Next INR: 5 days      Education provided to patient during the visit:  Patient instructed to call in interim with questions, concerns and changes.   Patient educated on interactions between medications and warfarin.   Patient educated on dietary consistency in vitamin k consumption.   Patient educated on affects of alcohol consumption while taking warfarin.   Patient educated on signs of bleeding/clotting.   Patient educated on compliance with dosing, follow up appointments, and prescribed plan of care.

## 2024-01-04 LAB
ATRIAL RATE: 125 BPM
P OFFSET: 201 MS
P ONSET: 157 MS
PR INTERVAL: 120 MS
Q ONSET: 217 MS
QRS COUNT: 21 BEATS
QRS DURATION: 120 MS
QT INTERVAL: 334 MS
QTC CALCULATION(BAZETT): 482 MS
QTC FREDERICIA: 426 MS
R AXIS: -18 DEGREES
T AXIS: 163 DEGREES
T OFFSET: 384 MS
VENTRICULAR RATE: 125 BPM

## 2024-01-05 ENCOUNTER — TELEPHONE (OUTPATIENT)
Dept: PRIMARY CARE | Facility: CLINIC | Age: 89
End: 2024-01-05
Payer: MEDICARE

## 2024-01-05 NOTE — TELEPHONE ENCOUNTER
----- Message from Ryan Gonzalez MD sent at 1/2/2024  4:32 PM EST -----  Regarding: FW: Post Discharge Clinical Pharmacy Program Referral    ----- Message -----  From: Kareem Coombs PharmD  Sent: 1/2/2024   2:45 PM EST  To: Ryan Gonzalez MD  Subject: Post Discharge Clinical Pharmacy Program Ref#    Hello Dr. Gonzalez,     Your patient, Jose Martin Fleming, has been identified in the hospital as someone who would benefit from participating in the [facility] program, sponsored by Dr. Daniel Lam. This program, facilitated through the PCI/Pharmacist consult agreement, aims to reduce future hospitalizations associated with chronic conditions through medication optimization and monitoring post-discharge, as well as in between their regularly scheduled appointments with you. Your patient will be automatically enrolled into the program for their CHF unless you prefer to decline the referral. If you wish to decline this service, please reply within 5 business days    Kareem Coombs PharmD

## 2024-01-08 ENCOUNTER — LAB (OUTPATIENT)
Dept: LAB | Facility: LAB | Age: 89
End: 2024-01-08
Payer: MEDICARE

## 2024-01-08 ENCOUNTER — HOSPITAL ENCOUNTER (OUTPATIENT)
Dept: CARDIOLOGY | Facility: CLINIC | Age: 89
Discharge: HOME | End: 2024-01-08
Payer: MEDICARE

## 2024-01-08 ENCOUNTER — OFFICE VISIT (OUTPATIENT)
Dept: CARDIOLOGY | Facility: CLINIC | Age: 89
End: 2024-01-08
Payer: MEDICARE

## 2024-01-08 ENCOUNTER — ANTICOAGULATION - WARFARIN VISIT (OUTPATIENT)
Dept: CARDIOLOGY | Facility: CLINIC | Age: 89
End: 2024-01-08
Payer: MEDICARE

## 2024-01-08 VITALS
BODY MASS INDEX: 23.7 KG/M2 | OXYGEN SATURATION: 95 % | SYSTOLIC BLOOD PRESSURE: 126 MMHG | WEIGHT: 160 LBS | HEIGHT: 69 IN | HEART RATE: 72 BPM | DIASTOLIC BLOOD PRESSURE: 66 MMHG

## 2024-01-08 DIAGNOSIS — R79.1 ABNORMAL COAGULATION PROFILE: ICD-10-CM

## 2024-01-08 DIAGNOSIS — I50.9 CONGESTIVE HEART FAILURE, UNSPECIFIED HF CHRONICITY, UNSPECIFIED HEART FAILURE TYPE (MULTI): ICD-10-CM

## 2024-01-08 DIAGNOSIS — R94.31 ABNORMAL EKG: ICD-10-CM

## 2024-01-08 DIAGNOSIS — Z95.2 HISTORY OF AORTIC VALVE REPLACEMENT: Primary | ICD-10-CM

## 2024-01-08 DIAGNOSIS — I50.9 CONGESTIVE HEART FAILURE, UNSPECIFIED HF CHRONICITY, UNSPECIFIED HEART FAILURE TYPE (MULTI): Primary | ICD-10-CM

## 2024-01-08 DIAGNOSIS — Z79.01 LONG TERM (CURRENT) USE OF ANTICOAGULANTS: ICD-10-CM

## 2024-01-08 LAB
ANION GAP SERPL CALC-SCNC: 14 MMOL/L (ref 10–20)
BUN SERPL-MCNC: 29 MG/DL (ref 6–23)
CALCIUM SERPL-MCNC: 9 MG/DL (ref 8.6–10.6)
CHLORIDE SERPL-SCNC: 108 MMOL/L (ref 98–107)
CO2 SERPL-SCNC: 27 MMOL/L (ref 21–32)
CREAT SERPL-MCNC: 1.24 MG/DL (ref 0.5–1.3)
EGFRCR SERPLBLD CKD-EPI 2021: 56 ML/MIN/1.73M*2
GLUCOSE SERPL-MCNC: 72 MG/DL (ref 74–99)
INR PPP: 2.8 (ref 0.9–1.1)
POC INR: 3.1
POC PROTHROMBIN TIME: NORMAL
POTASSIUM SERPL-SCNC: 4.4 MMOL/L (ref 3.5–5.3)
PROTHROMBIN TIME: 31.9 SECONDS (ref 9.8–12.8)
SODIUM SERPL-SCNC: 145 MMOL/L (ref 136–145)

## 2024-01-08 PROCEDURE — 1111F DSCHRG MED/CURRENT MED MERGE: CPT | Performed by: NURSE PRACTITIONER

## 2024-01-08 PROCEDURE — 85610 PROTHROMBIN TIME: CPT

## 2024-01-08 PROCEDURE — 3078F DIAST BP <80 MM HG: CPT | Performed by: NURSE PRACTITIONER

## 2024-01-08 PROCEDURE — 1126F AMNT PAIN NOTED NONE PRSNT: CPT | Performed by: NURSE PRACTITIONER

## 2024-01-08 PROCEDURE — 80048 BASIC METABOLIC PNL TOTAL CA: CPT

## 2024-01-08 PROCEDURE — 99214 OFFICE O/P EST MOD 30 MIN: CPT | Performed by: NURSE PRACTITIONER

## 2024-01-08 PROCEDURE — 1036F TOBACCO NON-USER: CPT | Performed by: NURSE PRACTITIONER

## 2024-01-08 PROCEDURE — 3074F SYST BP LT 130 MM HG: CPT | Performed by: NURSE PRACTITIONER

## 2024-01-08 PROCEDURE — 99211 OFF/OP EST MAY X REQ PHY/QHP: CPT | Performed by: INTERNAL MEDICINE

## 2024-01-08 PROCEDURE — 36415 COLL VENOUS BLD VENIPUNCTURE: CPT

## 2024-01-08 PROCEDURE — 93005 ELECTROCARDIOGRAM TRACING: CPT

## 2024-01-08 PROCEDURE — 1159F MED LIST DOCD IN RCRD: CPT | Performed by: NURSE PRACTITIONER

## 2024-01-08 PROCEDURE — 93010 ELECTROCARDIOGRAM REPORT: CPT | Performed by: INTERNAL MEDICINE

## 2024-01-08 PROCEDURE — 1160F RVW MEDS BY RX/DR IN RCRD: CPT | Performed by: NURSE PRACTITIONER

## 2024-01-08 PROCEDURE — 85610 PROTHROMBIN TIME: CPT | Mod: QW

## 2024-01-08 ASSESSMENT — ENCOUNTER SYMPTOMS
DEPRESSION: 0
LOSS OF SENSATION IN FEET: 0
OCCASIONAL FEELINGS OF UNSTEADINESS: 1
CONSTITUTIONAL NEGATIVE: 1
GASTROINTESTINAL NEGATIVE: 1
MUSCULOSKELETAL NEGATIVE: 1
NEUROLOGICAL NEGATIVE: 1
CARDIOVASCULAR NEGATIVE: 1
RESPIRATORY NEGATIVE: 1

## 2024-01-08 ASSESSMENT — PAIN SCALES - GENERAL: PAINLEVEL: 0-NO PAIN

## 2024-01-08 NOTE — PROGRESS NOTES
Patient identification verified with 2 identifiers.    Location: [facility] - [address] [place] [id number] [phone]     Referring Physician: Dr. Anirudh Rehman  Enrollment/ Re-enrollment date: 5/22/2024   INR Goal: 2.5-3.5  INR monitoring is per [facility] protocol.  Anticoagulation Medication: warfarin  Indication: Aortic Valve Replacement    Subjective   Bleeding signs/symptoms: No    Bruising: No   Major bleeding event: No  Thrombosis signs/symptoms: No  Thromboembolic event: No  Missed doses: No  Extra doses: No  Medication changes: No  Dietary changes: No  Change in health: No  Change in activity: No  Alcohol: No  Other concerns: No    Upcoming Surgeries:  Does the Patient Have any upcoming surgeries that require interruption in anticoagulation therapy? no  Does the patient require bridging? no      Anticoagulation Summary  As of 1/8/2024      INR goal:  2.5-3.5   TTR:  49.2 % (2.9 mo)   INR used for dosing:  3.10 (1/8/2024)   Weekly warfarin total:  20 mg               Assessment/Plan   Therapeutic     1. New dose: no change    2. Next INR:  4 days      Education provided to patient during the visit:  Patient instructed to call in interim with questions, concerns and changes.   Patient educated on interactions between medications and warfarin.   Patient educated on dietary consistency in vitamin k consumption.   Patient educated on affects of alcohol consumption while taking warfarin.   Patient educated on signs of bleeding/clotting.   Patient educated on compliance with dosing, follow up appointments, and prescribed plan of care.
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
## 2024-01-09 DIAGNOSIS — I50.20 SYSTOLIC CONGESTIVE HEART FAILURE, UNSPECIFIED HF CHRONICITY (MULTI): Primary | ICD-10-CM

## 2024-01-10 LAB
ATRIAL RATE: 64 BPM
P AXIS: 94 DEGREES
P OFFSET: 168 MS
P ONSET: 102 MS
PR INTERVAL: 214 MS
Q ONSET: 209 MS
QRS COUNT: 11 BEATS
QRS DURATION: 160 MS
QT INTERVAL: 476 MS
QTC CALCULATION(BAZETT): 491 MS
QTC FREDERICIA: 486 MS
R AXIS: -56 DEGREES
T AXIS: 95 DEGREES
T OFFSET: 447 MS
VENTRICULAR RATE: 64 BPM

## 2024-01-11 ENCOUNTER — TELEPHONE (OUTPATIENT)
Dept: CARDIOLOGY | Facility: CLINIC | Age: 89
End: 2024-01-11
Payer: MEDICARE

## 2024-01-15 ENCOUNTER — OFFICE VISIT (OUTPATIENT)
Dept: CARDIOLOGY | Facility: CLINIC | Age: 89
End: 2024-01-15
Payer: MEDICARE

## 2024-01-15 ENCOUNTER — ANTICOAGULATION - WARFARIN VISIT (OUTPATIENT)
Dept: CARDIOLOGY | Facility: CLINIC | Age: 89
End: 2024-01-15
Payer: MEDICARE

## 2024-01-15 VITALS
SYSTOLIC BLOOD PRESSURE: 117 MMHG | OXYGEN SATURATION: 94 % | HEIGHT: 68 IN | HEART RATE: 75 BPM | BODY MASS INDEX: 23.48 KG/M2 | DIASTOLIC BLOOD PRESSURE: 71 MMHG | WEIGHT: 154.9 LBS

## 2024-01-15 DIAGNOSIS — Z79.01 LONG TERM (CURRENT) USE OF ANTICOAGULANTS: ICD-10-CM

## 2024-01-15 DIAGNOSIS — Z95.2 HISTORY OF AORTIC VALVE REPLACEMENT: Primary | ICD-10-CM

## 2024-01-15 DIAGNOSIS — I50.20 SYSTOLIC CONGESTIVE HEART FAILURE, UNSPECIFIED HF CHRONICITY (MULTI): Primary | ICD-10-CM

## 2024-01-15 LAB
POC INR: 6.1
POC PROTHROMBIN TIME: NORMAL

## 2024-01-15 PROCEDURE — 85610 PROTHROMBIN TIME: CPT | Mod: QW

## 2024-01-15 PROCEDURE — 99211 OFF/OP EST MAY X REQ PHY/QHP: CPT | Mod: 25 | Performed by: INTERNAL MEDICINE

## 2024-01-15 PROCEDURE — 99214 OFFICE O/P EST MOD 30 MIN: CPT | Performed by: NURSE PRACTITIONER

## 2024-01-15 PROCEDURE — 1036F TOBACCO NON-USER: CPT | Performed by: NURSE PRACTITIONER

## 2024-01-15 PROCEDURE — 1126F AMNT PAIN NOTED NONE PRSNT: CPT | Performed by: NURSE PRACTITIONER

## 2024-01-15 PROCEDURE — 1111F DSCHRG MED/CURRENT MED MERGE: CPT | Performed by: NURSE PRACTITIONER

## 2024-01-15 PROCEDURE — 3074F SYST BP LT 130 MM HG: CPT | Performed by: NURSE PRACTITIONER

## 2024-01-15 PROCEDURE — 3078F DIAST BP <80 MM HG: CPT | Performed by: NURSE PRACTITIONER

## 2024-01-15 PROCEDURE — 1159F MED LIST DOCD IN RCRD: CPT | Performed by: NURSE PRACTITIONER

## 2024-01-15 RX ORDER — AMOXICILLIN AND CLAVULANATE POTASSIUM 875; 125 MG/1; MG/1
1 TABLET, FILM COATED ORAL EVERY 12 HOURS
COMMUNITY
Start: 2024-01-11

## 2024-01-15 ASSESSMENT — ENCOUNTER SYMPTOMS
MUSCULOSKELETAL NEGATIVE: 1
CARDIOVASCULAR NEGATIVE: 1
RESPIRATORY NEGATIVE: 1
GASTROINTESTINAL NEGATIVE: 1
DIZZINESS: 1
CONSTITUTIONAL NEGATIVE: 1

## 2024-01-15 ASSESSMENT — COLUMBIA-SUICIDE SEVERITY RATING SCALE - C-SSRS
6. HAVE YOU EVER DONE ANYTHING, STARTED TO DO ANYTHING, OR PREPARED TO DO ANYTHING TO END YOUR LIFE?: NO
1. IN THE PAST MONTH, HAVE YOU WISHED YOU WERE DEAD OR WISHED YOU COULD GO TO SLEEP AND NOT WAKE UP?: NO
2. HAVE YOU ACTUALLY HAD ANY THOUGHTS OF KILLING YOURSELF?: NO

## 2024-01-15 ASSESSMENT — LIFESTYLE VARIABLES: HOW OFTEN DO YOU HAVE A DRINK CONTAINING ALCOHOL: NEVER

## 2024-01-15 ASSESSMENT — PAIN SCALES - GENERAL: PAINLEVEL: 0-NO PAIN
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
## 2024-01-15 NOTE — PROGRESS NOTES
Patient identification verified with 2 identifiers.    Location: [facility] - [address] [place], [place] [address] [phone]     Referring Physician: Dr. Anirudh Rehman  Enrollment/ Re-enrollment date: 2024   INR Goal: 2.5-3.5  INR monitoring is per [facility] protocol.  Anticoagulation Medication: warfarin  Indication: Aortic Valve Replacement    Subjective   Bleeding signs/symptoms: No    Bruising: No   Major bleeding event: No  Thrombosis signs/symptoms: No  Thromboembolic event: No  Missed doses: No  Extra doses: No  Medication changes: No  Dietary changes: No  Change in health: No  Change in activity: No  Alcohol: No  Other concerns: No    Upcoming Surgeries:  Does the Patient Have any upcoming surgeries that require interruption in anticoagulation therapy? no  Does the patient require bridging? no      Anticoagulation Summary  As of 1/15/2024      INR goal:  2.5-3.5   TTR:  46.6 % (3.2 mo)   INR used for dosin.10 (1/15/2024)   Weekly warfarin total:  20 mg               Assessment/Plan   Supratherapeutic     1. New dose:  Hold Warfarin 1/15 & .  RTC in 2 days.      2. Next INR:  2 days.       Education provided to patient during the visit:  Patient instructed to call in interim with questions, concerns and changes.   Patient educated on interactions between medications and warfarin.   Patient educated on dietary consistency in vitamin k consumption.   Patient educated on affects of alcohol consumption while taking warfarin.   Patient educated on signs of bleeding/clotting.   Patient educated on compliance with dosing, follow up appointments, and prescribed plan of care.

## 2024-01-17 ENCOUNTER — TELEPHONE (OUTPATIENT)
Dept: CARDIOLOGY | Facility: CLINIC | Age: 89
End: 2024-01-17

## 2024-01-17 ENCOUNTER — ANTICOAGULATION - WARFARIN VISIT (OUTPATIENT)
Dept: CARDIOLOGY | Facility: CLINIC | Age: 89
End: 2024-01-17
Payer: MEDICARE

## 2024-01-17 DIAGNOSIS — Z79.01 LONG TERM (CURRENT) USE OF ANTICOAGULANTS: ICD-10-CM

## 2024-01-17 DIAGNOSIS — I48.92 ATRIAL FIBRILLATION AND FLUTTER (MULTI): ICD-10-CM

## 2024-01-17 DIAGNOSIS — I48.91 ATRIAL FIBRILLATION AND FLUTTER (MULTI): ICD-10-CM

## 2024-01-17 DIAGNOSIS — Z95.2 HISTORY OF AORTIC VALVE REPLACEMENT: Primary | ICD-10-CM

## 2024-01-17 LAB
POC INR: 3.4
POC PROTHROMBIN TIME: NORMAL

## 2024-01-17 PROCEDURE — 85610 PROTHROMBIN TIME: CPT | Mod: QW

## 2024-01-17 PROCEDURE — 99211 OFF/OP EST MAY X REQ PHY/QHP: CPT | Performed by: INTERNAL MEDICINE

## 2024-01-17 RX ORDER — FUROSEMIDE 40 MG/1
40 TABLET ORAL DAILY
Qty: 90 TABLET | Refills: 1 | Status: SHIPPED | OUTPATIENT
Start: 2024-01-17 | End: 2024-04-24 | Stop reason: SDUPTHER

## 2024-01-17 RX ORDER — AMIODARONE HYDROCHLORIDE 200 MG/1
200 TABLET ORAL DAILY
Qty: 90 TABLET | Refills: 1 | Status: SHIPPED | OUTPATIENT
Start: 2024-01-17 | End: 2025-01-16

## 2024-01-17 NOTE — PROGRESS NOTES
Patient identification verified with 2 identifiers.    Location: [facility] - [address] [place] [id number] [phone]     Referring Physician: Dr. Anirudh Rehman  Enrollment/ Re-enrollment date: 5/22/2024   INR Goal: 2.5-3.5  INR monitoring is per [facility] protocol.  Anticoagulation Medication: warfarin  Indication: Aortic Valve Replacement    Subjective   Bleeding signs/symptoms: No    Bruising: No   Major bleeding event: No  Thrombosis signs/symptoms: No  Thromboembolic event: No  Missed doses: No  Extra doses: No  Medication changes: No  Dietary changes: No  Change in health: No  Change in activity: No  Alcohol: No  Other concerns: No    Upcoming Surgeries:  Does the Patient Have any upcoming surgeries that require interruption in anticoagulation therapy? no  Does the patient require bridging? no      Anticoagulation Summary  As of 1/17/2024      INR goal:  2.5-3.5   TTR:  45.7 % (3.2 mo)   INR used for dosing:  3.40 (1/17/2024)   Weekly warfarin total:  17.5 mg               Assessment/Plan   Therapeutic     1. New dose: no change    2. Next INR:  2 days      Education provided to patient during the visit:  Patient instructed to call in interim with questions, concerns and changes.   Patient educated on interactions between medications and warfarin.   Patient educated on dietary consistency in vitamin k consumption.   Patient educated on affects of alcohol consumption while taking warfarin.   Patient educated on signs of bleeding/clotting.   Patient educated on compliance with dosing, follow up appointments, and prescribed plan of care.

## 2024-01-19 ENCOUNTER — TELEPHONE (OUTPATIENT)
Dept: CARDIOLOGY | Facility: CLINIC | Age: 89
End: 2024-01-19

## 2024-01-19 ENCOUNTER — ANTICOAGULATION - WARFARIN VISIT (OUTPATIENT)
Dept: CARDIOLOGY | Facility: CLINIC | Age: 89
End: 2024-01-19
Payer: MEDICARE

## 2024-01-19 DIAGNOSIS — Z79.01 LONG TERM (CURRENT) USE OF ANTICOAGULANTS: ICD-10-CM

## 2024-01-19 DIAGNOSIS — Z95.2 HISTORY OF AORTIC VALVE REPLACEMENT: Primary | ICD-10-CM

## 2024-01-19 LAB
POC INR: 3.3
POC PROTHROMBIN TIME: NORMAL

## 2024-01-19 PROCEDURE — 99211 OFF/OP EST MAY X REQ PHY/QHP: CPT | Performed by: INTERNAL MEDICINE

## 2024-01-19 PROCEDURE — 85610 PROTHROMBIN TIME: CPT | Mod: QW

## 2024-01-19 NOTE — PROGRESS NOTES
Patient identification verified with 2 identifiers.    Location: [facility] - [address] [place], [place] [address] [phone]     Referring Physician: Dr. Anirudh Rehman  Enrollment/ Re-enrollment date: 5/22/2024   INR Goal: 2.5-3.5  INR monitoring is per [facility] protocol.  Anticoagulation Medication: warfarin  Indication: Aortic Valve Replacement    Subjective   Bleeding signs/symptoms: No    Bruising: No   Major bleeding event: No  Thrombosis signs/symptoms: No  Thromboembolic event: No  Missed doses: No  Extra doses: No  Medication changes: No  Dietary changes: No  Change in health: No  Change in activity: No  Alcohol: No  Other concerns: No    Upcoming Surgeries:  Does the Patient Have any upcoming surgeries that require interruption in anticoagulation therapy? no  Does the patient require bridging? no      Anticoagulation Summary  As of 1/19/2024      INR goal:  2.5-3.5   TTR:  46.9 % (3.3 mo)   INR used for dosing:  3.30 (1/19/2024)   Weekly warfarin total:  17.5 mg               Assessment/Plan   Therapeutic     1. New dose: no change    2. Next INR:  10 days.       Education provided to patient during the visit:  Patient instructed to call in interim with questions, concerns and changes.   Patient educated on interactions between medications and warfarin.   Patient educated on dietary consistency in vitamin k consumption.   Patient educated on affects of alcohol consumption while taking warfarin.   Patient educated on signs of bleeding/clotting.   Patient educated on compliance with dosing, follow up appointments, and prescribed plan of care.

## 2024-01-19 NOTE — TELEPHONE ENCOUNTER
Pt will be leaving for [place] Sunday and return 3/20.  He will have INRs done in [place] and results sent here.  Next INR due 1/29/24.

## 2024-01-29 ENCOUNTER — PATIENT OUTREACH (OUTPATIENT)
Dept: CASE MANAGEMENT | Facility: HOSPITAL | Age: 89
End: 2024-01-29
Payer: MEDICARE

## 2024-01-29 LAB
INR IN PPP BY COAGULATION ASSAY EXTERNAL: 4.6
PROTHROMBIN TIME (PT) IN PPP BY COAGULATION ASSAY EXTERNAL: NORMAL SECONDS

## 2024-01-29 NOTE — PROGRESS NOTES
Follow up phone call made by CHF Clinical Nurse Navigator. Called-No answer, left message. Closing HF case today. Nick has not answered or returned any HF follow up calls in past 30 days.

## 2024-01-31 NOTE — TELEPHONE ENCOUNTER
Pt left  stating he had lab drawn on 1/29.  This clinic has not received any results.  Called pt, went to  but unable to leave message as VM full.  Will try later.

## 2024-01-31 NOTE — TELEPHONE ENCOUNTER
Tried several times during day to reach pt.  Each time, phone goes directly to VM and unable to leave message cue to VM being full.

## 2024-02-02 ENCOUNTER — ANTICOAGULATION - WARFARIN VISIT (OUTPATIENT)
Dept: CARDIOLOGY | Facility: CLINIC | Age: 89
End: 2024-02-02
Payer: MEDICARE

## 2024-02-02 DIAGNOSIS — Z95.2 HISTORY OF AORTIC VALVE REPLACEMENT: Primary | ICD-10-CM

## 2024-02-02 DIAGNOSIS — Z79.01 LONG TERM (CURRENT) USE OF ANTICOAGULANTS: ICD-10-CM

## 2024-02-02 NOTE — PROGRESS NOTES
Patient identification verified with 2 identifiers.    Location: [facility] - [address] [place] [id number] [phone]     Referring Physician: Dr. Anirudh Rehman  Enrollment/ Re-enrollment date: 2024   INR Goal: 2.5-3.5  [facility]  Anticoagulation Medication: warfarin  Indication: Aortic Valve Replacement    Subjective   Bleeding signs/symptoms: No    Bruising: No   Major bleeding event: No  Thrombosis signs/symptoms: No  Thromboembolic event: No  Missed doses: No  Extra doses: No  Medication changes: No  Dietary changes: No  Change in health: No  Change in activity: No  Alcohol: No  Other concerns: No    Upcoming Procedures:  Does the Patient Have any upcoming procedures that require interruption in anticoagulation therapy? no  Does the patient require bridging? no      Anticoagulation Summary  As of 2024      INR goal:  2.5-3.5   TTR:  44.2 % (3.6 mo)   INR used for dosin.60 (2024)   Weekly warfarin total:  17.5 mg               Assessment/Plan   Supratherapeutic     1. New dose:  Hold Warfarin 2/2 & 2/3.     2. Next INR:  3 days      Education provided to patient during the visit:  Patient instructed to call in interim with questions, concerns and changes.   Patient educated on interactions between medications and warfarin.   Patient educated on dietary consistency in vitamin k consumption.   Patient educated on affects of alcohol consumption while taking warfarin.   Patient educated on signs of bleeding/clotting.   Patient educated on compliance with dosing, follow up appointments, and prescribed plan of care.

## 2024-02-02 NOTE — TELEPHONE ENCOUNTER
Received VM from daughter Karen Severino ([phone]) stating pt has been waiting for a call from clinic with INR results.  I called Karen back and explained I have been trying to reach pt but calls go directly to  and VM is full and I am unable to leave message.  I asked if she could call back with alternate contact number.

## 2024-02-02 NOTE — TELEPHONE ENCOUNTER
Spoke with daughter Karen.  She will contact pt via alternate method and relay instructions for dosing.  She is going to visit her parents tomorrow and will check their cell phone. Next INR 2/5/24.

## 2024-02-05 ENCOUNTER — ANTICOAGULATION - OTHER VISIT (DOAC) (OUTPATIENT)
Dept: CARDIOLOGY | Facility: CLINIC | Age: 89
End: 2024-02-05
Payer: MEDICARE

## 2024-02-05 DIAGNOSIS — Z95.2 HISTORY OF AORTIC VALVE REPLACEMENT: Primary | ICD-10-CM

## 2024-02-05 DIAGNOSIS — Z79.01 LONG TERM (CURRENT) USE OF ANTICOAGULANTS: ICD-10-CM

## 2024-02-05 LAB
INR IN PPP BY COAGULATION ASSAY EXTERNAL: 2.1
PROTHROMBIN TIME (PT) IN PPP BY COAGULATION ASSAY EXTERNAL: NORMAL SECONDS
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
## 2024-02-05 NOTE — TELEPHONE ENCOUNTER
Called and spoke with daughter Karen.  Reviewed INR results and instructions for dosing and next INR check.  Karen voices an understanding.  She states she will not be with pt next week.

## 2024-02-14 ENCOUNTER — TELEPHONE (OUTPATIENT)
Dept: CARDIOLOGY | Facility: CLINIC | Age: 89
End: 2024-02-14
Payer: MEDICARE

## 2024-02-14 NOTE — TELEPHONE ENCOUNTER
Received VM from pt's daughter Karen requesting call back related to coumadin clinic.  Karen stated pt had lab draw yesterday and was awaiting instructions.  Returned call and spoke with pt.  He does not know the result of yesterday's lab draw.  Information is not found in [facility] [employer] everywhere.  Pt states he will call the hospital for his result.  Pt states he has been on coumadin a long time and can adjust his dosing if needed.  This nurse instructed pt that he will need to call back to coumadin clinic for dosing instructions.

## 2024-02-19 LAB
INR IN PPP BY COAGULATION ASSAY EXTERNAL: 3.6
PROTHROMBIN TIME (PT) IN PPP BY COAGULATION ASSAY EXTERNAL: NORMAL SECONDS

## 2024-02-19 NOTE — TELEPHONE ENCOUNTER
Called and spoke with pt today.  He had INR done this morning but it is not resulted yet in Epic.  INR on 2/14 was 3.3 which is therapeutic.  Will monitor for result and contact pt with further instructions.

## 2024-02-21 ENCOUNTER — ANTICOAGULATION - WARFARIN VISIT (OUTPATIENT)
Dept: CARDIOLOGY | Facility: CLINIC | Age: 89
End: 2024-02-21
Payer: MEDICARE

## 2024-02-21 DIAGNOSIS — Z79.01 LONG TERM (CURRENT) USE OF ANTICOAGULANTS: ICD-10-CM

## 2024-02-21 DIAGNOSIS — Z95.2 HISTORY OF AORTIC VALVE REPLACEMENT: Primary | ICD-10-CM

## 2024-02-21 NOTE — PROGRESS NOTES
Patient identification verified with 2 identifiers.    Location: [facility] - [address] [place] [id number] [phone]     Referring Physician: Dr. Anirudh Rehman  Enrollment/ Re-enrollment date: 5/22/2024   INR Goal: 2.5-3.5  INR monitoring is per [facility] protocol.  Anticoagulation Medication: warfarin  Indication: Aortic Valve Replacement    Subjective   Bleeding signs/symptoms: No    Bruising: No   Major bleeding event: No  Thrombosis signs/symptoms: No  Thromboembolic event: No  Missed doses: No  Extra doses: No  Medication changes: No  Dietary changes: No  Change in health: No  Change in activity: No  Alcohol: No  Other concerns: No    Upcoming Procedures:  Does the Patient Have any upcoming procedures that require interruption in anticoagulation therapy? no  Does the patient require bridging? no      Anticoagulation Summary  As of 2/21/2024      INR goal:  2.5-3.5   TTR:  46.3 % (4.3 mo)   INR used for dosing:  3.60 (2/19/2024)   Weekly warfarin total:  17.5 mg               Assessment/Plan   Supratherapeutic     1. New dose: no change    2. Next INR: 1 week      Education provided to patient during the visit:  Patient instructed to call in interim with questions, concerns and changes.   Patient educated on interactions between medications and warfarin.   Patient educated on dietary consistency in vitamin k consumption.   Patient educated on affects of alcohol consumption while taking warfarin.   Patient educated on signs of bleeding/clotting.   Patient educated on compliance with dosing, follow up appointments, and prescribed plan of care.

## 2024-02-26 ENCOUNTER — ANTICOAGULATION - WARFARIN VISIT (OUTPATIENT)
Dept: CARDIOLOGY | Facility: CLINIC | Age: 89
End: 2024-02-26
Payer: MEDICARE

## 2024-02-26 DIAGNOSIS — Z79.01 LONG TERM (CURRENT) USE OF ANTICOAGULANTS: ICD-10-CM

## 2024-02-26 DIAGNOSIS — Z95.2 HISTORY OF AORTIC VALVE REPLACEMENT: Primary | ICD-10-CM

## 2024-02-26 LAB
INR IN PPP BY COAGULATION ASSAY EXTERNAL: 3.2
PROTHROMBIN TIME (PT) IN PPP BY COAGULATION ASSAY EXTERNAL: NORMAL SECONDS
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
## 2024-02-26 NOTE — TELEPHONE ENCOUNTER
INR 3.2 from lab draw today, 2/26.  Called and spoke with pt.  See chart for full note.  Next INR due 3/11/23.

## 2024-03-11 LAB
INR IN PPP BY COAGULATION ASSAY EXTERNAL: 3.8
PROTHROMBIN TIME (PT) IN PPP BY COAGULATION ASSAY EXTERNAL: NORMAL SECONDS

## 2024-03-15 ENCOUNTER — ANTICOAGULATION - WARFARIN VISIT (OUTPATIENT)
Dept: CARDIOLOGY | Facility: CLINIC | Age: 89
End: 2024-03-15
Payer: MEDICARE

## 2024-03-15 DIAGNOSIS — Z79.01 LONG TERM (CURRENT) USE OF ANTICOAGULANTS: ICD-10-CM

## 2024-03-15 DIAGNOSIS — Z95.2 HISTORY OF AORTIC VALVE REPLACEMENT: Primary | ICD-10-CM

## 2024-03-15 NOTE — PROGRESS NOTES
Patient identification verified with 2 identifiers.    Location: [facility] - [address] [place], [place] [address] [phone]     Referring Physician: Dr. Rehman  Enrollment/ Re-enrollment date: 5/22/24   INR Goal: 2.5-3.5  INR monitoring is per [facility] protocol.  Anticoagulation Medication: warfarin  Indication: Aortic Valve Replacement    Subjective   Bleeding signs/symptoms: No    Bruising: No   Major bleeding event: No  Thrombosis signs/symptoms: No  Thromboembolic event: No  Missed doses: No  Extra doses: No  Medication changes: No  Dietary changes: Yes  Pt states he has been eating 1/2 an orange daily.  Change in health: No  Change in activity: No  Alcohol: No  Other concerns: No    Upcoming Procedures:  Does the Patient Have any upcoming procedures that require interruption in anticoagulation therapy? no  Does the patient require bridging? no      Anticoagulation Summary  As of 3/15/2024      INR goal:  2.5-3.5   TTR:  48.0 % (5 mo)   INR used for dosing:  3.80 (3/11/2024)   Weekly warfarin total:  17.5 mg               Assessment/Plan   Supratherapeutic     1. New dose: no change    2. Next INR:  5 days      Education provided to patient during the visit:  Patient instructed to call in interim with questions, concerns and changes.   Patient educated on interactions between medications and warfarin.   Patient educated on dietary consistency in vitamin k consumption.   Patient educated on affects of alcohol consumption while taking warfarin.   Patient educated on signs of bleeding/clotting.   Patient educated on compliance with dosing, follow up appointments, and prescribed plan of care.

## 2024-03-15 NOTE — TELEPHONE ENCOUNTER
Called and spoke with pt regarding INR from 311.  See Epic for full note.  Pt will be returning from [place] on 3/18.  POCT scheduled for 3/20.

## 2024-03-20 ENCOUNTER — ANTICOAGULATION - WARFARIN VISIT (OUTPATIENT)
Dept: CARDIOLOGY | Facility: CLINIC | Age: 89
End: 2024-03-20
Payer: MEDICARE

## 2024-03-20 DIAGNOSIS — I48.91 ATRIAL FIBRILLATION, UNSPECIFIED TYPE (MULTI): Primary | ICD-10-CM

## 2024-03-20 DIAGNOSIS — Z95.2 HISTORY OF AORTIC VALVE REPLACEMENT: Primary | ICD-10-CM

## 2024-03-20 DIAGNOSIS — Z79.01 LONG TERM (CURRENT) USE OF ANTICOAGULANTS: ICD-10-CM

## 2024-03-20 LAB
POC INR: 4 (ref 2.5–3.5)
POC PROTHROMBIN TIME: ABNORMAL

## 2024-03-20 PROCEDURE — 85610 PROTHROMBIN TIME: CPT | Mod: QW

## 2024-03-20 RX ORDER — WARFARIN 2 MG/1
2 TABLET ORAL SEE ADMIN INSTRUCTIONS
Qty: 60 TABLET | Refills: 3 | Status: SHIPPED | OUTPATIENT
Start: 2024-03-20 | End: 2025-03-20

## 2024-03-20 NOTE — PROGRESS NOTES
Patient identification verified with 2 identifiers.    Location: [facility] - [address] [place], [place] [address] [phone]     Referring Physician: Dr. Rehman  Enrollment/ Re-enrollment date: 24   INR Goal: 2.5-3.5  INR monitoring is per [facility] protocol.  Anticoagulation Medication: warfarin  Indication: Aortic Valve Replacement    Subjective   Bleeding signs/symptoms: No    Bruising: No   Major bleeding event: No  Thrombosis signs/symptoms: No  Thromboembolic event: No  Missed doses: No  Extra doses: No  Medication changes: Yes  Dietary changes: Yes  Change in health: No  Change in activity: No  Alcohol: No  Other concerns: No    Upcoming Procedures:  Does the Patient Have any upcoming procedures that require interruption in anticoagulation therapy? no  Does the patient require bridging? no      Anticoagulation Summary  As of 3/20/2024      INR goal:  2.5-3.5   TTR:  45.3 % (5.3 mo)   INR used for dosin.00 (3/20/2024)   Weekly warfarin total:  17.5 mg               Assessment/Plan   Supratherapeutic  notified of elevated INR and plan via secure chat.    1. New dose:  Hold x 2 days 3/20 and 3/21.  Contnue with 1/2 tablet (2.5mg daily) through the weekend.     2. Next INR:  5 days      Education provided to patient during the visit:  Patient instructed to call in interim with questions, concerns and changes.   Patient educated on interactions between medications and warfarin.   Patient educated on dietary consistency in vitamin k consumption.   Patient educated on affects of alcohol consumption while taking warfarin.   Patient educated on signs of bleeding/clotting.   Patient educated on compliance with dosing, follow up appointments, and prescribed plan of care.

## 2024-03-25 ENCOUNTER — ANTICOAGULATION - WARFARIN VISIT (OUTPATIENT)
Dept: CARDIOLOGY | Facility: CLINIC | Age: 89
End: 2024-03-25
Payer: MEDICARE

## 2024-03-25 DIAGNOSIS — Z95.2 HISTORY OF AORTIC VALVE REPLACEMENT: Primary | ICD-10-CM

## 2024-03-25 DIAGNOSIS — Z79.01 LONG TERM (CURRENT) USE OF ANTICOAGULANTS: ICD-10-CM

## 2024-03-25 LAB
POC INR: 3.7
POC PROTHROMBIN TIME: NORMAL

## 2024-03-25 PROCEDURE — 85610 PROTHROMBIN TIME: CPT | Mod: QW

## 2024-03-25 PROCEDURE — 99211 OFF/OP EST MAY X REQ PHY/QHP: CPT

## 2024-03-25 NOTE — PROGRESS NOTES
Patient identification verified with 2 identifiers.    Location: [facility] - [address] [place], [place] [address] [phone]     Referring Physician: Dr. Anirudh Rehman  Enrollment/ Re-enrollment date: 5/22/2024   INR Goal: 2.5-3.5  INR monitoring is per [facility] protocol.  Anticoagulation Medication: warfarin  Indication: Aortic Valve Replacement    Subjective   Bleeding signs/symptoms: No    Bruising: No   Major bleeding event: No  Thrombosis signs/symptoms: No  Thromboembolic event: No  Missed doses: No  Extra doses: No  Medication changes: No  Dietary changes: No  Change in health: No  Change in activity: No  Alcohol: No  Other concerns: No    Upcoming Procedures:  Does the Patient Have any upcoming procedures that require interruption in anticoagulation therapy? no  Does the patient require bridging? no      Anticoagulation Summary  As of 3/25/2024      INR goal:  2.5-3.5   TTR:  43.8 % (5.5 mo)   INR used for dosing:  3.70 (3/25/2024)   Weekly warfarin total:  17 mg               Assessment/Plan   Supratherapeutic     1. New dose:  Reduce TWD 5% and begin using 2mg tablets also.      2. Next INR: 1 week      Education provided to patient during the visit:  Patient instructed to call in interim with questions, concerns and changes.   Patient educated on interactions between medications and warfarin.   Patient educated on dietary consistency in vitamin k consumption.   Patient educated on affects of alcohol consumption while taking warfarin.   Patient educated on signs of bleeding/clotting.   Patient educated on compliance with dosing, follow up appointments, and prescribed plan of care.

## 2024-04-03 ENCOUNTER — APPOINTMENT (OUTPATIENT)
Dept: CARDIOLOGY | Facility: CLINIC | Age: 89
End: 2024-04-03
Payer: MEDICARE

## 2024-04-03 ENCOUNTER — ANTICOAGULATION - WARFARIN VISIT (OUTPATIENT)
Dept: CARDIOLOGY | Facility: CLINIC | Age: 89
End: 2024-04-03
Payer: MEDICARE

## 2024-04-03 DIAGNOSIS — Z95.2 HISTORY OF AORTIC VALVE REPLACEMENT: Primary | ICD-10-CM

## 2024-04-03 DIAGNOSIS — Z79.01 LONG TERM (CURRENT) USE OF ANTICOAGULANTS: ICD-10-CM

## 2024-04-03 LAB
POC INR: 2.6 (ref 2.5–3.5)
POC PROTHROMBIN TIME: NORMAL

## 2024-04-03 PROCEDURE — 99211 OFF/OP EST MAY X REQ PHY/QHP: CPT

## 2024-04-03 PROCEDURE — 85610 PROTHROMBIN TIME: CPT | Mod: QW

## 2024-04-03 NOTE — PROGRESS NOTES
Patient identification verified with 2 identifiers.    Location: [facility] - [address] [place] [id number] [phone]     Referring Physician: Dr. Anirudh Rehman  Enrollment/ Re-enrollment date: 2024   INR Goal: 2.5-3.5  INR monitoring is per [facility] protocol.  Anticoagulation Medication: warfarin  Indication: Aortic Valve Replacement    Subjective   Bleeding signs/symptoms: No    Bruising: No   Major bleeding event: No  Thrombosis signs/symptoms: No  Thromboembolic event: No  Missed doses: No  Extra doses: No  Medication changes: No  Dietary changes: No  Change in health: No  Change in activity: No  Alcohol: No  Other concerns: No    Upcoming Procedures:  Does the Patient Have any upcoming procedures that require interruption in anticoagulation therapy? no  Does the patient require bridging? no      Anticoagulation Summary  As of 4/3/2024      INR goal:  2.5-3.5   TTR:  45.7 % (5.8 mo)   INR used for dosin.60 (4/3/2024)   Weekly warfarin total:  17 mg               Assessment/Plan   Therapeutic     1. New dose: no change    2. Next INR: 1 week      Education provided to patient during the visit:  Patient instructed to call in interim with questions, concerns and changes.   Patient educated on interactions between medications and warfarin.   Patient educated on dietary consistency in vitamin k consumption.   Patient educated on affects of alcohol consumption while taking warfarin.   Patient educated on signs of bleeding/clotting.   Patient educated on compliance with dosing, follow up appointments, and prescribed plan of care.

## 2024-04-10 ENCOUNTER — APPOINTMENT (OUTPATIENT)
Dept: CARDIOLOGY | Facility: CLINIC | Age: 89
End: 2024-04-10
Payer: MEDICARE

## 2024-04-12 ENCOUNTER — ANTICOAGULATION - WARFARIN VISIT (OUTPATIENT)
Dept: CARDIOLOGY | Facility: CLINIC | Age: 89
End: 2024-04-12
Payer: MEDICARE

## 2024-04-12 DIAGNOSIS — Z95.2 HISTORY OF AORTIC VALVE REPLACEMENT: Primary | ICD-10-CM

## 2024-04-12 DIAGNOSIS — Z79.01 LONG TERM (CURRENT) USE OF ANTICOAGULANTS: ICD-10-CM

## 2024-04-12 LAB
POC INR: 3.8
POC PROTHROMBIN TIME: NORMAL

## 2024-04-12 PROCEDURE — 85610 PROTHROMBIN TIME: CPT | Mod: QW

## 2024-04-12 PROCEDURE — 99211 OFF/OP EST MAY X REQ PHY/QHP: CPT

## 2024-04-12 NOTE — PROGRESS NOTES
Patient identification verified with 2 identifiers.    Location: [facility] - [address] [place] [id number] [phone]     Referring Physician: Dr. Anirudh Rehman  Enrollment/ Re-enrollment date: 5/22/2024   INR Goal: 2.5-3.5  INR monitoring is per [facility] protocol.  Anticoagulation Medication: warfarin  Indication: Aortic Valve Replacement    Subjective   Bleeding signs/symptoms: No    Bruising: No   Major bleeding event: No  Thrombosis signs/symptoms: No  Thromboembolic event: No  Missed doses: No  Extra doses: No  Medication changes: No  Dietary changes: No  Change in health: No  Change in activity: No  Alcohol: No  Other concerns: No    Upcoming Procedures:  Does the Patient Have any upcoming procedures that require interruption in anticoagulation therapy? no  Does the patient require bridging? no      Anticoagulation Summary  As of 4/12/2024      INR goal:  2.5-3.5   TTR:  45.7% (5.8 mo)   INR used for dosing:     Weekly warfarin total:  17 mg               Assessment/Plan   Supratherapeutic     1. New dose:  Reduce TWD 5%.     2. Next INR: 1 week      Education provided to patient during the visit:  Patient instructed to call in interim with questions, concerns and changes.   Patient educated on interactions between medications and warfarin.   Patient educated on dietary consistency in vitamin k consumption.   Patient educated on affects of alcohol consumption while taking warfarin.   Patient educated on signs of bleeding/clotting.   Patient educated on compliance with dosing, follow up appointments, and prescribed plan of care.

## 2024-04-17 PROBLEM — Z79.01 LONG TERM CURRENT USE OF ANTICOAGULANT THERAPY: Status: ACTIVE | Noted: 2023-09-25

## 2024-04-17 PROBLEM — H44.50 DEGENERATIVE DISORDER OF EYE: Status: ACTIVE | Noted: 2023-09-02

## 2024-04-17 PROBLEM — R55 SYNCOPE: Status: ACTIVE | Noted: 2024-04-17

## 2024-04-17 PROBLEM — R51.9 HEADACHE: Status: ACTIVE | Noted: 2024-04-17

## 2024-04-17 PROBLEM — J10.1 INFLUENZA DUE TO INFLUENZA A VIRUS: Status: ACTIVE | Noted: 2024-04-17

## 2024-04-17 PROBLEM — Z86.73 HISTORY OF STROKE WITHOUT RESIDUAL DEFICITS: Status: ACTIVE | Noted: 2024-04-17

## 2024-04-17 PROBLEM — I73.9 PERIPHERAL VASCULAR DISEASE (CMS-HCC): Status: ACTIVE | Noted: 2024-04-17

## 2024-04-17 PROBLEM — H43.819 POSTERIOR VITREOUS DETACHMENT: Status: ACTIVE | Noted: 2024-04-17

## 2024-04-17 PROBLEM — Z95.2 MECHANICAL HEART VALVE PRESENT: Status: ACTIVE | Noted: 2024-04-17

## 2024-04-17 PROBLEM — R94.31 ABNORMAL ELECTROCARDIOGRAPHY: Status: ACTIVE | Noted: 2024-04-17

## 2024-04-17 PROBLEM — J11.1 INFLUENZA-LIKE ILLNESS: Status: ACTIVE | Noted: 2024-04-17

## 2024-04-17 PROBLEM — Z86.69 HISTORY OF CATARACT: Status: ACTIVE | Noted: 2024-04-17

## 2024-04-17 PROBLEM — B35.1 TINEA UNGUIUM: Status: ACTIVE | Noted: 2024-04-17

## 2024-04-17 PROBLEM — Z86.79 H/O HEART DISORDER: Status: ACTIVE | Noted: 2024-04-17

## 2024-04-17 PROBLEM — R50.9 FEVER: Status: ACTIVE | Noted: 2024-04-17

## 2024-04-17 PROBLEM — T82.09XA: Status: ACTIVE | Noted: 2023-10-13

## 2024-04-19 ENCOUNTER — OFFICE VISIT (OUTPATIENT)
Dept: CARDIOLOGY | Facility: CLINIC | Age: 89
End: 2024-04-19
Payer: MEDICARE

## 2024-04-19 ENCOUNTER — ANTICOAGULATION - WARFARIN VISIT (OUTPATIENT)
Dept: CARDIOLOGY | Facility: CLINIC | Age: 89
End: 2024-04-19
Payer: MEDICARE

## 2024-04-19 ENCOUNTER — HOSPITAL ENCOUNTER (OUTPATIENT)
Dept: CARDIOLOGY | Facility: CLINIC | Age: 89
Discharge: HOME | End: 2024-04-19
Payer: MEDICARE

## 2024-04-19 VITALS
OXYGEN SATURATION: 95 % | BODY MASS INDEX: 23.64 KG/M2 | DIASTOLIC BLOOD PRESSURE: 60 MMHG | HEART RATE: 81 BPM | WEIGHT: 156 LBS | HEIGHT: 68 IN | SYSTOLIC BLOOD PRESSURE: 123 MMHG

## 2024-04-19 DIAGNOSIS — I48.92 ATRIAL FIBRILLATION AND FLUTTER (MULTI): ICD-10-CM

## 2024-04-19 DIAGNOSIS — Z95.3 FOLLOW-UP VISIT FOR AORTIC VALVE REPLACEMENT WITH BIOPROSTHETIC VALVE: ICD-10-CM

## 2024-04-19 DIAGNOSIS — Z09 FOLLOW-UP VISIT FOR AORTIC VALVE REPLACEMENT WITH BIOPROSTHETIC VALVE: ICD-10-CM

## 2024-04-19 DIAGNOSIS — I48.91 ATRIAL FIBRILLATION AND FLUTTER (MULTI): ICD-10-CM

## 2024-04-19 DIAGNOSIS — Z95.2 HISTORY OF AORTIC VALVE REPLACEMENT: Primary | ICD-10-CM

## 2024-04-19 DIAGNOSIS — I35.8 OTHER NONRHEUMATIC AORTIC VALVE DISORDERS: ICD-10-CM

## 2024-04-19 DIAGNOSIS — T82.897D REGURGITATION OF PROSTHETIC VALVE, SUBSEQUENT ENCOUNTER: ICD-10-CM

## 2024-04-19 DIAGNOSIS — Z95.2 PRESENCE OF PROSTHETIC HEART VALVE: ICD-10-CM

## 2024-04-19 DIAGNOSIS — R01.1 CARDIAC MURMUR, UNSPECIFIED: ICD-10-CM

## 2024-04-19 DIAGNOSIS — Z79.01 LONG TERM (CURRENT) USE OF ANTICOAGULANTS: ICD-10-CM

## 2024-04-19 LAB
POC INR: 2.5
POC PROTHROMBIN TIME: NORMAL

## 2024-04-19 PROCEDURE — 1160F RVW MEDS BY RX/DR IN RCRD: CPT | Performed by: INTERNAL MEDICINE

## 2024-04-19 PROCEDURE — 1126F AMNT PAIN NOTED NONE PRSNT: CPT | Performed by: INTERNAL MEDICINE

## 2024-04-19 PROCEDURE — 99214 OFFICE O/P EST MOD 30 MIN: CPT | Performed by: INTERNAL MEDICINE

## 2024-04-19 PROCEDURE — 1159F MED LIST DOCD IN RCRD: CPT | Performed by: INTERNAL MEDICINE

## 2024-04-19 PROCEDURE — 93306 TTE W/DOPPLER COMPLETE: CPT | Performed by: INTERNAL MEDICINE

## 2024-04-19 PROCEDURE — 3074F SYST BP LT 130 MM HG: CPT | Performed by: INTERNAL MEDICINE

## 2024-04-19 PROCEDURE — 3078F DIAST BP <80 MM HG: CPT | Performed by: INTERNAL MEDICINE

## 2024-04-19 PROCEDURE — 93306 TTE W/DOPPLER COMPLETE: CPT

## 2024-04-19 PROCEDURE — 85610 PROTHROMBIN TIME: CPT | Mod: QW

## 2024-04-19 PROCEDURE — 99211 OFF/OP EST MAY X REQ PHY/QHP: CPT

## 2024-04-19 PROCEDURE — 1157F ADVNC CARE PLAN IN RCRD: CPT | Performed by: INTERNAL MEDICINE

## 2024-04-19 ASSESSMENT — PATIENT HEALTH QUESTIONNAIRE - PHQ9
SUM OF ALL RESPONSES TO PHQ9 QUESTIONS 1 AND 2: 0
2. FEELING DOWN, DEPRESSED OR HOPELESS: NOT AT ALL
1. LITTLE INTEREST OR PLEASURE IN DOING THINGS: NOT AT ALL

## 2024-04-19 ASSESSMENT — PAIN SCALES - GENERAL: PAINLEVEL: 0-NO PAIN

## 2024-04-19 NOTE — PROGRESS NOTES
Patient identification verified with 2 identifiers.    Location: [facility] - [address] [place], [place] [address] [phone]     Referring Physician: Dr. Anirudh Rehman  Enrollment/ Re-enrollment date: 2024   INR Goal: 2.5-3.5  INR monitoring is per [facility] protocol.  Anticoagulation Medication: warfarin  Indication: Aortic Valve Replacement    Subjective   Bleeding signs/symptoms: No    Bruising: No   Major bleeding event: No  Thrombosis signs/symptoms: No  Thromboembolic event: No  Missed doses: Yes  Missed last nights dose.  Extra doses: No  Medication changes: No  Dietary changes: No  Change in health: No  Change in activity: No  Alcohol: No  Other concerns: No    Upcoming Procedures:  Does the Patient Have any upcoming procedures that require interruption in anticoagulation therapy? no  Does the patient require bridging? no      Anticoagulation Summary  As of 2024      INR goal:  2.5-3.5   TTR:  48.2% (6.3 mo)   INR used for dosin.50 (2024)   Weekly warfarin total:  16.5 mg               Assessment/Plan   Therapeutic     1. New dose: no change  Will give extra 1mg dose since pt missed last evening and is at low end of range.  2. Next INR: 1 week      Education provided to patient during the visit:  Patient instructed to call in interim with questions, concerns and changes.   Patient educated on interactions between medications and warfarin.   Patient educated on dietary consistency in vitamin k consumption.   Patient educated on affects of alcohol consumption while taking warfarin.   Patient educated on signs of bleeding/clotting.   Patient educated on compliance with dosing, follow up appointments, and prescribed plan of care.

## 2024-04-21 LAB
AORTIC VALVE MEAN GRADIENT: 12.4 MMHG
AORTIC VALVE PEAK VELOCITY: 2.37 M/S
AV PEAK GRADIENT: 22.4 MMHG
AVA (PEAK VEL): 0.95 CM2
AVA (VTI): 1.07 CM2
EJECTION FRACTION APICAL 4 CHAMBER: 37.2
LEFT ATRIUM VOLUME AREA LENGTH INDEX BSA: 53 ML/M2
LEFT VENTRICLE INTERNAL DIMENSION DIASTOLE: 5.86 CM (ref 3.5–6)
LEFT VENTRICULAR OUTFLOW TRACT DIAMETER: 1.82 CM
LV EJECTION FRACTION BIPLANE: 38 %
MITRAL VALVE E/A RATIO: 1.4
MITRAL VALVE E/E' RATIO: 25.19
RIGHT VENTRICLE FREE WALL PEAK S': 10 CM/S
RIGHT VENTRICLE PEAK SYSTOLIC PRESSURE: 55.8 MMHG
TRICUSPID ANNULAR PLANE SYSTOLIC EXCURSION: 1.7 CM

## 2024-04-24 RX ORDER — FUROSEMIDE 40 MG/1
20 TABLET ORAL DAILY
Qty: 90 TABLET | Refills: 1 | Status: SHIPPED | OUTPATIENT
Start: 2024-04-24 | End: 2025-04-24

## 2024-04-26 ENCOUNTER — TELEPHONE (OUTPATIENT)
Dept: CARDIOLOGY | Facility: CLINIC | Age: 89
End: 2024-04-26
Payer: MEDICARE

## 2024-05-01 ENCOUNTER — ANTICOAGULATION - WARFARIN VISIT (OUTPATIENT)
Dept: CARDIOLOGY | Facility: CLINIC | Age: 89
End: 2024-05-01
Payer: MEDICARE

## 2024-05-01 DIAGNOSIS — Z95.2 HISTORY OF AORTIC VALVE REPLACEMENT: Primary | ICD-10-CM

## 2024-05-01 DIAGNOSIS — I48.21 PERMANENT ATRIAL FIBRILLATION (MULTI): Primary | ICD-10-CM

## 2024-05-01 DIAGNOSIS — Z79.01 LONG TERM (CURRENT) USE OF ANTICOAGULANTS: ICD-10-CM

## 2024-05-01 LAB
POC INR: 4.2
POC PROTHROMBIN TIME: NORMAL

## 2024-05-01 PROCEDURE — 99211 OFF/OP EST MAY X REQ PHY/QHP: CPT

## 2024-05-01 NOTE — PROGRESS NOTES
Patient identification verified with 2 identifiers.    Location: [facility] - [address] [place] [id number] [phone]     Referring Physician: Dr. Anirudh Rehman  Enrollment/ Re-enrollment date: 2024.  Renewal sent electronically on 2024.   INR Goal: 2.5-3.5  INR monitoring is per [facility] protocol.  Anticoagulation Medication: warfarin  Indication: Aortic Valve Replacement    Subjective   Bleeding signs/symptoms: No    Bruising: No   Major bleeding event: No  Thrombosis signs/symptoms: No  Thromboembolic event: No  Missed doses: No  Extra doses: No  Medication changes: No  Dietary changes: No  Change in health: No  Change in activity: No  Alcohol: No  Other concerns: No    Upcoming Procedures:  Does the Patient Have any upcoming procedures that require interruption in anticoagulation therapy? no  Does the patient require bridging? no      Anticoagulation Summary  As of 2024      INR goal:  2.5-3.5   TTR:  48.8% (6.7 mo)   INR used for dosin.20 (2024)   Weekly warfarin total:  16.5 mg               Assessment/Plan   Supratherapeutic     1. New dose:  Hold Warfarin 24.     2. Next INR: 1 week      Education provided to patient during the visit:  Patient instructed to call in interim with questions, concerns and changes.   Patient educated on interactions between medications and warfarin.   Patient educated on dietary consistency in vitamin k consumption.   Patient educated on affects of alcohol consumption while taking warfarin.   Patient educated on signs of bleeding/clotting.   Patient educated on compliance with dosing, follow up appointments, and prescribed plan of care.

## 2024-05-08 ENCOUNTER — TELEPHONE (OUTPATIENT)
Dept: CARDIOLOGY | Facility: CLINIC | Age: 89
End: 2024-05-08
Payer: MEDICARE

## 2024-05-08 NOTE — TELEPHONE ENCOUNTER
Pt no show for coumadin clinic appointment.  Called pt and left message indicating I do still have open appointments this afternoon or can reschedule for another date.  Please call back.
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
## 2024-05-13 ENCOUNTER — TELEPHONE (OUTPATIENT)
Dept: CARDIOLOGY | Facility: CLINIC | Age: 89
End: 2024-05-13
Payer: MEDICARE

## 2024-05-17 ENCOUNTER — APPOINTMENT (OUTPATIENT)
Dept: CARDIOLOGY | Facility: CLINIC | Age: 89
End: 2024-05-17
Payer: MEDICARE

## 2024-05-20 ENCOUNTER — ANTICOAGULATION - WARFARIN VISIT (OUTPATIENT)
Dept: CARDIOLOGY | Facility: CLINIC | Age: 89
End: 2024-05-20
Payer: MEDICARE

## 2024-05-20 DIAGNOSIS — I48.21 PERMANENT ATRIAL FIBRILLATION (MULTI): ICD-10-CM

## 2024-05-20 DIAGNOSIS — Z95.2 HISTORY OF AORTIC VALVE REPLACEMENT: Primary | ICD-10-CM

## 2024-05-20 LAB
POC INR: 3.1
POC PROTHROMBIN TIME: NORMAL

## 2024-05-20 PROCEDURE — 99211 OFF/OP EST MAY X REQ PHY/QHP: CPT

## 2024-05-20 NOTE — PROGRESS NOTES
Patient identification verified with 2 identifiers.    Location: [facility] - [address] [place], [place] [address] [phone]     Referring Physician: Dr. Anirudh Rehman  Enrollment/ Re-enrollment date: 5//1/2025  INR Goal: 2.5-3.5  INR monitoring is per [facility] protocol.  Anticoagulation Medication: warfarin  Indication: Aortic Valve Replacement    Subjective   Bleeding signs/symptoms: No    Bruising: No   Major bleeding event: No  Thrombosis signs/symptoms: No  Thromboembolic event: No  Missed doses: No  Extra doses: No  Medication changes: No  Dietary changes: No  Change in health: No  Change in activity: No  Alcohol: No  Other concerns: No    Upcoming Procedures:  Does the Patient Have any upcoming procedures that require interruption in anticoagulation therapy? no  Does the patient require bridging? no      Anticoagulation Summary  As of 5/20/2024      INR goal:  2.5-3.5   TTR:  47.8% (7.3 mo)   INR used for dosing:  3.10 (5/20/2024)   Weekly warfarin total:  16.5 mg               Assessment/Plan   Therapeutic     1. New dose: no change    2. Next INR: 1 month      Education provided to patient during the visit:  Patient instructed to call in interim with questions, concerns and changes.   Patient educated on interactions between medications and warfarin.   Patient educated on dietary consistency in vitamin k consumption.   Patient educated on affects of alcohol consumption while taking warfarin.   Patient educated on signs of bleeding/clotting.   Patient educated on compliance with dosing, follow up appointments, and prescribed plan of care.

## 2024-06-17 ENCOUNTER — ANTICOAGULATION - WARFARIN VISIT (OUTPATIENT)
Dept: CARDIOLOGY | Facility: CLINIC | Age: 89
End: 2024-06-17
Payer: MEDICARE

## 2024-06-17 DIAGNOSIS — Z95.2 HISTORY OF AORTIC VALVE REPLACEMENT: Primary | ICD-10-CM

## 2024-06-17 DIAGNOSIS — I48.21 PERMANENT ATRIAL FIBRILLATION (MULTI): ICD-10-CM

## 2024-06-17 LAB
POC INR: 2.5
POC PROTHROMBIN TIME: NORMAL

## 2024-06-17 PROCEDURE — 99211 OFF/OP EST MAY X REQ PHY/QHP: CPT

## 2024-06-17 PROCEDURE — 85610 PROTHROMBIN TIME: CPT | Mod: QW

## 2024-06-17 NOTE — PROGRESS NOTES
Patient identification verified with 2 identifiers.    Location: [facility] - [address] [place], [place] [address] [phone]     Referring Physician: Dr. Anirudh Rehman  Enrollment/ Re-enrollment date: 2025   INR Goal: 2.5-3.5  INR monitoring is per [facility] protocol.  Anticoagulation Medication: warfarin  Indication: Aortic Valve Replacement    Subjective   Bleeding signs/symptoms: No    Bruising: No   Major bleeding event: No  Thrombosis signs/symptoms: No  Thromboembolic event: No  Missed doses: No  Extra doses: No  Medication changes: No  Dietary changes: No  Change in health: No  Change in activity: No  Alcohol: No  Other concerns: No    Upcoming Procedures:  Does the Patient Have any upcoming procedures that require interruption in anticoagulation therapy? no  Does the patient require bridging? no      Anticoagulation Summary  As of 2024      INR goal:  2.5-3.5   TTR:  53.6% (8.3 mo)   INR used for dosin.50 (2024)   Weekly warfarin total:  16.5 mg               Assessment/Plan   Therapeutic     1. New dose: no change    2. Next INR: 1 month      Education provided to patient during the visit:  Patient instructed to call in interim with questions, concerns and changes.   Patient educated on interactions between medications and warfarin.   Patient educated on dietary consistency in vitamin k consumption.   Patient educated on affects of alcohol consumption while taking warfarin.   Patient educated on signs of bleeding/clotting.   Patient educated on compliance with dosing, follow up appointments, and prescribed plan of care.

## 2024-07-15 ENCOUNTER — ANTICOAGULATION - WARFARIN VISIT (OUTPATIENT)
Dept: CARDIOLOGY | Facility: CLINIC | Age: 89
End: 2024-07-15
Payer: MEDICARE

## 2024-07-15 DIAGNOSIS — Z95.2 HISTORY OF AORTIC VALVE REPLACEMENT: Primary | ICD-10-CM

## 2024-07-15 DIAGNOSIS — I48.21 PERMANENT ATRIAL FIBRILLATION (MULTI): ICD-10-CM

## 2024-07-15 LAB
POC INR: 1.3
POC PROTHROMBIN TIME: NORMAL

## 2024-07-15 PROCEDURE — 99211 OFF/OP EST MAY X REQ PHY/QHP: CPT

## 2024-07-15 PROCEDURE — 85610 PROTHROMBIN TIME: CPT | Mod: QW

## 2024-07-15 NOTE — PROGRESS NOTES
Patient identification verified with 2 identifiers.    Location: [facility] - [address] [place], [place] [address] [phone]     Referring Physician: Dr. Anirudh Rehman  Enrollment/ Re-enrollment date: 2025   INR Goal: 2.5-3.5  INR monitoring is per [facility] protocol.  Anticoagulation Medication: warfarin  Indication: Aortic Valve Replacement    Subjective   Bleeding signs/symptoms: No    Bruising: No   Major bleeding event: No  Thrombosis signs/symptoms: No  Thromboembolic event: No  Missed doses: No  Extra doses: No  Medication changes: No  Dietary changes: No  Change in health: No  Change in activity: No  Alcohol: No  Other concerns: No    Upcoming Procedures:  Does the Patient Have any upcoming procedures that require interruption in anticoagulation therapy? no  Does the patient require bridging? no      Anticoagulation Summary  As of 7/15/2024      INR goal:  2.5-3.5   TTR:  48.1% (9.2 mo)   INR used for dosin.30 (7/15/2024)   Weekly warfarin total:  16.5 mg               Assessment/Plan   Sub-Therapeutic     1. New dose: take 5 mg today and maintain  2. Next INR: 1 week      Education provided to patient during the visit:  Patient instructed to call in interim with questions, concerns and changes.   Patient educated on interactions between medications and warfarin.   Patient educated on dietary consistency in vitamin k consumption.   Patient educated on affects of alcohol consumption while taking warfarin.   Patient educated on signs of bleeding/clotting.   Patient educated on compliance with dosing, follow up appointments, and prescribed plan of care.

## 2024-07-22 ENCOUNTER — APPOINTMENT (OUTPATIENT)
Dept: CARDIOLOGY | Facility: CLINIC | Age: 89
End: 2024-07-22
Payer: MEDICARE

## 2024-07-22 DIAGNOSIS — Z95.2 HISTORY OF AORTIC VALVE REPLACEMENT: Primary | ICD-10-CM

## 2024-07-22 DIAGNOSIS — I48.21 PERMANENT ATRIAL FIBRILLATION (MULTI): ICD-10-CM

## 2024-07-22 DIAGNOSIS — I48.92 ATRIAL FIBRILLATION AND FLUTTER (MULTI): ICD-10-CM

## 2024-07-22 DIAGNOSIS — I48.91 ATRIAL FIBRILLATION AND FLUTTER (MULTI): ICD-10-CM

## 2024-07-22 LAB
POC INR: 1.4
POC PROTHROMBIN TIME: NORMAL

## 2024-07-22 PROCEDURE — 99211 OFF/OP EST MAY X REQ PHY/QHP: CPT

## 2024-07-22 PROCEDURE — 85610 PROTHROMBIN TIME: CPT | Mod: QW

## 2024-07-22 RX ORDER — METOPROLOL SUCCINATE 50 MG/1
50 TABLET, EXTENDED RELEASE ORAL DAILY
Qty: 90 TABLET | Refills: 0 | Status: SHIPPED | OUTPATIENT
Start: 2024-07-22

## 2024-07-22 NOTE — PROGRESS NOTES
Patient identification verified with 2 identifiers.    Location: [facility] - [address] [place] [id number] [phone]     Referring Physician: Dr. Anirudh Rehman  Enrollment/ Re-enrollment date: 5/1/2025   INR Goal: 2.5-3.5  INR monitoring is per [facility] protocol.  Anticoagulation Medication: warfarin  Indication: Aortic Valve Replacement    Subjective   Bleeding signs/symptoms: No    Bruising: No   Major bleeding event: No  Thrombosis signs/symptoms: No  Thromboembolic event: No  Missed doses: Yes  Pt isn't sure of the dose he is taking because of his wife's situation.  Extra doses: No  Medication changes: No  Dietary changes: No  Change in health: No  Change in activity: No  Alcohol: No  Other concerns: No    Upcoming Procedures:  Does the Patient Have any upcoming procedures that require interruption in anticoagulation therapy? no  Does the patient require bridging? no      Anticoagulation Summary  As of 7/22/2024      INR goal:  2.5-3.5   TTR:  48.1% (9.2 mo)   INR used for dosing:  --               Assessment/Plan   Subtherapeutic     1. New dose:  Increase TWD.      2. Next INR: 1 week      Education provided to patient during the visit:  Patient instructed to call in interim with questions, concerns and changes.   Patient educated on interactions between medications and warfarin.   Patient educated on dietary consistency in vitamin k consumption.   Patient educated on affects of alcohol consumption while taking warfarin.   Patient educated on signs of bleeding/clotting.   Patient educated on compliance with dosing, follow up appointments, and prescribed plan of care.

## 2024-07-29 ENCOUNTER — ANTICOAGULATION - WARFARIN VISIT (OUTPATIENT)
Dept: CARDIOLOGY | Facility: CLINIC | Age: 89
End: 2024-07-29
Payer: MEDICARE

## 2024-07-29 DIAGNOSIS — Z95.2 HISTORY OF AORTIC VALVE REPLACEMENT: Primary | ICD-10-CM

## 2024-07-29 DIAGNOSIS — I48.21 PERMANENT ATRIAL FIBRILLATION (MULTI): ICD-10-CM

## 2024-07-29 LAB
POC INR: 3.4
POC PROTHROMBIN TIME: NORMAL

## 2024-07-29 PROCEDURE — 85610 PROTHROMBIN TIME: CPT | Mod: QW

## 2024-07-29 PROCEDURE — 99211 OFF/OP EST MAY X REQ PHY/QHP: CPT

## 2024-07-29 NOTE — PROGRESS NOTES
Patient identification verified with 2 identifiers.    Location: [facility] - [address] [place], [place] [address] [phone]     Referring Physician: Dr. Anirudh Rehman  Enrollment/ Re-enrollment date: 5/1/2025   INR Goal: 2.5-3.5  INR monitoring is per [facility] protocol.  Anticoagulation Medication: warfarin  Indication: Aortic Valve Replacement    Subjective   Bleeding signs/symptoms: No    Bruising: No   Major bleeding event: No  Thrombosis signs/symptoms: No  Thromboembolic event: No  Missed doses: No  Extra doses: No  Medication changes: No  Dietary changes: No  Change in health: No  Change in activity: No  Alcohol: No  Other concerns: No    Upcoming Procedures:  Does the Patient Have any upcoming procedures that require interruption in anticoagulation therapy? no  Does the patient require bridging? no      Anticoagulation Summary  As of 7/29/2024      INR goal:  2.5-3.5   TTR:  47.0% (9.7 mo)   INR used for dosing:  3.40 (7/29/2024)   Weekly warfarin total:  22.5 mg               Assessment/Plan   Therapeutic     1. New dose: no change    2. Next INR: 1 week      Education provided to patient during the visit:  Patient instructed to call in interim with questions, concerns and changes.   Patient educated on interactions between medications and warfarin.   Patient educated on dietary consistency in vitamin k consumption.   Patient educated on affects of alcohol consumption while taking warfarin.   Patient educated on signs of bleeding/clotting.   Patient educated on compliance with dosing, follow up appointments, and prescribed plan of care.

## 2024-08-05 ENCOUNTER — TELEPHONE (OUTPATIENT)
Dept: PRIMARY CARE | Facility: CLINIC | Age: 89
End: 2024-08-05

## 2024-08-05 ENCOUNTER — ANTICOAGULATION - WARFARIN VISIT (OUTPATIENT)
Dept: CARDIOLOGY | Facility: CLINIC | Age: 89
End: 2024-08-05
Payer: MEDICARE

## 2024-08-05 DIAGNOSIS — H44.50: ICD-10-CM

## 2024-08-05 DIAGNOSIS — I48.21 PERMANENT ATRIAL FIBRILLATION (MULTI): ICD-10-CM

## 2024-08-05 DIAGNOSIS — Z95.2 HISTORY OF AORTIC VALVE REPLACEMENT: ICD-10-CM

## 2024-08-05 DIAGNOSIS — Z95.2 HISTORY OF AORTIC VALVE REPLACEMENT: Primary | ICD-10-CM

## 2024-08-05 LAB
POC INR: 3.9
POC PROTHROMBIN TIME: NORMAL

## 2024-08-05 PROCEDURE — 85610 PROTHROMBIN TIME: CPT | Mod: QW

## 2024-08-05 PROCEDURE — 99211 OFF/OP EST MAY X REQ PHY/QHP: CPT

## 2024-08-05 NOTE — PROGRESS NOTES
Patient identification verified with 2 identifiers.    Location: [facility] - [address] [place], [place] [address] [phone]     Referring Physician: Dr. Anirudh Rehman  Enrollment/ Re-enrollment date: 5/1/2025   INR Goal: 2.5-3.5  INR monitoring is per [facility] protocol.  Anticoagulation Medication: warfarin  Indication: Aortic Valve Replacement    Subjective   Bleeding signs/symptoms: No    Bruising: No   Major bleeding event: No  Thrombosis signs/symptoms: No  Thromboembolic event: No  Missed doses: No  Extra doses: No  Medication changes: No  Dietary changes: No  Change in health: No  Change in activity: No  Alcohol: No  Other concerns: No    Upcoming Procedures:  Does the Patient Have any upcoming procedures that require interruption in anticoagulation therapy? no  Does the patient require bridging? no      Anticoagulation Summary  As of 8/5/2024      INR goal:  2.5-3.5   TTR:  46.3% (9.9 mo)   INR used for dosing:  3.90 (8/5/2024)   Weekly warfarin total:  20 mg               Assessment/Plan   Supratherapeutic     1. New dose:  Hold Warfarin 8/5/24     2. Next INR: 1 week      Education provided to patient during the visit:  Patient instructed to call in interim with questions, concerns and changes.   Patient educated on interactions between medications and warfarin.

## 2024-08-12 ENCOUNTER — ANTICOAGULATION - WARFARIN VISIT (OUTPATIENT)
Dept: CARDIOLOGY | Facility: CLINIC | Age: 89
End: 2024-08-12
Payer: MEDICARE

## 2024-08-12 DIAGNOSIS — I48.21 PERMANENT ATRIAL FIBRILLATION (MULTI): ICD-10-CM

## 2024-08-12 DIAGNOSIS — Z95.2 HISTORY OF AORTIC VALVE REPLACEMENT: Primary | ICD-10-CM

## 2024-08-12 LAB
POC INR: 8
POC PROTHROMBIN TIME: NORMAL

## 2024-08-12 PROCEDURE — 99211 OFF/OP EST MAY X REQ PHY/QHP: CPT

## 2024-08-12 PROCEDURE — 85610 PROTHROMBIN TIME: CPT | Mod: QW

## 2024-08-12 NOTE — TELEPHONE ENCOUNTER
Left message for pt and heidi from [facility] to notify order was placed with number to schedule

## 2024-08-12 NOTE — PROGRESS NOTES
Patient identification verified with 2 identifiers.    Location: [facility] - [address] [place], [place] [address] [phone]     Referring Physician: Dr. Anirudh Rehman  Enrollment/ Re-enrollment date: 2025   INR Goal: 2.5-3.5  INR monitoring is per [facility] protocol.  Anticoagulation Medication: warfarin  Indication: Aortic Valve Replacement    Subjective   Bleeding signs/symptoms: No    Bruising: No   Major bleeding event: No  Thrombosis signs/symptoms: No  Thromboembolic event: No  Missed doses: No  Extra doses: No  Medication changes: No  Dietary changes: No  Change in health: No  Change in activity: No  Alcohol: No  Other concerns: No    Upcoming Procedures:  Does the Patient Have any upcoming procedures that require interruption in anticoagulation therapy? no  Does the patient require bridging? no      Anticoagulation Summary  As of 2024      INR goal:  2.5-3.5   TTR:  45.3% (10.1 mo)   INR used for dosin.00 (2024)   Weekly warfarin total:  20 mg               Assessment/Plan   Supratherapeutic     1. New dose:  Hold Warfarin  & .     2. Next INR:  2 days.       Education provided to patient during the visit:  Patient instructed to call in interim with questions, concerns and changes.   Patient educated on interactions between medications and warfarin.   Patient educated on dietary consistency in vitamin k consumption.   Patient educated on affects of alcohol consumption while taking warfarin.   Patient educated on signs of bleeding/clotting.   Patient educated on compliance with dosing, follow up appointments, and prescribed plan of care.

## 2024-08-14 ENCOUNTER — ANTICOAGULATION - WARFARIN VISIT (OUTPATIENT)
Dept: CARDIOLOGY | Facility: CLINIC | Age: 89
End: 2024-08-14
Payer: MEDICARE

## 2024-08-14 DIAGNOSIS — Z95.2 HISTORY OF AORTIC VALVE REPLACEMENT: Primary | ICD-10-CM

## 2024-08-14 DIAGNOSIS — I48.21 PERMANENT ATRIAL FIBRILLATION (MULTI): ICD-10-CM

## 2024-08-14 LAB
POC INR: 5.9
POC PROTHROMBIN TIME: NORMAL

## 2024-08-14 PROCEDURE — 99211 OFF/OP EST MAY X REQ PHY/QHP: CPT

## 2024-08-14 PROCEDURE — 85610 PROTHROMBIN TIME: CPT | Mod: QW

## 2024-08-14 NOTE — PROGRESS NOTES
Patient identification verified with 2 identifiers.    Location: [facility] - [address] [place], [place] [address] [phone]     Referring Physician: Dr. Anirudh Rehman  Enrollment/ Re-enrollment date: 2025   INR Goal: 2.5-3.5  INR monitoring is per [facility] protocol.  Anticoagulation Medication: warfarin  Indication: Aortic Valve Replacement    Subjective   Bleeding signs/symptoms: No    Bruising: No   Major bleeding event: No  Thrombosis signs/symptoms: No  Thromboembolic event: No  Missed doses: No  Extra doses: No  Medication changes: No  Dietary changes: No  Change in health: No  Change in activity: No  Alcohol: No  Other concerns: No    Upcoming Procedures:  Does the Patient Have any upcoming procedures that require interruption in anticoagulation therapy? no  Does the patient require bridging? no      Anticoagulation Summary  As of 2024      INR goal:  2.5-3.5   TTR:  45.0% (10.2 mo)   INR used for dosin.90 (2024)   Weekly warfarin total:  20 mg               Assessment/Plan   Supratherapeutic     1. New dose:  Hold Warfarin  and 8/15.     2. Next INR:  2 days      Education provided to patient during the visit:  Patient instructed to call in interim with questions, concerns and changes.   Patient educated on interactions between medications and warfarin.   Patient educated on dietary consistency in vitamin k consumption.   Patient educated on affects of alcohol consumption while taking warfarin.   Patient educated on signs of bleeding/clotting.   Patient educated on compliance with dosing, follow up appointments, and prescribed plan of care.

## 2024-08-19 ENCOUNTER — TELEPHONE (OUTPATIENT)
Dept: CARDIOLOGY | Facility: CLINIC | Age: 89
End: 2024-08-19
Payer: MEDICARE

## 2024-08-19 ENCOUNTER — ANTICOAGULATION - WARFARIN VISIT (OUTPATIENT)
Dept: CARDIOLOGY | Facility: CLINIC | Age: 89
End: 2024-08-19
Payer: MEDICARE

## 2024-08-19 DIAGNOSIS — Z95.2 HISTORY OF AORTIC VALVE REPLACEMENT: Primary | ICD-10-CM

## 2024-08-19 DIAGNOSIS — I48.21 PERMANENT ATRIAL FIBRILLATION (MULTI): ICD-10-CM

## 2024-08-19 LAB
POC INR: 1.6 (ref 2.5–3.5)
POC PROTHROMBIN TIME: ABNORMAL

## 2024-08-19 PROCEDURE — 99211 OFF/OP EST MAY X REQ PHY/QHP: CPT

## 2024-08-19 PROCEDURE — 85610 PROTHROMBIN TIME: CPT | Mod: QW

## 2024-08-19 NOTE — TELEPHONE ENCOUNTER
Called pt regarding missed appointment Friday.  Pt states he is able to come in any time.  Rescheduled for today at 1145.

## 2024-08-19 NOTE — PROGRESS NOTES
Patient identification verified with 2 identifiers.    Location: [facility] - [address] [place], [place] [address] [phone]     Referring Physician: Dr. Anirudh Rehman  Enrollment/ Re-enrollment date: 2025   INR Goal: 2.5-3.5  INR monitoring is per [facility] protocol.  Anticoagulation Medication: warfarin  Indication: Aortic Valve Replacement    Subjective   Bleeding signs/symptoms: No    Bruising: No   Major bleeding event: No  Thrombosis signs/symptoms: No  Thromboembolic event: No  Missed doses: Yes  Extra doses: No  Medication changes: No  Dietary changes: No  Change in health: No  Change in activity: No  Alcohol: No  Other concerns: No    Upcoming Procedures:  Does the Patient Have any upcoming procedures that require interruption in anticoagulation therapy? no  Does the patient require bridging? no      Anticoagulation Summary  As of 2024      INR goal:  2.5-3.5   TTR:  44.6% (10.4 mo)   INR used for dosin.60 (2024)   Weekly warfarin total:  20 mg               Assessment/Plan   Subtherapeutic  Dr. Rehman notified of INR 1.6 and dosing plan via secure chat.      1. New dose:  5mg dose given today and normal dosing to resume .     2. Next INR:  4 days      Education provided to patient during the visit:  Patient instructed to call in interim with questions, concerns and changes.   Patient educated on interactions between medications and warfarin.   Patient educated on dietary consistency in vitamin k consumption.   Patient educated on affects of alcohol consumption while taking warfarin.   Patient educated on signs of bleeding/clotting.   Patient educated on compliance with dosing, follow up appointments, and prescribed plan of care.

## 2024-08-21 PROBLEM — I63.9 CEREBROVASCULAR ACCIDENT (CVA) (MULTI): Status: ACTIVE | Noted: 2024-08-21

## 2024-08-23 ENCOUNTER — APPOINTMENT (OUTPATIENT)
Dept: CARDIOLOGY | Facility: CLINIC | Age: 89
End: 2024-08-23
Payer: MEDICARE

## 2024-08-23 ENCOUNTER — ANTICOAGULATION - WARFARIN VISIT (OUTPATIENT)
Dept: CARDIOLOGY | Facility: CLINIC | Age: 89
End: 2024-08-23
Payer: MEDICARE

## 2024-08-23 ENCOUNTER — OFFICE VISIT (OUTPATIENT)
Dept: CARDIOLOGY | Facility: CLINIC | Age: 89
End: 2024-08-23
Payer: MEDICARE

## 2024-08-23 VITALS
BODY MASS INDEX: 22.51 KG/M2 | DIASTOLIC BLOOD PRESSURE: 82 MMHG | HEART RATE: 61 BPM | WEIGHT: 152 LBS | OXYGEN SATURATION: 97 % | HEIGHT: 69 IN | SYSTOLIC BLOOD PRESSURE: 140 MMHG

## 2024-08-23 DIAGNOSIS — Z95.2 HISTORY OF AORTIC VALVE REPLACEMENT: Primary | ICD-10-CM

## 2024-08-23 DIAGNOSIS — I48.21 PERMANENT ATRIAL FIBRILLATION (MULTI): ICD-10-CM

## 2024-08-23 DIAGNOSIS — I50.20 SYSTOLIC CONGESTIVE HEART FAILURE, UNSPECIFIED HF CHRONICITY (MULTI): Primary | ICD-10-CM

## 2024-08-23 LAB
POC INR: 3.5
POC PROTHROMBIN TIME: NORMAL

## 2024-08-23 PROCEDURE — 1036F TOBACCO NON-USER: CPT | Performed by: NURSE PRACTITIONER

## 2024-08-23 PROCEDURE — 1157F ADVNC CARE PLAN IN RCRD: CPT | Performed by: NURSE PRACTITIONER

## 2024-08-23 PROCEDURE — 99214 OFFICE O/P EST MOD 30 MIN: CPT | Performed by: NURSE PRACTITIONER

## 2024-08-23 PROCEDURE — 3079F DIAST BP 80-89 MM HG: CPT | Performed by: NURSE PRACTITIONER

## 2024-08-23 PROCEDURE — 1160F RVW MEDS BY RX/DR IN RCRD: CPT | Performed by: NURSE PRACTITIONER

## 2024-08-23 PROCEDURE — 99211 OFF/OP EST MAY X REQ PHY/QHP: CPT

## 2024-08-23 PROCEDURE — 3077F SYST BP >= 140 MM HG: CPT | Performed by: NURSE PRACTITIONER

## 2024-08-23 PROCEDURE — 1159F MED LIST DOCD IN RCRD: CPT | Performed by: NURSE PRACTITIONER

## 2024-08-23 PROCEDURE — 85610 PROTHROMBIN TIME: CPT | Mod: QW

## 2024-08-23 ASSESSMENT — ENCOUNTER SYMPTOMS
LOSS OF SENSATION IN FEET: 0
OCCASIONAL FEELINGS OF UNSTEADINESS: 0
CONSTITUTIONAL NEGATIVE: 1
GASTROINTESTINAL NEGATIVE: 1
CARDIOVASCULAR NEGATIVE: 1
NEUROLOGICAL NEGATIVE: 1
RESPIRATORY NEGATIVE: 1
MUSCULOSKELETAL NEGATIVE: 1
DEPRESSION: 0

## 2024-08-23 NOTE — PROGRESS NOTES
Patient identification verified with 2 identifiers.    Location: [facility] - [address] [place], [place] [address] [phone]     Referring Physician: Dr. Oleary  Enrollment/ Re-enrollment date: 5/1/2025   INR Goal: 2.5-3.5  INR monitoring is per [facility] protocol.  Anticoagulation Medication: warfarin  Indication: Aortic Valve Replacement    Subjective   Bleeding signs/symptoms: No    Bruising: No   Major bleeding event: No  Thrombosis signs/symptoms: No  Thromboembolic event: No  Missed doses: No  Extra doses: No  Medication changes: No  Dietary changes: No  Change in health: No  Change in activity: No  Alcohol: No  Other concerns: No    Upcoming Procedures:  Does the Patient Have any upcoming procedures that require interruption in anticoagulation therapy? no  Does the patient require bridging? no      Anticoagulation Summary  As of 8/23/2024      INR goal:  2.5-3.5   TTR:  44.7% (10.5 mo)   INR used for dosing:  3.50 (8/23/2024)   Weekly warfarin total:  20 mg               Assessment/Plan   Therapeutic     1. New dose: no change    2. Next INR: 1 week      Education provided to patient during the visit:  Patient instructed to call in interim with questions, concerns and changes.   Patient educated on interactions between medications and warfarin.   Patient educated on dietary consistency in vitamin k consumption.   Patient educated on affects of alcohol consumption while taking warfarin.   Patient educated on signs of bleeding/clotting.   Patient educated on compliance with dosing, follow up appointments, and prescribed plan of care.
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
## 2024-08-30 ENCOUNTER — ANTICOAGULATION - WARFARIN VISIT (OUTPATIENT)
Dept: CARDIOLOGY | Facility: CLINIC | Age: 89
End: 2024-08-30
Payer: MEDICARE

## 2024-08-30 DIAGNOSIS — I48.21 PERMANENT ATRIAL FIBRILLATION (MULTI): ICD-10-CM

## 2024-08-30 DIAGNOSIS — Z95.2 HISTORY OF AORTIC VALVE REPLACEMENT: Primary | ICD-10-CM

## 2024-08-30 DIAGNOSIS — Z95.2 HISTORY OF AORTIC VALVE REPLACEMENT: ICD-10-CM

## 2024-08-30 LAB
POC INR: 2.9
POC PROTHROMBIN TIME: NORMAL

## 2024-08-30 PROCEDURE — 99211 OFF/OP EST MAY X REQ PHY/QHP: CPT

## 2024-08-30 PROCEDURE — 85610 PROTHROMBIN TIME: CPT | Mod: QW

## 2024-08-30 RX ORDER — FUROSEMIDE 40 MG/1
20 TABLET ORAL DAILY
Qty: 90 TABLET | Refills: 1 | Status: SHIPPED | OUTPATIENT
Start: 2024-08-30 | End: 2024-08-30

## 2024-08-30 RX ORDER — FUROSEMIDE 20 MG/1
20 TABLET ORAL DAILY
Qty: 90 TABLET | Refills: 3 | Status: SHIPPED | OUTPATIENT
Start: 2024-08-30 | End: 2025-08-30

## 2024-08-30 NOTE — PROGRESS NOTES
Patient identification verified with 2 identifiers.    Location: [facility] - [address] [place], [place] [address] [phone]     Referring Physician: Dr. Anirudh Rehman  Enrollment/ Re-enrollment date: 2025   INR Goal: 2.5-3.5  INR monitoring is per [facility] protocol.  Anticoagulation Medication: warfarin  Indication: Aortic Valve Replacement    Subjective   Bleeding signs/symptoms: No    Bruising: No   Major bleeding event: No  Thrombosis signs/symptoms: No  Thromboembolic event: No  Missed doses: No  Extra doses: No  Medication changes: No  Dietary changes: No  Change in health: No  Change in activity: No  Alcohol: No  Other concerns: No    Upcoming Procedures:  Does the Patient Have any upcoming procedures that require interruption in anticoagulation therapy? no  Does the patient require bridging? no      Anticoagulation Summary  As of 2024      INR goal:  2.5-3.5   TTR:  45.9% (10.7 mo)   INR used for dosin.90 (2024)   Weekly warfarin total:  20 mg               Assessment/Plan   Therapeutic     1. New dose: no change    2. Next INR: 2 weeks.  First 2 weeks.       Education provided to patient during the visit:  Patient instructed to call in interim with questions, concerns and changes.   Patient educated on interactions between medications and warfarin.   Patient educated on dietary consistency in vitamin k consumption.   Patient educated on affects of alcohol consumption while taking warfarin.   Patient educated on signs of bleeding/clotting.   Patient educated on compliance with dosing, follow up appointments, and prescribed plan of care.

## 2024-09-04 ENCOUNTER — APPOINTMENT (OUTPATIENT)
Dept: OCCUPATIONAL THERAPY | Facility: CLINIC | Age: 89
End: 2024-09-04
Payer: MEDICARE

## 2024-09-05 ENCOUNTER — TELEPHONE (OUTPATIENT)
Dept: PRIMARY CARE | Facility: CLINIC | Age: 89
End: 2024-09-05
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
## 2024-09-13 ENCOUNTER — APPOINTMENT (OUTPATIENT)
Dept: CARDIOLOGY | Facility: CLINIC | Age: 89
End: 2024-09-13
Payer: MEDICARE

## 2024-09-13 ENCOUNTER — TELEPHONE (OUTPATIENT)
Dept: CARDIOLOGY | Facility: CLINIC | Age: 89
End: 2024-09-13
Payer: MEDICARE

## 2024-09-13 NOTE — TELEPHONE ENCOUNTER
Received VM from pt canceling today's anticoagulation appointment.  Called pt and rescheduled for Monday 9/16

## 2024-09-16 ENCOUNTER — ANTICOAGULATION - WARFARIN VISIT (OUTPATIENT)
Dept: CARDIOLOGY | Facility: CLINIC | Age: 89
End: 2024-09-16
Payer: MEDICARE

## 2024-09-16 DIAGNOSIS — Z95.2 HISTORY OF AORTIC VALVE REPLACEMENT: Primary | ICD-10-CM

## 2024-09-16 DIAGNOSIS — I48.21 PERMANENT ATRIAL FIBRILLATION (MULTI): ICD-10-CM

## 2024-09-16 LAB
POC INR: 5.3
POC PROTHROMBIN TIME: NORMAL

## 2024-09-16 PROCEDURE — 85610 PROTHROMBIN TIME: CPT | Mod: QW

## 2024-09-16 PROCEDURE — 99211 OFF/OP EST MAY X REQ PHY/QHP: CPT

## 2024-09-18 ENCOUNTER — APPOINTMENT (OUTPATIENT)
Dept: CARDIOLOGY | Facility: CLINIC | Age: 89
End: 2024-09-18
Payer: MEDICARE

## 2024-09-18 ENCOUNTER — HOSPITAL ENCOUNTER (INPATIENT)
Facility: HOSPITAL | Age: 89
LOS: 3 days | Discharge: HOME | End: 2024-09-21
Attending: STUDENT IN AN ORGANIZED HEALTH CARE EDUCATION/TRAINING PROGRAM | Admitting: INTERNAL MEDICINE
Payer: MEDICARE

## 2024-09-18 ENCOUNTER — APPOINTMENT (OUTPATIENT)
Dept: CARDIOLOGY | Facility: HOSPITAL | Age: 89
End: 2024-09-18
Payer: MEDICARE

## 2024-09-18 ENCOUNTER — APPOINTMENT (OUTPATIENT)
Dept: RADIOLOGY | Facility: HOSPITAL | Age: 89
End: 2024-09-18
Payer: MEDICARE

## 2024-09-18 ENCOUNTER — TELEPHONE (OUTPATIENT)
Dept: CARDIOLOGY | Facility: CLINIC | Age: 89
End: 2024-09-18
Payer: MEDICARE

## 2024-09-18 DIAGNOSIS — J81.0 ACUTE PULMONARY EDEMA: ICD-10-CM

## 2024-09-18 DIAGNOSIS — J96.01 ACUTE HYPOXIC RESPIRATORY FAILURE (MULTI): ICD-10-CM

## 2024-09-18 DIAGNOSIS — I48.91 ATRIAL FIBRILLATION, UNSPECIFIED TYPE (MULTI): ICD-10-CM

## 2024-09-18 DIAGNOSIS — I50.20 SYSTOLIC CONGESTIVE HEART FAILURE, UNSPECIFIED HF CHRONICITY: ICD-10-CM

## 2024-09-18 DIAGNOSIS — R79.1 SUPRATHERAPEUTIC INR: Primary | ICD-10-CM

## 2024-09-18 DIAGNOSIS — J18.9 PNEUMONIA OF RIGHT UPPER LOBE DUE TO INFECTIOUS ORGANISM: ICD-10-CM

## 2024-09-18 LAB
ALBUMIN SERPL BCP-MCNC: 3.6 G/DL (ref 3.4–5)
ALP SERPL-CCNC: 66 U/L (ref 33–136)
ALT SERPL W P-5'-P-CCNC: 21 U/L (ref 10–52)
ANION GAP SERPL CALCULATED.3IONS-SCNC: 12 MMOL/L (ref 10–20)
AST SERPL W P-5'-P-CCNC: 24 U/L (ref 9–39)
BASOPHILS # BLD AUTO: 0.03 X10*3/UL (ref 0–0.1)
BASOPHILS NFR BLD AUTO: 0.3 %
BILIRUB SERPL-MCNC: 0.8 MG/DL (ref 0–1.2)
BNP SERPL-MCNC: 2109 PG/ML (ref 0–99)
BUN SERPL-MCNC: 36 MG/DL (ref 6–23)
CALCIUM SERPL-MCNC: 9 MG/DL (ref 8.6–10.3)
CHLORIDE SERPL-SCNC: 109 MMOL/L (ref 98–107)
CO2 SERPL-SCNC: 24 MMOL/L (ref 21–32)
CREAT SERPL-MCNC: 1.54 MG/DL (ref 0.5–1.3)
EGFRCR SERPLBLD CKD-EPI 2021: 43 ML/MIN/1.73M*2
EOSINOPHIL # BLD AUTO: 0.03 X10*3/UL (ref 0–0.4)
EOSINOPHIL NFR BLD AUTO: 0.3 %
ERYTHROCYTE [DISTWIDTH] IN BLOOD BY AUTOMATED COUNT: 21.2 % (ref 11.5–14.5)
FLUAV RNA RESP QL NAA+PROBE: NOT DETECTED
FLUBV RNA RESP QL NAA+PROBE: NOT DETECTED
GLUCOSE SERPL-MCNC: 126 MG/DL (ref 74–99)
HCT VFR BLD AUTO: 33.7 % (ref 41–52)
HGB BLD-MCNC: 10 G/DL (ref 13.5–17.5)
HYPOCHROMIA BLD QL SMEAR: NORMAL
IMM GRANULOCYTES # BLD AUTO: 0.03 X10*3/UL (ref 0–0.5)
IMM GRANULOCYTES NFR BLD AUTO: 0.3 % (ref 0–0.9)
INR PPP: >8 (ref 0.9–1.2)
LYMPHOCYTES # BLD AUTO: 0.63 X10*3/UL (ref 0.8–3)
LYMPHOCYTES NFR BLD AUTO: 6.5 %
MCH RBC QN AUTO: 23.3 PG (ref 26–34)
MCHC RBC AUTO-ENTMCNC: 29.7 G/DL (ref 32–36)
MCV RBC AUTO: 78 FL (ref 80–100)
MONOCYTES # BLD AUTO: 0.97 X10*3/UL (ref 0.05–0.8)
MONOCYTES NFR BLD AUTO: 10 %
NEUTROPHILS # BLD AUTO: 7.98 X10*3/UL (ref 1.6–5.5)
NEUTROPHILS NFR BLD AUTO: 82.6 %
NRBC BLD-RTO: 0 /100 WBCS (ref 0–0)
OVALOCYTES BLD QL SMEAR: NORMAL
PLATELET # BLD AUTO: 502 X10*3/UL (ref 150–450)
POLYCHROMASIA BLD QL SMEAR: NORMAL
POTASSIUM SERPL-SCNC: 4.3 MMOL/L (ref 3.5–5.3)
PROT SERPL-MCNC: 7.2 G/DL (ref 6.4–8.2)
PROTHROMBIN TIME: 75.8 SECONDS (ref 9.3–12.7)
RBC # BLD AUTO: 4.3 X10*6/UL (ref 4.5–5.9)
RBC MORPH BLD: NORMAL
SARS-COV-2 RNA RESP QL NAA+PROBE: NOT DETECTED
SCHISTOCYTES BLD QL SMEAR: NORMAL
SODIUM SERPL-SCNC: 141 MMOL/L (ref 136–145)
T4 FREE SERPL-MCNC: 1.44 NG/DL (ref 0.61–1.12)
TSH SERPL-ACNC: 2.96 MIU/L (ref 0.44–3.98)
WBC # BLD AUTO: 9.7 X10*3/UL (ref 4.4–11.3)

## 2024-09-18 PROCEDURE — 99285 EMERGENCY DEPT VISIT HI MDM: CPT | Mod: 25

## 2024-09-18 PROCEDURE — 71046 X-RAY EXAM CHEST 2 VIEWS: CPT | Performed by: RADIOLOGY

## 2024-09-18 PROCEDURE — 84443 ASSAY THYROID STIM HORMONE: CPT | Performed by: INTERNAL MEDICINE

## 2024-09-18 PROCEDURE — 85025 COMPLETE CBC W/AUTO DIFF WBC: CPT | Performed by: STUDENT IN AN ORGANIZED HEALTH CARE EDUCATION/TRAINING PROGRAM

## 2024-09-18 PROCEDURE — 93005 ELECTROCARDIOGRAM TRACING: CPT

## 2024-09-18 PROCEDURE — 1100000001 HC PRIVATE ROOM DAILY

## 2024-09-18 PROCEDURE — 2500000002 HC RX 250 W HCPCS SELF ADMINISTERED DRUGS (ALT 637 FOR MEDICARE OP, ALT 636 FOR OP/ED): Performed by: STUDENT IN AN ORGANIZED HEALTH CARE EDUCATION/TRAINING PROGRAM

## 2024-09-18 PROCEDURE — 96374 THER/PROPH/DIAG INJ IV PUSH: CPT | Mod: 59

## 2024-09-18 PROCEDURE — 83880 ASSAY OF NATRIURETIC PEPTIDE: CPT | Performed by: STUDENT IN AN ORGANIZED HEALTH CARE EDUCATION/TRAINING PROGRAM

## 2024-09-18 PROCEDURE — 93010 ELECTROCARDIOGRAM REPORT: CPT | Performed by: INTERNAL MEDICINE

## 2024-09-18 PROCEDURE — 84439 ASSAY OF FREE THYROXINE: CPT | Performed by: INTERNAL MEDICINE

## 2024-09-18 PROCEDURE — 80053 COMPREHEN METABOLIC PANEL: CPT | Performed by: STUDENT IN AN ORGANIZED HEALTH CARE EDUCATION/TRAINING PROGRAM

## 2024-09-18 PROCEDURE — 85610 PROTHROMBIN TIME: CPT | Performed by: STUDENT IN AN ORGANIZED HEALTH CARE EDUCATION/TRAINING PROGRAM

## 2024-09-18 PROCEDURE — 36415 COLL VENOUS BLD VENIPUNCTURE: CPT | Performed by: STUDENT IN AN ORGANIZED HEALTH CARE EDUCATION/TRAINING PROGRAM

## 2024-09-18 PROCEDURE — 2500000001 HC RX 250 WO HCPCS SELF ADMINISTERED DRUGS (ALT 637 FOR MEDICARE OP): Performed by: INTERNAL MEDICINE

## 2024-09-18 PROCEDURE — 71046 X-RAY EXAM CHEST 2 VIEWS: CPT

## 2024-09-18 PROCEDURE — 99223 1ST HOSP IP/OBS HIGH 75: CPT | Performed by: INTERNAL MEDICINE

## 2024-09-18 PROCEDURE — 87636 SARSCOV2 & INF A&B AMP PRB: CPT | Performed by: STUDENT IN AN ORGANIZED HEALTH CARE EDUCATION/TRAINING PROGRAM

## 2024-09-18 PROCEDURE — 2500000004 HC RX 250 GENERAL PHARMACY W/ HCPCS (ALT 636 FOR OP/ED): Performed by: STUDENT IN AN ORGANIZED HEALTH CARE EDUCATION/TRAINING PROGRAM

## 2024-09-18 RX ORDER — ACETAMINOPHEN 650 MG/1
650 SUPPOSITORY RECTAL EVERY 4 HOURS PRN
Status: DISCONTINUED | OUTPATIENT
Start: 2024-09-18 | End: 2024-09-21 | Stop reason: HOSPADM

## 2024-09-18 RX ORDER — FUROSEMIDE 10 MG/ML
40 INJECTION INTRAMUSCULAR; INTRAVENOUS ONCE
Status: COMPLETED | OUTPATIENT
Start: 2024-09-18 | End: 2024-09-18

## 2024-09-18 RX ORDER — METOPROLOL SUCCINATE 25 MG/1
50 TABLET, EXTENDED RELEASE ORAL DAILY
Status: DISCONTINUED | OUTPATIENT
Start: 2024-09-18 | End: 2024-09-21 | Stop reason: HOSPADM

## 2024-09-18 RX ORDER — ACETAMINOPHEN 160 MG/5ML
650 SOLUTION ORAL EVERY 4 HOURS PRN
Status: DISCONTINUED | OUTPATIENT
Start: 2024-09-18 | End: 2024-09-21 | Stop reason: HOSPADM

## 2024-09-18 RX ORDER — ONDANSETRON 4 MG/1
4 TABLET, ORALLY DISINTEGRATING ORAL EVERY 8 HOURS PRN
Status: DISCONTINUED | OUTPATIENT
Start: 2024-09-18 | End: 2024-09-21 | Stop reason: HOSPADM

## 2024-09-18 RX ORDER — ACETAMINOPHEN 325 MG/1
650 TABLET ORAL EVERY 4 HOURS PRN
Status: DISCONTINUED | OUTPATIENT
Start: 2024-09-18 | End: 2024-09-21 | Stop reason: HOSPADM

## 2024-09-18 RX ORDER — MULTIVIT-MIN/IRON FUM/FOLIC AC 7.5 MG-4
1 TABLET ORAL DAILY
Status: DISCONTINUED | OUTPATIENT
Start: 2024-09-18 | End: 2024-09-21 | Stop reason: HOSPADM

## 2024-09-18 RX ORDER — COLESEVELAM 180 1/1
625 TABLET ORAL
Status: DISCONTINUED | OUTPATIENT
Start: 2024-09-19 | End: 2024-09-21 | Stop reason: HOSPADM

## 2024-09-18 RX ORDER — LOSARTAN POTASSIUM 100 MG/1
100 TABLET ORAL DAILY
Status: DISCONTINUED | OUTPATIENT
Start: 2024-09-18 | End: 2024-09-19

## 2024-09-18 RX ORDER — PHYTONADIONE 5 MG/1
10 TABLET ORAL ONCE
Status: COMPLETED | OUTPATIENT
Start: 2024-09-18 | End: 2024-09-18

## 2024-09-18 RX ORDER — ONDANSETRON HYDROCHLORIDE 2 MG/ML
4 INJECTION, SOLUTION INTRAVENOUS EVERY 8 HOURS PRN
Status: DISCONTINUED | OUTPATIENT
Start: 2024-09-18 | End: 2024-09-21 | Stop reason: HOSPADM

## 2024-09-18 SDOH — SOCIAL STABILITY: SOCIAL INSECURITY: ABUSE: ADULT

## 2024-09-18 SDOH — SOCIAL STABILITY: SOCIAL INSECURITY: HAVE YOU HAD THOUGHTS OF HARMING ANYONE ELSE?: NO

## 2024-09-18 SDOH — SOCIAL STABILITY: SOCIAL INSECURITY: WERE YOU ABLE TO COMPLETE ALL THE BEHAVIORAL HEALTH SCREENINGS?: NO

## 2024-09-18 SDOH — SOCIAL STABILITY: SOCIAL INSECURITY: DO YOU FEEL ANYONE HAS EXPLOITED OR TAKEN ADVANTAGE OF YOU FINANCIALLY OR OF YOUR PERSONAL PROPERTY?: NO

## 2024-09-18 SDOH — SOCIAL STABILITY: SOCIAL INSECURITY: HAS ANYONE EVER THREATENED TO HURT YOUR FAMILY OR YOUR PETS?: NO

## 2024-09-18 SDOH — SOCIAL STABILITY: SOCIAL INSECURITY: DO YOU FEEL UNSAFE GOING BACK TO THE PLACE WHERE YOU ARE LIVING?: NO

## 2024-09-18 SDOH — SOCIAL STABILITY: SOCIAL INSECURITY: ARE THERE ANY APPARENT SIGNS OF INJURIES/BEHAVIORS THAT COULD BE RELATED TO ABUSE/NEGLECT?: NO

## 2024-09-18 SDOH — SOCIAL STABILITY: SOCIAL INSECURITY: DOES ANYONE TRY TO KEEP YOU FROM HAVING/CONTACTING OTHER FRIENDS OR DOING THINGS OUTSIDE YOUR HOME?: NO

## 2024-09-18 SDOH — SOCIAL STABILITY: SOCIAL INSECURITY: ARE YOU OR HAVE YOU BEEN THREATENED OR ABUSED PHYSICALLY, EMOTIONALLY, OR SEXUALLY BY ANYONE?: NO

## 2024-09-18 ASSESSMENT — COGNITIVE AND FUNCTIONAL STATUS - GENERAL
DAILY ACTIVITIY SCORE: 24
MOBILITY SCORE: 24
PATIENT BASELINE BEDBOUND: NO
DAILY ACTIVITIY SCORE: 24
MOBILITY SCORE: 24

## 2024-09-18 ASSESSMENT — ACTIVITIES OF DAILY LIVING (ADL)
BATHING: INDEPENDENT
HEARING - LEFT EAR: FUNCTIONAL
LACK_OF_TRANSPORTATION: NO
HEARING - RIGHT EAR: FUNCTIONAL
JUDGMENT_ADEQUATE_SAFELY_COMPLETE_DAILY_ACTIVITIES: YES
LACK_OF_TRANSPORTATION: NO
TOILETING: INDEPENDENT
DRESSING YOURSELF: INDEPENDENT
PATIENT'S MEMORY ADEQUATE TO SAFELY COMPLETE DAILY ACTIVITIES?: YES
WALKS IN HOME: INDEPENDENT
FEEDING YOURSELF: INDEPENDENT
ADEQUATE_TO_COMPLETE_ADL: YES
GROOMING: INDEPENDENT

## 2024-09-18 ASSESSMENT — ENCOUNTER SYMPTOMS
AGITATION: 0
EYE DISCHARGE: 0
SHORTNESS OF BREATH: 1
PALPITATIONS: 0
COLOR CHANGE: 0
DECREASED CONCENTRATION: 0
ARTHRALGIAS: 0
DIFFICULTY URINATING: 0
ACTIVITY CHANGE: 1
ABDOMINAL DISTENTION: 0
LIGHT-HEADEDNESS: 1
DIZZINESS: 1
EYE REDNESS: 0
FLANK PAIN: 0
COUGH: 1

## 2024-09-18 ASSESSMENT — LIFESTYLE VARIABLES
SKIP TO QUESTIONS 9-10: 1
SUBSTANCE_ABUSE_PAST_12_MONTHS: NO
HOW OFTEN DO YOU HAVE A DRINK CONTAINING ALCOHOL: NEVER
HOW OFTEN DO YOU HAVE 6 OR MORE DRINKS ON ONE OCCASION: NEVER
AUDIT-C TOTAL SCORE: 0
HOW MANY STANDARD DRINKS CONTAINING ALCOHOL DO YOU HAVE ON A TYPICAL DAY: PATIENT DOES NOT DRINK
PRESCIPTION_ABUSE_PAST_12_MONTHS: NO
AUDIT-C TOTAL SCORE: 0

## 2024-09-18 ASSESSMENT — PAIN SCALES - GENERAL
PAINLEVEL_OUTOF10: 0 - NO PAIN

## 2024-09-18 ASSESSMENT — PAIN - FUNCTIONAL ASSESSMENT
PAIN_FUNCTIONAL_ASSESSMENT: 0-10
PAIN_FUNCTIONAL_ASSESSMENT: 0-10

## 2024-09-18 ASSESSMENT — PATIENT HEALTH QUESTIONNAIRE - PHQ9
2. FEELING DOWN, DEPRESSED OR HOPELESS: NOT AT ALL
1. LITTLE INTEREST OR PLEASURE IN DOING THINGS: NOT AT ALL
SUM OF ALL RESPONSES TO PHQ9 QUESTIONS 1 & 2: 0

## 2024-09-18 NOTE — PROGRESS NOTES
Pharmacy Medication History Review    Jose Martin Fleming is a 89 y.o. male admitted for Coughing up Blood. Pharmacy reviewed the patient's [id number] medications and allergies for accuracy.    The list below reflectives the updated PTA list. Please review each medication in order reconciliation for additional clarification and justification.  Prior to Admission Medications   Prescriptions Last Dose Informant Patient Reported? Taking?   ZINC ACETATE ORAL Past Month  Yes Yes   Sig: Take 1 tablet by mouth once daily.   amiodarone (Pacerone) 200 mg tablet 9/17/2024 at pm  No Yes   Sig: Take 1 tablet (200 mg) by mouth once daily.   aspirin (ASPIR-81 ORAL) 9/17/2024 at am  Yes Yes   Sig: Take 1 tablet by mouth once daily.   b complex 0.4 mg tablet Past Month  Yes Yes   Sig: Take 1 tablet by mouth once daily.   clotrimazole-betamethasone (Lotrisone) cream Past Month  Yes Yes   Sig: Apply 1 Application topically 2 times a day.   colesevelam (WelChoL) 625 mg tablet 9/17/2024 at pm  Yes Yes   Sig: Take 1 mg by mouth once daily at noon. Take with meals.   furosemide (Lasix) 20 mg tablet Past Week  No Yes   Sig: Take 1 tablet (20 mg) by mouth once daily.   losartan (Cozaar) 100 mg tablet 9/17/2024 at pm  Yes Yes   Sig: Take 1 tablet (100 mg) by mouth once daily.   metoprolol succinate XL (Toprol XL) 50 mg 24 hr tablet 9/17/2024 at pm  No Yes   Sig: Take 1 tablet (50 mg) by mouth once daily. Do not crush or chew.   warfarin (Coumadin) 2 mg tablet 9/12/2024  No No   Sig: Take 1 tablet (2 mg) by mouth see administration instructions. Take as directed per After Visit Summary.      Facility-Administered Medications: None          The list below reflectives the updated allergy list. Please review each documented allergy for additional clarification and justification.  Allergies  Reviewed by Crystal Narvaez CPhT on 9/18/2024        Severity Reactions Comments    Rosuvastatin Low Other Muscle aches            Below are additional  concerns with the patient's PTA list.  - pt stated his last dose of Warfarin was on Thursday 09/12/2024.      TUCKER SHARMA CPhT

## 2024-09-18 NOTE — ED PROVIDER NOTES
HPI   Chief Complaint   Patient presents with    Coughing Up Blood     Pt presents to ed with c/c of hemoptysis and weakness. X several weeks. Pt states he has Hx of CHF, states last doc he saw told him his Ejection fraction was 25%. Pt presents hypoxic, weak at 81% spo2. Pt placed on 4l NC which brought him up to 94%. Pt does not receive O2 therapy at home. Pt denies pain, dizziness, admits to weakness. Pt is aox4, stable.       This is an 89-year-old male with a past medical history of CHF, atrial fibrillation on warfarin and amiodarone, presenting the ED for evaluation of cough with bloody sputum.  He states that he had a cough probably for couple of weeks, he notes that today he has been coughing up some sputum that is tinged with blood, pink in color.  He was recently found to have an INR of 5 and has been off his warfarin for about a week, he supposed to have it rechecked this afternoon.  He states that he is chronically short of breath and has not noticed any acute worsening of this.  He denies any associated chest pain with his symptoms, any sore throat or nasal congestion.      History provided by:  Patient   used: No            Patient History   Past Medical History:   Diagnosis Date    Anemia 09/02/2023    Aortic stenosis 09/02/2023    Aortic valve disorder 09/02/2023    At risk for bleeding 09/02/2023    Atrial fibrillation (Multi) 09/02/2023    Cerebrovascular accident (CVA) (Multi) 09/02/2023    History of aortic valve replacement 09/02/2023    Hypercholesterolemia 09/02/2023    Hypertension 09/02/2023    Long term (current) use of anticoagulants 09/25/2023    Personal history of other diseases of the circulatory system 07/18/2014    History of aortic valve disorder    Personal history of other diseases of the nervous system and sense organs     History of cataract    Prediabetes 09/02/2023    Preglaucoma, unspecified, unspecified eye     Glaucoma suspect     Past Surgical History:    Procedure Laterality Date    ANKLE FRACTURE SURGERY  1989    APPENDECTOMY  10/08/2013    Appendectomy    COLONOSCOPY  2010    IR EMBOLIZATION  09/09/2021    IR EMBOLIZATION [facility]    MECHANICAL AORTIC VALVE REPLACEMENT  1985    [facility]    TOTAL HIP ARTHROPLASTY Left 1999    TOTAL HIP ARTHROPLASTY Right 2005     Family History   Problem Relation Name Age of Onset    Heart attack Father      Sudden death Father          Cardiac    Diabetes Brother       Social History     Tobacco Use    Smoking status: Former     Current packs/day: 1.00     Average packs/day: 1 pack/day for 53.0 years (53.0 ttl pk-yrs)     Types: Cigarettes    Smokeless tobacco: Never   Substance Use Topics    Alcohol use: Not Currently    Drug use: Never       Physical Exam   ED Triage Vitals [09/18/24 1415]   Temperature Heart Rate Respirations BP   36.9 °C (98.4 °F) 60 20 140/70      Pulse Ox Temp Source Heart Rate Source Patient Position   (!) 81 % Oral Monitor Sitting      BP Location FiO2 (%)     Right arm --       Physical Exam  General: well developed, well nourished elderly male who is awake and alert, in no apparent distress  Eyes: sclera clear bilaterally, PERRL, EOMI  HENT: normocephalic, atraumatic. Pharynx without erythema or exudates, uvula midline.  CV: regular rate and rhythm, no murmur, no gallops, or rubs. radial and dorsalis pedis pulses +2/4 bilaterally  Resp: clear to ascultation bilaterally, no wheezes, rales, or rhonchi  GI: abdomen soft, nontender without rigidity or guarding, no peritoneal signs, abdomen is nondistended, no masses palpated  MSK: no swelling of the extremities.  Psych: appropriate mood and affect, cooperative with exam  Skin: warm, dry, without evidence of rash or abrasions    ED Course & MDM   ED Course as of 09/18/24 1535   Wed Sep 18, 2024   1439 EKG my interpretation shows sinus rhythm with rate of 56 beats minute.  Left axis deviation.  There is a left bundle branch block present.  PVC  present.  There is no ST elevation or depression, no acute ischemic pattern.  No STEMI.  Morphology appears unchanged compared to prior EKG from January of this year. [NT]   1509 INR(!!): >8.0 [NT]      ED Course User Index  [NT] Edilberto Daley DO         Diagnoses as of 09/18/24 1535   Acute hypoxic respiratory failure (Multi)   Supratherapeutic INR   Acute pulmonary edema (Multi)                 No data recorded     [place] Coma Scale Score: 15 (09/18/24 1416 : Dahlia Redmond RN)                           Medical Decision Making  He is in no acute distress the emergency department.  On arrival he was found to be hypoxemic at 81% in triage.  He is minimally hypertensive, vitals otherwise normal.  He was brought back to room and placed on supplemental oxygen.  He is not chronically on oxygen at home.  Infectious workup ordered to assess for evidence of COVID flu, pneumonia, BNP ordered to assess for evidence of heart failure in conjunction with chest x-ray for pulmonary edema.  He has no chest pain, EKG ordered to screen for evidence of ACS although clinically I have low suspicion for this.      Patient's INR is greater than 8, he also has pulm edema on my independent review of his chest x-ray, elevated BNP level suggestive of an element of CHF exacerbation causing his hypoxia today.  IV Lasix was given.  I suspect that he is not taking his medication correctly because he states he has not been taking his warfarin for 5 days after an INR of 5, today's INR is greater than 8.  I gave him oral vitamin K, there is no signs of active bleeding on exam.  He will be admitted to the hospital for further management.    Labs Reviewed   CBC WITH AUTO DIFFERENTIAL - Abnormal       Result Value    WBC 9.7      nRBC 0.0      RBC 4.30 (*)     Hemoglobin 10.0 (*)     Hematocrit 33.7 (*)     MCV 78 (*)     MCH 23.3 (*)     MCHC 29.7 (*)     RDW 21.2 (*)     Platelets 502 (*)     Neutrophils % 82.6      Immature Granulocytes %,  Automated 0.3      Lymphocytes % 6.5      Monocytes % 10.0      Eosinophils % 0.3      Basophils % 0.3      Neutrophils Absolute 7.98 (*)     Immature Granulocytes Absolute, Automated 0.03      Lymphocytes Absolute 0.63 (*)     Monocytes Absolute 0.97 (*)     Eosinophils Absolute 0.03      Basophils Absolute 0.03     COMPREHENSIVE METABOLIC PANEL - Abnormal    Glucose 126 (*)     Sodium 141      Potassium 4.3      Chloride 109 (*)     Bicarbonate 24      Anion Gap 12      Urea Nitrogen 36 (*)     Creatinine 1.54 (*)     eGFR 43 (*)     Calcium 9.0      Albumin 3.6      Alkaline Phosphatase 66      Total Protein 7.2      AST 24      Bilirubin, Total 0.8      ALT 21     PROTIME-INR - Abnormal    Protime 75.8 (*)     INR >8.0 (*)     Narrative:     INR Therapeutic Range: 2.0-3.5   B-TYPE NATRIURETIC PEPTIDE - Abnormal    BNP 2,109 (*)     Narrative:        <100 pg/mL - Heart failure unlikely  100-299 pg/mL - Intermediate probability of acute heart                  failure exacerbation. Correlate with clinical                  context and patient history.    >=300 pg/mL - Heart Failure likely. Correlate with clinical                  context and patient history.    BNP testing is performed using different testing methodology at [facility] than at other [facility]. Direct result comparisons should only be made within the same method.      SARS-COV-2 PCR - Normal    Coronavirus 2019, PCR Not Detected      Narrative:     This assay has received FDA Emergency Use Authorization (EUA) and is only authorized for the duration of time that circumstances exist to justify the authorization of the emergency use of in vitro diagnostic tests for the detection of SARS-CoV-2 virus and/or diagnosis of COVID-19 infection under section 564(b)(1) of the Act, 21 U.S.C. 360bbb-3(b)(1). This assay is an in vitro diagnostic nucleic acid amplification test for the qualitative detection of SARS-CoV-2 from nasopharyngeal  specimens and has been validated for use at [facility]. Negative results do not preclude COVID-19 infections and should not be used as the sole basis for diagnosis, treatment, or other management decisions.     INFLUENZA A AND B PCR - Normal    Flu A Result Not Detected      Flu B Result Not Detected      Narrative:     This assay is an in vitro diagnostic multiplex nucleic acid amplification test for the detection and discrimination of Influenza A & B from nasopharyngeal specimens, and has been validated for use at [facility]. Negative results do not preclude Influenza A/B infections, and should not be used as the sole basis for diagnosis, treatment, or other management decisions. If Influenza A/B and RSV PCR results are negative, testing for Parainfluenza virus, Adenovirus and Metapneumovirus is routinely performed for [facility] pediatric oncology and intensive care inpatients, and is available on other patients by placing an add-on request.   MORPHOLOGY    RBC Morphology See Below      Polychromasia Mild      Hypochromia Mild      RBC Fragments Few      Ovalocytes Few       XR chest 2 views    (Results Pending)         Procedure  Procedures    Critical Care Time: 15 minutes excluding time spent performing procedures, treating other patients, and teaching time.    Critical Concern/Vital Organ System Affected: Hypoxia requiring supplemental oxygen, CHF, elevated INR    Critical Intervention: Oral vitamin K for elevated INR, IV Lasix given for pulmonary edema, independent review of chest x-ray, discussion with patient and family regarding his workup.     Edilberto Daley,   09/18/24 1534

## 2024-09-18 NOTE — CARE PLAN
Problem: Fall/Injury  Goal: Not fall by end of shift  Outcome: Progressing  Goal: Be free from injury by end of the shift  Outcome: Progressing  Goal: Verbalize understanding of personal risk factors for fall in the hospital  Outcome: Progressing  Goal: Verbalize understanding of risk factor reduction measures to prevent injury from fall in the home  Outcome: Progressing  Goal: Use assistive devices by end of the shift  Outcome: Progressing  Goal: Pace activities to prevent fatigue by end of the shift  Outcome: Progressing   The patient's goals for the shift include      The clinical goals for the shift include feel better    Over the shift, the patient did not make progress toward the following goals. Barriers to progression include . Recommendations to address these barriers include .

## 2024-09-18 NOTE — TELEPHONE ENCOUNTER
NS for appt today.  Checking chart, pt in [facility] ED for hemoptysis.  His INR is >8.

## 2024-09-18 NOTE — H&P
History Of Present Illness  Jose Martin Fleming is a 89 y.o. male presenting with hemoptysis for the past 3 days. He noted associated dyspnea. He stated that he follow with coumadin clinic and they told her to hold coumadin for 3 days which he did. He went back again and his INR was still high.   He denied fever and chills.  Review of patient medication showed that he has been on Amiodarone which can cause rise in patient's INR.   In the ER, his INR was noted to be greater than 8. He was given oral Vitamin K and admitted for additional work up.      Past Medical History  He has a past medical history of Anemia (09/02/2023), Aortic stenosis (09/02/2023), Aortic valve disorder (09/02/2023), At risk for bleeding (09/02/2023), Atrial fibrillation (Multi) (09/02/2023), Cerebrovascular accident (CVA) (Multi) (09/02/2023), History of aortic valve replacement (09/02/2023), Hypercholesterolemia (09/02/2023), Hypertension (09/02/2023), Long term (current) use of anticoagulants (09/25/2023), Personal history of other diseases of the circulatory system (07/18/2014), Personal history of other diseases of the nervous system and sense organs, Prediabetes (09/02/2023), and Preglaucoma, unspecified, unspecified eye.    Surgical History  He has a past surgical history that includes Appendectomy (10/08/2013); IR embolization (09/09/2021); Mechanical aortic valve replacement (1985); Total hip arthroplasty (Left, 1999); Ankle fracture surgery (1989); Total hip arthroplasty (Right, 2005); and Colonoscopy (2010).     Social History  He reports that he has quit smoking. His smoking use included cigarettes. He has a 53 pack-year smoking history. He has never used smokeless tobacco. He reports that he does not currently use alcohol. He reports that he does not use drugs.    Family History  Family History   Problem Relation Name Age of Onset    Heart attack Father      Sudden death Father          Cardiac    Diabetes Brother           Allergies  Rosuvastatin    Review of Systems   Constitutional:  Positive for activity change.   HENT:  Positive for congestion.    Eyes:  Negative for discharge and redness.   Respiratory:  Positive for cough and shortness of breath.    Cardiovascular:  Negative for chest pain, palpitations and leg swelling.   Gastrointestinal:  Negative for abdominal distention.   Endocrine: Negative for cold intolerance.   Genitourinary:  Negative for difficulty urinating and flank pain.   Musculoskeletal:  Negative for arthralgias.   Skin:  Negative for color change.   Neurological:  Positive for dizziness and light-headedness.   Psychiatric/Behavioral:  Negative for agitation and decreased concentration.         Physical Exam  Constitutional:       Appearance: Normal appearance.   HENT:      Head: Normocephalic and atraumatic.      Mouth/Throat:      Mouth: Mucous membranes are moist.      Pharynx: Oropharynx is clear.   Eyes:      Conjunctiva/sclera: Conjunctivae normal.      Pupils: Pupils are equal, round, and reactive to light.   Cardiovascular:      Rate and Rhythm: Normal rate and regular rhythm.      Pulses: Normal pulses.      Heart sounds: Normal heart sounds.   Pulmonary:      Effort: Pulmonary effort is normal.      Breath sounds: Rales present.   Musculoskeletal:         General: Normal range of motion.   Skin:     General: Skin is warm.   Neurological:      General: No focal deficit present.      Mental Status: He is alert and oriented to person, place, and time.   Psychiatric:         Mood and Affect: Mood normal.         Behavior: Behavior normal.          Last Recorded Vitals  /70   Pulse 55   Temp 36.9 °C (98.4 °F) (Oral) Comment: Simultaneous filing. User may not have seen previous data. Comment (Src): Simultaneous filing. User may not have seen previous data.  Resp 16   Wt 68.1 kg (150 lb 2.1 oz) Comment: Simultaneous filing. User may not have seen previous data.  SpO2 96%     Relevant  Results        Assessment/Plan     Acute respiratory failure with Hypoxia:  was never on oxygen, but now on 4 L of oxygen and saturating well. Concern for pulm edema/congestion from hx of CHF. Continue supplemental oxygen. Wean off as tolerated  Hemoptysis:  related to supratherapeutic INR.   Supratherapeutic INR: continue to monitor INR daily. Amiodarone is most likely the cause of elevated INR. Hold amio for now. Consult cardiology for help with deciding if med can be safely countinued. If med is continued, will need to decrease INR dosing and frequency.  Congestive heart failure with systolic dysfunction:  hold oral lasix. Switch to IV lasix for 2 to 4 doses, then switch back to p.o lasix in anticipation for discharge. Low Na diet. Noted poor EF on last admission.   Hypertension Essential:  good control. Continue home meds.    Eladio Garibay MD

## 2024-09-19 ENCOUNTER — APPOINTMENT (OUTPATIENT)
Dept: RADIOLOGY | Facility: HOSPITAL | Age: 89
End: 2024-09-19
Payer: MEDICARE

## 2024-09-19 LAB
ANION GAP SERPL CALCULATED.3IONS-SCNC: 11 MMOL/L (ref 10–20)
ATRIAL RATE: 340 BPM
BUN SERPL-MCNC: 36 MG/DL (ref 6–23)
CALCIUM SERPL-MCNC: 8.4 MG/DL (ref 8.6–10.3)
CHLORIDE SERPL-SCNC: 108 MMOL/L (ref 98–107)
CO2 SERPL-SCNC: 27 MMOL/L (ref 21–32)
CREAT SERPL-MCNC: 1.62 MG/DL (ref 0.5–1.3)
EGFRCR SERPLBLD CKD-EPI 2021: 40 ML/MIN/1.73M*2
ERYTHROCYTE [DISTWIDTH] IN BLOOD BY AUTOMATED COUNT: 20.7 % (ref 11.5–14.5)
GLUCOSE SERPL-MCNC: 70 MG/DL (ref 74–99)
HCT VFR BLD AUTO: 31.2 % (ref 41–52)
HGB BLD-MCNC: 9.2 G/DL (ref 13.5–17.5)
INR PPP: 1.9 (ref 0.9–1.2)
INR PPP: 2.6 (ref 0.9–1.2)
MCH RBC QN AUTO: 23.3 PG (ref 26–34)
MCHC RBC AUTO-ENTMCNC: 29.5 G/DL (ref 32–36)
MCV RBC AUTO: 79 FL (ref 80–100)
NRBC BLD-RTO: 0 /100 WBCS (ref 0–0)
P OFFSET: 176 MS
P ONSET: 130 MS
PLATELET # BLD AUTO: 448 X10*3/UL (ref 150–450)
POTASSIUM SERPL-SCNC: 3.9 MMOL/L (ref 3.5–5.3)
PROTHROMBIN TIME: 18.7 SECONDS (ref 9.3–12.7)
PROTHROMBIN TIME: 24.9 SECONDS (ref 9.3–12.7)
Q ONSET: 206 MS
QRS COUNT: 9 BEATS
QRS DURATION: 166 MS
QT INTERVAL: 508 MS
QTC CALCULATION(BAZETT): 490 MS
QTC FREDERICIA: 496 MS
R AXIS: -41 DEGREES
RBC # BLD AUTO: 3.95 X10*6/UL (ref 4.5–5.9)
SODIUM SERPL-SCNC: 142 MMOL/L (ref 136–145)
T AXIS: 110 DEGREES
T OFFSET: 460 MS
VENTRICULAR RATE: 56 BPM
WBC # BLD AUTO: 9.1 X10*3/UL (ref 4.4–11.3)

## 2024-09-19 PROCEDURE — 2500000001 HC RX 250 WO HCPCS SELF ADMINISTERED DRUGS (ALT 637 FOR MEDICARE OP): Performed by: INTERNAL MEDICINE

## 2024-09-19 PROCEDURE — 9420000001 HC RT PATIENT EDUCATION 5 MIN

## 2024-09-19 PROCEDURE — 2500000004 HC RX 250 GENERAL PHARMACY W/ HCPCS (ALT 636 FOR OP/ED): Performed by: INTERNAL MEDICINE

## 2024-09-19 PROCEDURE — 85610 PROTHROMBIN TIME: CPT | Performed by: INTERNAL MEDICINE

## 2024-09-19 PROCEDURE — 99222 1ST HOSP IP/OBS MODERATE 55: CPT | Performed by: INTERNAL MEDICINE

## 2024-09-19 PROCEDURE — 97161 PT EVAL LOW COMPLEX 20 MIN: CPT | Mod: GP

## 2024-09-19 PROCEDURE — 87449 NOS EACH ORGANISM AG IA: CPT | Mod: TRILAB,WESLAB | Performed by: INTERNAL MEDICINE

## 2024-09-19 PROCEDURE — 2500000002 HC RX 250 W HCPCS SELF ADMINISTERED DRUGS (ALT 637 FOR MEDICARE OP, ALT 636 FOR OP/ED): Performed by: INTERNAL MEDICINE

## 2024-09-19 PROCEDURE — 2500000005 HC RX 250 GENERAL PHARMACY W/O HCPCS: Performed by: INTERNAL MEDICINE

## 2024-09-19 PROCEDURE — 84145 PROCALCITONIN (PCT): CPT | Mod: TRILAB,WESLAB | Performed by: INTERNAL MEDICINE

## 2024-09-19 PROCEDURE — 94640 AIRWAY INHALATION TREATMENT: CPT

## 2024-09-19 PROCEDURE — 71250 CT THORAX DX C-: CPT | Performed by: RADIOLOGY

## 2024-09-19 PROCEDURE — 80048 BASIC METABOLIC PNL TOTAL CA: CPT | Performed by: INTERNAL MEDICINE

## 2024-09-19 PROCEDURE — 97530 THERAPEUTIC ACTIVITIES: CPT | Mod: GP

## 2024-09-19 PROCEDURE — 71250 CT THORAX DX C-: CPT

## 2024-09-19 PROCEDURE — 99222 1ST HOSP IP/OBS MODERATE 55: CPT | Performed by: NURSE PRACTITIONER

## 2024-09-19 PROCEDURE — 36415 COLL VENOUS BLD VENIPUNCTURE: CPT | Performed by: INTERNAL MEDICINE

## 2024-09-19 PROCEDURE — 94664 DEMO&/EVAL PT USE INHALER: CPT

## 2024-09-19 PROCEDURE — 85027 COMPLETE CBC AUTOMATED: CPT | Performed by: INTERNAL MEDICINE

## 2024-09-19 PROCEDURE — 97165 OT EVAL LOW COMPLEX 30 MIN: CPT | Mod: GO

## 2024-09-19 PROCEDURE — 87040 BLOOD CULTURE FOR BACTERIA: CPT | Mod: TRILAB | Performed by: INTERNAL MEDICINE

## 2024-09-19 PROCEDURE — 1100000001 HC PRIVATE ROOM DAILY

## 2024-09-19 PROCEDURE — 99232 SBSQ HOSP IP/OBS MODERATE 35: CPT | Performed by: INTERNAL MEDICINE

## 2024-09-19 PROCEDURE — 87899 AGENT NOS ASSAY W/OPTIC: CPT | Mod: TRILAB,WESLAB | Performed by: INTERNAL MEDICINE

## 2024-09-19 RX ORDER — IPRATROPIUM BROMIDE AND ALBUTEROL SULFATE 2.5; .5 MG/3ML; MG/3ML
3 SOLUTION RESPIRATORY (INHALATION)
Status: DISCONTINUED | OUTPATIENT
Start: 2024-09-20 | End: 2024-09-21 | Stop reason: HOSPADM

## 2024-09-19 RX ORDER — IPRATROPIUM BROMIDE AND ALBUTEROL SULFATE 2.5; .5 MG/3ML; MG/3ML
3 SOLUTION RESPIRATORY (INHALATION)
Status: DISCONTINUED | OUTPATIENT
Start: 2024-09-19 | End: 2024-09-19

## 2024-09-19 RX ORDER — DOXYCYCLINE 100 MG/1
100 CAPSULE ORAL EVERY 12 HOURS SCHEDULED
Status: DISCONTINUED | OUTPATIENT
Start: 2024-09-19 | End: 2024-09-21 | Stop reason: HOSPADM

## 2024-09-19 RX ORDER — FUROSEMIDE 40 MG/1
40 TABLET ORAL DAILY
Status: DISCONTINUED | OUTPATIENT
Start: 2024-09-19 | End: 2024-09-21 | Stop reason: HOSPADM

## 2024-09-19 RX ORDER — AMIODARONE HYDROCHLORIDE 200 MG/1
200 TABLET ORAL DAILY
Status: DISCONTINUED | OUTPATIENT
Start: 2024-09-19 | End: 2024-09-21 | Stop reason: HOSPADM

## 2024-09-19 RX ORDER — CEFTRIAXONE 1 G/50ML
1 INJECTION, SOLUTION INTRAVENOUS EVERY 24 HOURS
Status: DISCONTINUED | OUTPATIENT
Start: 2024-09-19 | End: 2024-09-21 | Stop reason: HOSPADM

## 2024-09-19 SDOH — HEALTH STABILITY: MENTAL HEALTH
HOW OFTEN DO YOU NEED TO HAVE SOMEONE HELP YOU WHEN YOU READ INSTRUCTIONS, PAMPHLETS, OR OTHER WRITTEN MATERIAL FROM YOUR DOCTOR OR PHARMACY?: NEVER

## 2024-09-19 ASSESSMENT — COGNITIVE AND FUNCTIONAL STATUS - GENERAL
WALKING IN HOSPITAL ROOM: A LITTLE
CLIMB 3 TO 5 STEPS WITH RAILING: A LITTLE
CLIMB 3 TO 5 STEPS WITH RAILING: A LITTLE
WALKING IN HOSPITAL ROOM: A LITTLE
DRESSING REGULAR LOWER BODY CLOTHING: A LOT
STANDING UP FROM CHAIR USING ARMS: A LITTLE
MOBILITY SCORE: 19
STANDING UP FROM CHAIR USING ARMS: A LITTLE
TOILETING: A LITTLE
DAILY ACTIVITIY SCORE: 22
MOBILITY SCORE: 24
HELP NEEDED FOR BATHING: A LITTLE
TOILETING: A LITTLE
PERSONAL GROOMING: A LITTLE
MOBILITY SCORE: 20
DRESSING REGULAR UPPER BODY CLOTHING: A LITTLE
DAILY ACTIVITIY SCORE: 17
MOVING TO AND FROM BED TO CHAIR: A LITTLE
HELP NEEDED FOR BATHING: A LOT
MOVING TO AND FROM BED TO CHAIR: A LITTLE
TURNING FROM BACK TO SIDE WHILE IN FLAT BAD: A LITTLE
DAILY ACTIVITIY SCORE: 24

## 2024-09-19 ASSESSMENT — ENCOUNTER SYMPTOMS
SHORTNESS OF BREATH: 1
ENDOCRINE NEGATIVE: 1
CARDIOVASCULAR NEGATIVE: 1
PSYCHIATRIC NEGATIVE: 1
CONSTITUTIONAL NEGATIVE: 1
GASTROINTESTINAL NEGATIVE: 1
COUGH: 1
EYES NEGATIVE: 1
MUSCULOSKELETAL NEGATIVE: 1
ALLERGIC/IMMUNOLOGIC NEGATIVE: 1
HEMATOLOGIC/LYMPHATIC NEGATIVE: 1
NEUROLOGICAL NEGATIVE: 1

## 2024-09-19 ASSESSMENT — ACTIVITIES OF DAILY LIVING (ADL)
ADL_ASSISTANCE: INDEPENDENT
ADL_ASSISTANCE: INDEPENDENT
BATHING_ASSISTANCE: MODERATE

## 2024-09-19 ASSESSMENT — PAIN SCALES - GENERAL
PAINLEVEL_OUTOF10: 0 - NO PAIN

## 2024-09-19 ASSESSMENT — PAIN - FUNCTIONAL ASSESSMENT
PAIN_FUNCTIONAL_ASSESSMENT: 0-10
PAIN_FUNCTIONAL_ASSESSMENT: 0-10

## 2024-09-19 NOTE — PROGRESS NOTES
Occupational Therapy    Evaluation    Patient Name: Jose Martin Fleming  MRN: [id number]  Department: [address]  Room: [address] [place] [address]  Today's Date: 9/19/2024  Time Calculation  Start Time: 1357  Stop Time: 1412  Time Calculation (min): 15 min    Assessment  IP OT Assessment  OT Assessment: Pt is 88 y/o male admitted for supratherapeutic INR. Pt presents w/ decreased ADL skills/ functional mobility, impaired balance, decreased activity tolerance, decreased safety awareness, impulsivity. REcommend OT services to address above deficits.  Prognosis: Good  Barriers to Discharge:  (decreased safety awareness)  Evaluation/Treatment Tolerance: Patient tolerated treatment well  Medical Staff Made Aware: Yes  End of Session Communication: Bedside nurse  End of Session Patient Position: Up in chair, Alarm on  Plan:  Treatment Interventions: ADL retraining, Functional transfer training, Endurance training, Patient/family training, Equipment evaluation/education, Compensatory technique education  OT Frequency: 3 times per week  OT Discharge Recommendations: Low intensity level of continued care  Equipment Recommended upon Discharge: Wheeled walker  OT - OK to Discharge: Yes    Subjective   Current Problem:  1. Supratherapeutic INR        2. Acute hypoxic respiratory failure (Multi)        3. Acute pulmonary edema (Multi)          General:  General  Reason for Referral: decreased ADL's  Referred By: Dr Jones  Past Medical History Relevant to Rehab: anemia, aortic stenosis, AVR '88, CVA, a-fib, HTN, bilat KOBY, CHF, ex smoker  Family/Caregiver Present: No  Prior to Session Communication: Bedside nurse  Patient Position Received: Up in chair, Alarm on  Preferred Learning Style: verbal, visual  General Comment: Cleared to see for eval, pt agreeable to therapy  Precautions:  Hearing/Visual Limitations: mildly [place], reading glasses  Medical Precautions: Fall precautions, Oxygen therapy device and L/min (3L O2)    Vital Sign (Past 2hrs)         Date/Time Vitals Session Patient Position Pulse Resp SpO2 BP MAP (mmHg)    09/19/24 1322 During PT  Lying  58  --  92 %  --  --                        Pain:  Pain Assessment  Pain Assessment: 0-10  0-10 (Numeric) Pain Score: 0 - No pain    Objective   Cognition:  Overall Cognitive Status: Within Functional Limits  Orientation Level: Oriented X4  Cognition Comments: pleasant , cooperative  Safety/Judgement:  (decreased)  Insight: Mild  Impulsive: Mildly           Home Living:  Type of Home: House  Lives With: Spouse  Home Adaptive Equipment: Walker rolling or standard, Cane, Reacher  Home Layout: One level  Home Access: Stairs to enter without rails  Entrance Stairs-Rails: None  Entrance Stairs-Number of Steps: 1  Bathroom Shower/Tub: Walk-in shower, Tub/shower unit  Bathroom Toilet: Standard, Handicapped height  Bathroom Equipment: Built-in shower seat, Grab bars in shower  Home Living Comments: Spouse is home , able to assist as needed   Prior Function:  Level of [place]: Independent with ADLs and functional transfers, Independent with homemaking with ambulation  Receives Help From: Family  ADL Assistance: Independent  Homemaking Assistance: Independent (shares w/ spouse, does have a cleaning service)  Ambulatory Assistance: Independent (uses can occasionally)  Vocational: Retired  Leisure: likes to garden  Hand Dominance: Right  IADL History:     ADL:  Eating Assistance: Stand by  Eating Deficit: Setup (per clinical judgement)  Grooming Assistance: Stand by  Grooming Deficit: Supervision/safety (washing, hands, brushing teeth standing sinkside)  Bathing Assistance: Moderate  Bathing Deficit: Steadying, Supervision/safety, Buttocks, Left lower leg including foot, Right lower leg including foot (per cliical judgement)  UE Dressing Assistance: Stand by  UE Dressing Deficit: Setup, Supervision/safety (per clinical judgement)  LE Dressing Assistance: Moderate  LE Dressing Deficit: Supervision/safety, Steadying,  Don/doff R sock, Don/doff L sock, Thread RLE into pants, Thread LLE into pants, Thread RLE into underwear, Thread LLE into underwear (per clinical judgement)  Toileting Assistance with Device: Stand by  Toileting Deficit: Supervison/safety, Steadying, Clothing management up, Clothing management down  Functional Assistance: Stand by  Functional Deficit: Supervision/safety, Toilet transfer  Activity Tolerance:  Endurance: Tolerates less than 10 min exercise with changes in vital signs  Activity Tolerance Comments: decreased (increased SOB w/ activity)  Bed Mobility/Transfers: Transfers  Transfer: Yes  Transfer 1  Transfer From 1: Chair with arms to  Transfer to 1: Stand  Technique 1: Sit to stand, Stand to sit  Transfer Device 1: Walker (w/o AD.)  Transfer Level of Assistance 1: Close supervision  Trials/Comments 1: x2 trials to/ from chair w/ arms  Transfers 2  Transfer From 2: Stand to  Transfer to 2: Sit, Toilet  Technique 2: Stand to sit, Sit to stand  Transfer Device 2: Walker  Transfer Level of Assistance 2: Close supervision      Functional Mobility:  Functional Mobility  Functional Mobility Performed: Yes  Functional Mobility 1  Surface 1: Level tile  Device 1: Rolling walker (no device)  Assistance 1: Contact guard, Minimum assistance  Comments 1: x2 trials. First trial Pt ambulated from chair to bathroom and back w/ FWW and CGA, 2nd trial chair to bathroom and back to chair w/ min. assist and no AD. Pt much more unsteady w/o AD.  Modalities:     IADL's:      Vision: Vision - Basic Assessment  Current Vision: Wears glasses only for reading  Sensation:  Sensation Comment: Pt reports numbness, tingling in fingertips of Lt >rt hand from previous stroke  Strength:  Strength Comments: BUE's 4/5  Perception:     Coordination:  Movements are Fluid and Coordinated: Yes   Hand Function:  Hand Function  Gross Grasp: Functional  Coordination: Functional  Extremities: RUE   RUE : Within Functional Limits and LUE   LUE:  Within Functional Limits      Outcome Measures: [facility] Daily Activity  Putting on and taking off regular lower body clothing: A lot  Bathing (including washing, rinsing, drying): A lot  Putting on and taking off regular upper body clothing: A little  Toileting, which includes using toilet, bedpan or urinal: A little  Taking care of personal grooming such as brushing teeth: A little  Eating Meals: None  Daily Activity - Total Score: 17      Education Documentation  ADL Training, taught by Melina Lucero OT at 9/19/2024  2:48 PM.  Learner: Patient  Readiness: Acceptance  Method: Explanation  Response: Demonstrated Understanding, Needs Reinforcement    Education Comments  No comments found.      Goals:   Encounter Problems       Encounter Problems (Active)       OT Goals       ADL's (Progressing)       Start:  09/19/24    Expected End:  10/03/24       Pt will complete ADL tasks w/ Mod I w/ AE/ compensatory tech for safety and increased independence in self care tasks.         Functional transfers (Progressing)       Start:  09/19/24    Expected End:  10/03/24       Pt will demon bed, chair and toilet transfers w/ Mod I w/ LRD, elev seat heights using B arm supports for safety and increased independence in daily tasks.          Functional endurance (Progressing)       Start:  09/19/24    Expected End:  10/03/24       Pt will tolerate 20 minutes of functional activity to improve functional endurance for safety and increased independence in daily tasks and functional mobility.

## 2024-09-19 NOTE — CARE PLAN
The patient's goals for the shift include      The clinical goals for the shift include monitor labs, remain free from falls      Problem: Fall/Injury  Goal: Not fall by end of shift  Outcome: Progressing  Goal: Be free from injury by end of the shift  Outcome: Progressing  Goal: Verbalize understanding of personal risk factors for fall in the hospital  Outcome: Progressing  Goal: Verbalize understanding of risk factor reduction measures to prevent injury from fall in the home  Outcome: Progressing  Goal: Use assistive devices by end of the shift  Outcome: Progressing  Goal: Pace activities to prevent fatigue by end of the shift  Outcome: Progressing

## 2024-09-19 NOTE — PROGRESS NOTES
Spiritual Care Visit    Clinical Encounter Type  Visited With: Patient  Routine Visit: Introduction  Continue Visiting: Yes         Values/Beliefs  Spiritual Requests During Hospitalization: Nick was anointed today by Fr Alfonso. Great conversation with sushil.    Sacramental Encounters  Sacrament of Sick-Anointing: Anointed                             Taxonomy  Intended Effects: Build relationship of care and support, Demonstrate caring and concern    Matty Nichols

## 2024-09-19 NOTE — CARE PLAN
Problem: Fall/Injury  Goal: Not fall by end of shift  Outcome: Progressing  Goal: Be free from injury by end of the shift  Outcome: Progressing  Goal: Verbalize understanding of personal risk factors for fall in the hospital  Outcome: Progressing  Goal: Verbalize understanding of risk factor reduction measures to prevent injury from fall in the home  Outcome: Progressing  Goal: Use assistive devices by end of the shift  Outcome: Progressing  Goal: Pace activities to prevent fatigue by end of the shift  Outcome: Progressing   The patient's goals for the shift include      The clinical goals for the shift include monitor labs, remain free from falls    Over the shift, the patient did not make progress toward the following goals. Barriers to progression include . Recommendations to address these barriers include .

## 2024-09-19 NOTE — PROGRESS NOTES
09/19/24 1340   Discharge Planning   Living Arrangements Spouse/significant other   Support Systems Spouse/significant other;Home care staff  ([facility] - care staff)   Assistance Needed independent - cane prn   Type of Residence Private residence   Number of Stairs to Enter Residence 1   Number of Stairs Within Residence 0   Do you have animals or pets at home? No   Who is requesting discharge planning? Provider   Home or Post Acute Services None   Expected Discharge Disposition Home   Does the patient need discharge transport arranged? No     Home no needs.  Continues on oxygen at this time.

## 2024-09-19 NOTE — CONSULTS
Inpatient consult to Pulmonology  Consult performed by: TIMOTHY Paz-CNP  Consult ordered by: Augusto Jones DO  Reason for consult: Hypoxia and hemoptysis          Reason For Consult  Hypoxia and hemoptysis     History Of Present Illness  Jose Martin Fleming is a 89 y.o. male past medical history that includes but is not limited to congestive heart failure, atrial fibrillation; on Coumadin who presented to [facility] Emergency Department yesterday for evaluation of hemoptysis and weakness for the past 3 days.  Reportedly the patient follows with the Coumadin Clinic told to hold his Coumadin for 3 days due to supratherapeutic levels which he did and it was still high on recheck.  He was hypoxic upon arrival by: 81% on room air and placed on 4 L nasal cannula oxygen to maintain O2 sats greater than or equal to 92%.     Past Medical History  Past Medical History:   Diagnosis Date    Anemia 09/02/2023    Aortic stenosis 09/02/2023    Aortic valve disorder 09/02/2023    At risk for bleeding 09/02/2023    Atrial fibrillation (Multi) 09/02/2023    Cerebrovascular accident (CVA) (Multi) 09/02/2023    History of aortic valve replacement 09/02/2023    Hypercholesterolemia 09/02/2023    Hypertension 09/02/2023    Long term (current) use of anticoagulants 09/25/2023    Personal history of other diseases of the circulatory system 07/18/2014    History of aortic valve disorder    Personal history of other diseases of the nervous system and sense organs     History of cataract    Prediabetes 09/02/2023    Preglaucoma, unspecified, unspecified eye     Glaucoma suspect       Surgical History  Past Surgical History:   Procedure Laterality Date    ANKLE FRACTURE SURGERY  1989    APPENDECTOMY  10/08/2013    Appendectomy    COLONOSCOPY  2010    IR EMBOLIZATION  09/09/2021    IR EMBOLIZATION [facility]    MECHANICAL AORTIC VALVE REPLACEMENT  1985    [facility]    TOTAL HIP ARTHROPLASTY Left 1999    TOTAL HIP  ARTHROPLASTY Right 2005        Social History  Social History     Tobacco Use    Smoking status: Former     Current packs/day: 1.00     Average packs/day: 1 pack/day for 53.0 years (53.0 ttl pk-yrs)     Types: Cigarettes    Smokeless tobacco: Never   Substance Use Topics    Alcohol use: Not Currently    Drug use: Never       Family History  Family History   Problem Relation Name Age of Onset    Heart attack Father      Sudden death Father          Cardiac    Diabetes Brother            Allergies  Rosuvastatin    Review of Systems   Constitutional: Negative.    HENT: Negative.     Eyes: Negative.    Respiratory:  Positive for cough and shortness of breath.         Hemoptysis.   Cardiovascular: Negative.    Gastrointestinal: Negative.    Endocrine: Negative.    Genitourinary: Negative.    Musculoskeletal: Negative.    Allergic/Immunologic: Negative.    Neurological: Negative.    Hematological: Negative.    Psychiatric/Behavioral: Negative.          Physical Exam  Vitals and nursing note reviewed.   Constitutional:       Appearance: Normal appearance.   HENT:      Head: Normocephalic and atraumatic.      Nose: Nose normal.      Mouth/Throat:      Mouth: Mucous membranes are moist.   Eyes:      Extraocular Movements: Extraocular movements intact.      Conjunctiva/sclera: Conjunctivae normal.      Pupils: Pupils are equal, round, and reactive to light.   Cardiovascular:      Rate and Rhythm: Normal rate and regular rhythm.      Pulses: Normal pulses.      Heart sounds: Normal heart sounds.   Pulmonary:      Effort: Pulmonary effort is normal.      Breath sounds: Normal breath sounds.   Abdominal:      General: Bowel sounds are normal.      Palpations: Abdomen is soft.   Musculoskeletal:         General: Normal range of motion.   Skin:     General: Skin is warm and dry.      Capillary Refill: Capillary refill takes less than 2 seconds.   Neurological:      General: No focal deficit present.      Mental Status: He is alert  "and oriented to person, place, and time.   Psychiatric:         Mood and Affect: Mood normal.         Behavior: Behavior normal.          Vital Signs  Blood pressure 136/69, pulse 62, temperature 36.4 °C (97.6 °F), temperature source Temporal, resp. rate 18, height 1.753 m (5' 9\"), weight 68.1 kg (150 lb 2.1 oz), SpO2 92%.  Oxygen Therapy  SpO2: 92 %  Medical Gas Therapy: Supplemental oxygen  Medical Gas Delivery Method: Nasal cannula (3L NC)       Intake/Output Summary (Last 24 hours) at 9/19/2024 0937  Last data filed at 9/19/2024 0838  Gross per 24 hour   Intake 415 ml   Output 700 ml   Net -285 ml      Scheduled medications:  colesevelam, 625 mg, oral, Daily with lunch  losartan, 100 mg, oral, Daily  metoprolol succinate XL, 50 mg, oral, Daily  multivitamin with minerals, 1 tablet, oral, Daily     PRN medications: acetaminophen **OR** acetaminophen **OR** acetaminophen, ondansetron ODT **OR** ondansetron       Relevant Results  Results for orders placed or performed during the hospital encounter of 09/18/24 (from the past 24 hour(s))   CBC and Auto Differential   Result Value Ref Range    WBC 9.7 4.4 - 11.3 x10*3/uL    nRBC 0.0 0.0 - 0.0 /100 WBCs    RBC 4.30 (L) 4.50 - 5.90 x10*6/uL    Hemoglobin 10.0 (L) 13.5 - 17.5 g/dL    Hematocrit 33.7 (L) 41.0 - 52.0 %    MCV 78 (L) 80 - 100 fL    MCH 23.3 (L) 26.0 - 34.0 pg    MCHC 29.7 (L) 32.0 - 36.0 g/dL    RDW 21.2 (H) 11.5 - 14.5 %    Platelets 502 (H) 150 - 450 x10*3/uL    Neutrophils % 82.6 40.0 - 80.0 %    Immature Granulocytes %, Automated 0.3 0.0 - 0.9 %    Lymphocytes % 6.5 13.0 - 44.0 %    Monocytes % 10.0 2.0 - 10.0 %    Eosinophils % 0.3 0.0 - 6.0 %    Basophils % 0.3 0.0 - 2.0 %    Neutrophils Absolute 7.98 (H) 1.60 - 5.50 x10*3/uL    Immature Granulocytes Absolute, Automated 0.03 0.00 - 0.50 x10*3/uL    Lymphocytes Absolute 0.63 (L) 0.80 - 3.00 x10*3/uL    Monocytes Absolute 0.97 (H) 0.05 - 0.80 x10*3/uL    Eosinophils Absolute 0.03 0.00 - 0.40 x10*3/uL "    Basophils Absolute 0.03 0.00 - 0.10 x10*3/uL   Comprehensive metabolic panel   Result Value Ref Range    Glucose 126 (H) 74 - 99 mg/dL    Sodium 141 136 - 145 mmol/L    Potassium 4.3 3.5 - 5.3 mmol/L    Chloride 109 (H) 98 - 107 mmol/L    Bicarbonate 24 21 - 32 mmol/L    Anion Gap 12 10 - 20 mmol/L    Urea Nitrogen 36 (H) 6 - 23 mg/dL    Creatinine 1.54 (H) 0.50 - 1.30 mg/dL    eGFR 43 (L) >60 mL/min/1.73m*2    Calcium 9.0 8.6 - 10.3 mg/dL    Albumin 3.6 3.4 - 5.0 g/dL    Alkaline Phosphatase 66 33 - 136 U/L    Total Protein 7.2 6.4 - 8.2 g/dL    AST 24 9 - 39 U/L    Bilirubin, Total 0.8 0.0 - 1.2 mg/dL    ALT 21 10 - 52 U/L   Protime-INR   Result Value Ref Range    Protime 75.8 (HH) 9.3 - 12.7 seconds    INR >8.0 (HH) 0.9 - 1.2   B-type natriuretic peptide   Result Value Ref Range    BNP 2,109 (H) 0 - 99 pg/mL   Morphology   Result Value Ref Range    RBC Morphology See Below     Polychromasia Mild     Hypochromia Mild     RBC Fragments Few     Ovalocytes Few    TSH   Result Value Ref Range    Thyroid Stimulating Hormone 2.96 0.44 - 3.98 mIU/L   T4, free   Result Value Ref Range    Thyroxine, Free 1.44 (H) 0.61 - 1.12 ng/dL   Sars-CoV-2 PCR   Result Value Ref Range    Coronavirus 2019, PCR Not Detected Not Detected   Influenza A, and B PCR   Result Value Ref Range    Flu A Result Not Detected Not Detected    Flu B Result Not Detected Not Detected   ECG 12 lead   Result Value Ref Range    Ventricular Rate 56 BPM    Atrial Rate 340 BPM    QRS Duration 166 ms    QT Interval 508 ms    QTC Calculation(Bazett) 490 ms    R Axis -41 degrees    T Axis 110 degrees    QRS Count 9 beats    Q Onset 206 ms    P Onset 130 ms    P Offset 176 ms    T Offset 460 ms    QTC Fredericia 496 ms   Protime-INR   Result Value Ref Range    Protime 24.9 (H) 9.3 - 12.7 seconds    INR 2.6 (H) 0.9 - 1.2   CBC   Result Value Ref Range    WBC 9.1 4.4 - 11.3 x10*3/uL    nRBC 0.0 0.0 - 0.0 /100 WBCs    RBC 3.95 (L) 4.50 - 5.90 x10*6/uL     Hemoglobin 9.2 (L) 13.5 - 17.5 g/dL    Hematocrit 31.2 (L) 41.0 - 52.0 %    MCV 79 (L) 80 - 100 fL    MCH 23.3 (L) 26.0 - 34.0 pg    MCHC 29.5 (L) 32.0 - 36.0 g/dL    RDW 20.7 (H) 11.5 - 14.5 %    Platelets 448 150 - 450 x10*3/uL   Basic metabolic panel   Result Value Ref Range    Glucose 70 (L) 74 - 99 mg/dL    Sodium 142 136 - 145 mmol/L    Potassium 3.9 3.5 - 5.3 mmol/L    Chloride 108 (H) 98 - 107 mmol/L    Bicarbonate 27 21 - 32 mmol/L    Anion Gap 11 10 - 20 mmol/L    Urea Nitrogen 36 (H) 6 - 23 mg/dL    Creatinine 1.62 (H) 0.50 - 1.30 mg/dL    eGFR 40 (L) >60 mL/min/1.73m*2    Calcium 8.4 (L) 8.6 - 10.3 mg/dL      XR chest 2 views  Result Date: 9/18/2024  FINDINGS: CARDIOMEDIASTINAL SILHOUETTE AND VASCULATURE:   Cardiac size:  Upper limits of normal, status post median sternotomy. Aortic shadow:  Within normal limits.   Mediastinal contours: Within normal limits.   Pulmonary vasculature:  The central vasculature is unremarkable   LUNGS: There is interstitial prominence particular in the upper lobes since the previous exam. This could be due to development of fibrosis, but interstitial infiltrate from pneumonia and/or congestion would be suspected. There is additional opacity at the right base probably from atelectasis or focal infiltrate.   ABDOMEN AND OTHER FINDINGS: No remarkable upper abdominal findings.   BONES: No acute osseous changes.  1.  Probable upper lobe infiltrates from pneumonia and/or congestion.        Assessment/Plan   Acute hypoxic respiratory failure  Wean oxygen as sats allow  Continuous pulse oximetry  Incentive spirometry/pulmonary hygiene    Hemoptysis  One episode the a.m. for moderate amount of bright-red blood per patient-he did not save  CT scan chest without contrast pending    Supratherapeutic INR  Resolving  Coumadin on hold    Acute on chronic systolic congestive heart failure  40 mg IV Lasix given x 1 dose yesterday  40 mg oral Lasix daily   Cardiology consulted    Ex tobacco  use  > 50 pack/year history    Yi Meyers, APRN-CNP

## 2024-09-19 NOTE — PROGRESS NOTES
Physical Therapy    Physical Therapy Evaluation & Treatment    Patient Name: Jose Martin Fleming  MRN: [id number]  Department: [address]  Room: [address] [place], [place] [address]  Today's Date: 9/19/2024   Time Calculation  Start Time: 1322  Stop Time: 1352  Time Calculation (min): 30 min    Assessment/Plan   PT Assessment  PT Assessment Results: Decreased strength, Decreased range of motion, Decreased endurance, Impaired balance, Decreased mobility, Decreased coordination, Impaired judgement, Decreased safety awareness, Impaired hearing, Pain  Rehab Prognosis: Good  Barriers to Discharge: none (needs additional safety precautions in place (i.e. consistent and safe use of RW))  Evaluation/Treatment Tolerance: Other (Comment), Patient limited by fatigue (limited by SOB)  Medical Staff Made Aware: Yes  Strengths: Attitude of self, Support of Caregivers, Rehab experience  Barriers to Participation: Comorbidities  End of Session Communication: Bedside nurse  Assessment Comment: Pt is pleasant and cooperative, effort but  demonstrates decreased activity tolerance and balance as well as insight to safe mobility w/ h/o falls. Pt would benefit from continued PT serivces to promote dafe functional mobility, strength, balance and endurance.  End of Session Patient Position: Up in chair, Alarm on   IP OR SWING BED PT PLAN  Inpatient or Swing Bed: Inpatient  PT Plan  Treatment/Interventions: Transfer training, Bed mobility, Gait training, Stair training, Balance training, Strengthening, Endurance training, Therapeutic exercise, Therapeutic activity  PT Plan: Ongoing PT  PT Frequency: 4 times per week  PT Discharge Recommendations: Low intensity level of continued care, Other (comment) (use of RW ~ reports will need to retrieve it from the attic.)  Equipment Recommended upon Discharge: Wheeled walker  PT Recommended Transfer Status: Assist x1, Assistive device  PT - OK to Discharge: Yes      Subjective     General Visit Information:  General  Reason for  "Referral: Impaired mobility, supratherapeutic INR  Referred By: Dr Jones  Past Medical History Relevant to Rehab: anemia, aortic stenosis, AVR '88, CVA, a-fib, HTN, bilat KOBY, CHF, ex smoker  Family/Caregiver Present: No  Prior to Session Communication: Bedside nurse  Patient Position Received: Bed, 3 rail up, Alarm off, not on at start of session  Preferred Learning Style: verbal, visual  General Comment: Pt is an 89 y.o. male adm for hemoptysis x 3 days, dyspnea; found to have elevated INR > 8.  Pt agreeable to PT eval.  Home Living:  Home Living  Type of Home: House  Lives With: Spouse  Home Adaptive Equipment: Cane, Walker rolling or standard, Reacher (2WW in attic)  Home Layout: One level  Home Access: Stairs to enter without rails  Entrance Stairs-Rails: None  Entrance Stairs-Number of Steps: 1  Bathroom Shower/Tub: Walk-in shower  Bathroom Toilet: Standard, Handicapped height  Bathroom Equipment: Built-in shower seat, Grab bars in shower  Home Living Comments: spouse home, able to assist some  Prior Level of Function:  Prior Function Per Pt/Caregiver Report  Level of [place]: Independent with ADLs and functional transfers, Independent with homemaking with ambulation  Receives Help From: Family  ADL Assistance: Independent  Homemaking Assistance: Independent (shares w/ spouse; 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 Pt states that he drives, does not use O2 at baseline.States that he does PT HEP daily.  Precautions:  Precautions  Hearing/Visual Limitations: decreased hearing, reading glasses  Medical Precautions: Fall precautions, Oxygen therapy device and L/min (3L O2)    Vital Signs (Past 2hrs)        Date/Time Vitals Session Patient Position Pulse Resp SpO2 BP MAP (mmHg)    09/19/24 1322 During PT  Lying  58  -- "  92 %  --  --                   Vital Signs Comment: pt w/ cold hands; questionable accuracy of SpO2 readings, but likely upper 80's to low 90's; increases pursed lip breathing.    Objective   Pain:  Pain Assessment  Pain Assessment: 0-10  0-10 (Numeric) Pain Score: 0 - No pain (occasional Rt groin pain, ~2/10; pt reports that he strained it recently)  Cognition:  Cognition  Overall Cognitive Status: Within Functional Limits  Cognition Comments: Pleasant and cooperative.  Safety/Judgement:  (slightly decreased)  Insight: Mild  Impulsive: Mildly    General Assessments:  Activity Tolerance  Endurance: Tolerates less than 10 min exercise with changes in vital signs (mild SOB on 3 L O2, SpO2 mid/upper 80's w/ questionable accuracy of monitor, pt w/ cold hands)  Activity Tolerance Comments: decreased (limited by Rt groin, mild SOB)    Sensation  Sensation Comment: pt denied abn sensation    Strength  Strength Comments: BLEs 4- to 4/5, Rt hip at least 3/5 (NT w/ resistance)  Coordination  Coordination Comment: pt a little unsteady on feet, tends to reach out for supports    Dynamic Standing Balance  Dynamic Standing-Balance Support: Bilateral upper extremity supported, No upper extremity supported  Dynamic Standing-Level of Assistance: Contact guard  Dynamic Standing-Balance: Turning (amb)  Dynamic Standing-Comments: Fair- (pt tends to reach out for external supports)  Functional Assessments:  Bed Mobility  Bed Mobility: Yes  Bed Mobility 1  Bed Mobility 1: Supine to sitting  Level of Assistance 1: Close supervision  Bed Mobility Comments 1: to Lt EOB, HOB elevated part way.    Transfers  Transfer: Yes  Transfer 1  Technique 1: Sit to stand, Stand to sit  Transfer Device 1: Walker (or none)  Transfer Level of Assistance 1: Close supervision  Trials/Comments 1: from EOB, to/from couch w/o supports, to chair w/ arms    Ambulation/Gait Training  Ambulation/Gait Training Performed: Yes  Ambulation/Gait Training 1  Surface 1:  Level tile  Device 1: No device, Rolling walker  Assistance 1: Contact guard  Comments/Distance (ft) 1: Pt amb ~ 10' x 1 w/ RW, 20' x 1 w/o AD per pt request. Pt amb w/ short step length, slightly guarded w/ mild unsteadiness but no LOB. Pt tends to reach out for external supports when not use RW. Pt w/ mild SOB on 3L O2, SpO2 likley mid/upper 80's, however, questionable accuracy, pt w/ cold hands.  Extremity/Trunk Assessments:  RLE   RLE : Within Functional Limits (some discomfort Rt groin w/ flexion)  LLE   LLE : Within Functional Limits  Treatments:  Ambulation/Gait Training  Ambulation/Gait Training Performed: Yes  Ambulation/Gait Training 1  Surface 1: Level tile  Device 1: No device, Rolling walker  Assistance 1: Contact guard  Comments/Distance (ft) 1: Pt amb ~ 10' x 1 w/ RW, 20' x 1 w/o AD per pt request. Pt amb w/ short step length, slightly guarded w/ mild unsteadiness but no LOB. Pt tends to reach out for external supports when not use RW. Pt w/ mild SOB on 3L O2, SpO2 likley mid/upper 80's, however, questionable accuracy, pt w/ cold hands.  Outcome Measures:  [facility] Basic Mobility  Turning from your back to your side while in a flat bed without using bedrails: None  Moving from lying on your back to sitting on the side of a flat bed without using bedrails: A little  Moving to and from bed to chair (including a wheelchair): A little  Standing up from a chair using your arms (e.g. wheelchair or bedside chair): A little  To walk in hospital room: A little  Climbing 3-5 steps with railing: A little  Basic Mobility - Total Score: 19    Encounter Problems       Encounter Problems (Active)       Balance       LTG - Patient will maintain balance to allow for safe mobility (Progressing)       Start:  09/19/24    Expected End:  09/27/24               Mobility       STG - Patient will ambulate 100' w/ RW and distant supervision  (Progressing)       Start:  09/19/24    Expected End:  09/27/24            STG - Patient  will ambulate up and down a curb/step w/ close supervision  (Progressing)       Start:  09/19/24    Expected End:  09/27/24            Pt will tolerate BLE exercises to promote functional strength, balance and endurance (Progressing)       Start:  09/19/24    Expected End:  09/27/24               PT Transfers       STG - Patient to transfer to and from sit to supine IND (Progressing)       Start:  09/19/24    Expected End:  09/27/24            STG - Patient will transfer sit to and from stand MOD I  (Progressing)       Start:  09/19/24    Expected End:  09/27/24                   Education Documentation  Precautions, taught by Sis Thompson, PT at 9/19/2024  2:35 PM.  Learner: Patient  Readiness: Acceptance  Method: Explanation  Response: Verbalizes Understanding, Needs Reinforcement  Comment: safety precautions    Mobility Training, taught by Sis Thompson, PT at 9/19/2024  2:35 PM.  Learner: Patient  Readiness: Acceptance  Method: Explanation  Response: Verbalizes Understanding, Needs Reinforcement  Comment: safety precautions    Education Comments  No comments found.

## 2024-09-19 NOTE — PROGRESS NOTES
"  Subjective    Patient reports breathing better today.  He reports coughing up some blood last night since admission.  He denies chest pain, black, bloody stools, nausea, vomiting or diarrhea.    Objective    Vitals  Visit Vitals  BP (!) 104/45 (BP Location: Right arm, Patient Position: Sitting)   Pulse 58   Temp 36.5 °C (97.7 °F) (Temporal)   Resp 16   Ht 1.753 m (5' 9\") Comment: Simultaneous filing. User may not have seen previous data.   Wt 68.1 kg (150 lb 2.1 oz) Comment: Simultaneous filing. User may not have seen previous data.   SpO2 92% Comment: variable from mid 80's to mid 90's, typically ~ 89-91%   BMI 22.17 kg/m²   Smoking Status Former   BSA 1.82 m²       Physical Exam   Physical Exam   General: Alert. NAD.   HEENT: Sclera clear  CVS: RRR.   Lungs: CTAB.  Abdomen: Soft. Nontender. Bowel sounds present.   Extremities: No pitting edema bilateral lower extremities.  Psychiatric: Cooperative.  IOs    Intake/Output Summary (Last 24 hours) at 9/19/2024 1523  Last data filed at 9/19/2024 0953  Gross per 24 hour   Intake 815 ml   Output 700 ml   Net 115 ml       Labs:   Results from last 72 hours   Lab Units 09/19/24  0438 09/18/24  1420   SODIUM mmol/L 142 141   POTASSIUM mmol/L 3.9 4.3   CHLORIDE mmol/L 108* 109*   CO2 mmol/L 27 24   BUN mg/dL 36* 36*   CREATININE mg/dL 1.62* 1.54*   GLUCOSE mg/dL 70* 126*   CALCIUM mg/dL 8.4* 9.0   ANION GAP mmol/L 11 12   EGFR mL/min/1.73m*2 40* 43*      Results from last 72 hours   Lab Units 09/19/24  0438 09/18/24  1420   WBC AUTO x10*3/uL 9.1 9.7   HEMOGLOBIN g/dL 9.2* 10.0*   HEMATOCRIT % 31.2* 33.7*   PLATELETS AUTO x10*3/uL 448 502*   NEUTROS PCT AUTO %  --  82.6   LYMPHS PCT AUTO %  --  6.5   MONOS PCT AUTO %  --  10.0   EOS PCT AUTO %  --  0.3      Lab Results   Component Value Date    CALCIUM 8.4 (L) 09/19/2024      No results found for: \"CRP\"   [unfilled]       Images  CT chest wo IV contrast  Narrative: Interpreted By:  Luis Valadez,   STUDY:  CT CHEST " WO IV CONTRAST; ;  9/19/2024 12:35 pm      INDICATION:  Signs/Symptoms:Hemoptysis.          COMPARISON:  12/26/2023      ACCESSION NUMBER(S):  [id number]      ORDERING CLINICIAN:  HAKEEM ARMIJO      TECHNIQUE:  Serial axial unenhanced CT images obtained of the chest. Images  reformatted in the coronal and sagittal projection      All CT examinations are performed with 1 or more of the following  dose reduction techniques: Automated exposure control, adjustment of  mA and/or kv according to patient's size, or use of iterative  reconstruction techniques.      FINDINGS:  Mediastinum demonstrates scattered lymphadenopathy within the  paratracheal and AP window locations as well as subcarinal location.  Right paratracheal lymphadenopathy identified measures 10 mm in the  short axis with findings slightly more conspicuous from prior  imaging. Subcarinal lymph node identified measures 11 mm in the short  axis. There is no hilar lymphadenopathy within limits of this  unenhanced CT. Esophagus is unremarkable      Heart and great vessels demonstrate ascending thoracic aorta to  measure 4.0 cm with dilatation as seen on prior imaging. There is  moderate vascular calcification of the thoracic aorta. Median  sternotomy wires in place with aortic valve replacement demonstrated.      Moderate right and small left basilar pleural effusions. There is  bilateral basilar compressive atelectasis. Mild fluid tracking along  the right major fissure. There is moderate centrilobular emphysema  with component of increased interstitial prominence. There is  component of geographic attenuation lung parenchyma with air  trapping/pulmonary edema. More focal infiltrate demonstrated in the  posterior segment of the right upper lobe.      Included portions visualized abdomen demonstrate exophytic cyst from  the superior pole of the left kidney posteriorly measuring 3.6 cm.  Cholelithiasis is demonstrated      Visualized osseous structures  demonstrate no compression deformity in  the spine. Mild multilevel anterior osteophyte formation demonstrated.                      Impression: 1. Moderate right and mild left basilar pleural effusions with  basilar compressive atelectasis. There is component of pulmonary  edema demonstrated.      2. Asymmetric infiltrate without airspace consolidation posterior  segment of the right upper lobe laterally with postinfectious  etiology not excluded      3. Moderate centrilobular emphysema.          MACRO:  None      Signed by: Luis Valadez 9/19/2024 1:37 PM  Dictation workstation:   [id number]  ECG 12 lead  Sinus bradycardia with occasional Premature ventricular complexes  Left axis deviation  Left bundle branch block  Abnormal ECG  When compared with ECG of 08-JAN-2024 11:07,  No significant change was found  Confirmed by Beni De Paz ([id number]) on 9/19/2024 12:26:45 PM      Meds  Scheduled medications  amiodarone, 200 mg, oral, Daily  colesevelam, 625 mg, oral, Daily with lunch  furosemide, 40 mg, oral, Daily  metoprolol succinate XL, 50 mg, oral, Daily  multivitamin with minerals, 1 tablet, oral, Daily  sacubitriL-valsartan, 1 tablet, oral, BID      Continuous medications     PRN medications  PRN medications: acetaminophen **OR** acetaminophen **OR** acetaminophen, ondansetron ODT **OR** ondansetron     Assessment and Plan    Jose Martin Fleming is a 89 y.o. male with past medical history of aortic stenosis status post aortic valve replacement, atrial fibrillation on Coumadin, CVA, hyperlipidemia, hypertension, who was admitted to the hospital with acute hypoxic respiratory failure due to pneumonia along with hemoptysis, supratherapeutic INR, possible CHF.    Acute hypoxic respiratory failure secondary to pneumonia and acute on chronic systolic CHF exacerbation along with a mopped assist  -CT scan today with concern for possible pneumonia along with moderate right and mild left basilar pleural effusions and  pulmonary edema.  -Obtain blood cultures, procalcitonin level, urine strep antigen Legionella antigen.  -Place on Rocephin, doxycycline (not giving Zithromax due to interaction with amiodarone prolonging QTc interval).  -On oral Lasix and his BP a little on the low side, we will hold off on giving further IV Lasix for now, but will consider IV Lasix if BP can tolerate at a later time.  -Cardiology consulted as well.  -Echo on 12/26/2023 with EF of 20 to 25% with normal pattern of left ventricular diastolic filling.  -Will discuss with cardiology if they want a repeat echo.  -Patient on 3 L of O2.  -1 episode of hemoptysis overnight.  Hemoglobin down to 9.2 today; 10 on 9/18 and hemoglobin was 13.3 on 12/29/2023.  -Monitor.     Supratherapeutic INR  -Status post vitamin K.  -Holding home med of Coumadin.  -INR 2.6 this morning.  Continue to monitor.    History of aortic stenosis status post valve replacement  -Holding Coumadin state above and will discuss with cardiology but might need heparin drip placed if INR drifts down much lower depending on hemoptysis and hemoglobin/hematocrit as well.    Acute anemia  -Possibly secondary to acute blood loss anemia from hemoptysis.  -Hemoglobin at 9.2 this morning; 10 on 9/18.  On review of the chart, hemoglobin was 13.3 on 12/29/2023 but had been lower back in 9/2021 where it was more in the 9-10 range.  -Monitor.    Atrial fibrillation  -Holding Coumadin due to supratherapeutic INR and hemoptysis.  -Continue amiodarone, metoprolol XL and monitor.    Acute on chronic systolic CHF exacerbation  -Treating as stated above.  -Cardiology consulted and will discuss if patient needs repeat echocardiogram.  -Continue Entresto, metoprolol XL.  -On oral Lasix at this time and may consider IV Lasix if BP will tolerate.    DVT prophylaxis  -SCD's and holding Coumadin with supratherapeutic INR.
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
## 2024-09-20 LAB
ALBUMIN SERPL BCP-MCNC: 3 G/DL (ref 3.4–5)
ANION GAP SERPL CALCULATED.3IONS-SCNC: 9 MMOL/L (ref 10–20)
BASOPHILS # BLD AUTO: 0.03 X10*3/UL (ref 0–0.1)
BASOPHILS NFR BLD AUTO: 0.3 %
BUN SERPL-MCNC: 30 MG/DL (ref 6–23)
BURR CELLS BLD QL SMEAR: NORMAL
CALCIUM SERPL-MCNC: 7.8 MG/DL (ref 8.6–10.3)
CHLORIDE SERPL-SCNC: 106 MMOL/L (ref 98–107)
CO2 SERPL-SCNC: 26 MMOL/L (ref 21–32)
CREAT SERPL-MCNC: 1.22 MG/DL (ref 0.5–1.3)
EGFRCR SERPLBLD CKD-EPI 2021: 57 ML/MIN/1.73M*2
EOSINOPHIL # BLD AUTO: 0.49 X10*3/UL (ref 0–0.4)
EOSINOPHIL NFR BLD AUTO: 5.2 %
ERYTHROCYTE [DISTWIDTH] IN BLOOD BY AUTOMATED COUNT: 20.5 % (ref 11.5–14.5)
GLUCOSE SERPL-MCNC: 86 MG/DL (ref 74–99)
HCT VFR BLD AUTO: 31.9 % (ref 41–52)
HGB BLD-MCNC: 9.5 G/DL (ref 13.5–17.5)
IMM GRANULOCYTES # BLD AUTO: 0.03 X10*3/UL (ref 0–0.5)
IMM GRANULOCYTES NFR BLD AUTO: 0.3 % (ref 0–0.9)
INR PPP: 1.5 (ref 0.9–1.2)
LEGIONELLA AG UR QL: NEGATIVE
LYMPHOCYTES # BLD AUTO: 0.84 X10*3/UL (ref 0.8–3)
LYMPHOCYTES NFR BLD AUTO: 8.9 %
MAGNESIUM SERPL-MCNC: 2.1 MG/DL (ref 1.6–2.4)
MCH RBC QN AUTO: 23.2 PG (ref 26–34)
MCHC RBC AUTO-ENTMCNC: 29.8 G/DL (ref 32–36)
MCV RBC AUTO: 78 FL (ref 80–100)
MONOCYTES # BLD AUTO: 1.19 X10*3/UL (ref 0.05–0.8)
MONOCYTES NFR BLD AUTO: 12.7 %
NEUTROPHILS # BLD AUTO: 6.82 X10*3/UL (ref 1.6–5.5)
NEUTROPHILS NFR BLD AUTO: 72.6 %
NRBC BLD-RTO: 0 /100 WBCS (ref 0–0)
OVALOCYTES BLD QL SMEAR: NORMAL
PHOSPHATE SERPL-MCNC: 3.3 MG/DL (ref 2.5–4.9)
PLATELET # BLD AUTO: 453 X10*3/UL (ref 150–450)
POTASSIUM SERPL-SCNC: 3.9 MMOL/L (ref 3.5–5.3)
PROCALCITONIN SERPL-MCNC: 0.16 NG/ML
PROTHROMBIN TIME: 15.2 SECONDS (ref 9.3–12.7)
RBC # BLD AUTO: 4.09 X10*6/UL (ref 4.5–5.9)
RBC MORPH BLD: NORMAL
S PNEUM AG UR QL: NEGATIVE
SODIUM SERPL-SCNC: 137 MMOL/L (ref 136–145)
WBC # BLD AUTO: 9.4 X10*3/UL (ref 4.4–11.3)

## 2024-09-20 PROCEDURE — 94640 AIRWAY INHALATION TREATMENT: CPT

## 2024-09-20 PROCEDURE — 83735 ASSAY OF MAGNESIUM: CPT | Performed by: INTERNAL MEDICINE

## 2024-09-20 PROCEDURE — 99232 SBSQ HOSP IP/OBS MODERATE 35: CPT | Performed by: NURSE PRACTITIONER

## 2024-09-20 PROCEDURE — 2500000005 HC RX 250 GENERAL PHARMACY W/O HCPCS: Performed by: INTERNAL MEDICINE

## 2024-09-20 PROCEDURE — 80069 RENAL FUNCTION PANEL: CPT | Performed by: INTERNAL MEDICINE

## 2024-09-20 PROCEDURE — 97530 THERAPEUTIC ACTIVITIES: CPT | Mod: GO,CO

## 2024-09-20 PROCEDURE — 99232 SBSQ HOSP IP/OBS MODERATE 35: CPT | Performed by: INTERNAL MEDICINE

## 2024-09-20 PROCEDURE — 2500000001 HC RX 250 WO HCPCS SELF ADMINISTERED DRUGS (ALT 637 FOR MEDICARE OP): Performed by: INTERNAL MEDICINE

## 2024-09-20 PROCEDURE — 2500000004 HC RX 250 GENERAL PHARMACY W/ HCPCS (ALT 636 FOR OP/ED): Performed by: INTERNAL MEDICINE

## 2024-09-20 PROCEDURE — 2500000002 HC RX 250 W HCPCS SELF ADMINISTERED DRUGS (ALT 637 FOR MEDICARE OP, ALT 636 FOR OP/ED): Performed by: INTERNAL MEDICINE

## 2024-09-20 PROCEDURE — 9420000001 HC RT PATIENT EDUCATION 5 MIN

## 2024-09-20 PROCEDURE — 1100000001 HC PRIVATE ROOM DAILY

## 2024-09-20 PROCEDURE — 97535 SELF CARE MNGMENT TRAINING: CPT | Mod: GO,CO

## 2024-09-20 PROCEDURE — 85610 PROTHROMBIN TIME: CPT | Performed by: INTERNAL MEDICINE

## 2024-09-20 PROCEDURE — 85025 COMPLETE CBC W/AUTO DIFF WBC: CPT | Performed by: INTERNAL MEDICINE

## 2024-09-20 PROCEDURE — 36415 COLL VENOUS BLD VENIPUNCTURE: CPT | Performed by: INTERNAL MEDICINE

## 2024-09-20 RX ORDER — WARFARIN 2.5 MG/1
2.5 TABLET ORAL DAILY
Status: DISCONTINUED | OUTPATIENT
Start: 2024-09-20 | End: 2024-09-21 | Stop reason: HOSPADM

## 2024-09-20 ASSESSMENT — ACTIVITIES OF DAILY LIVING (ADL)
BATHING_LEVEL_OF_ASSISTANCE: CLOSE SUPERVISION
BATHING_WHERE_ASSESSED: SITTING SINKSIDE;STANDING SINKSIDE
HOME_MANAGEMENT_TIME_ENTRY: 23

## 2024-09-20 ASSESSMENT — COGNITIVE AND FUNCTIONAL STATUS - GENERAL
TOILETING: A LITTLE
DRESSING REGULAR LOWER BODY CLOTHING: A LITTLE
TOILETING: A LITTLE
PERSONAL GROOMING: A LITTLE
DRESSING REGULAR LOWER BODY CLOTHING: A LITTLE
HELP NEEDED FOR BATHING: A LITTLE
DRESSING REGULAR UPPER BODY CLOTHING: A LITTLE
WALKING IN HOSPITAL ROOM: A LITTLE
STANDING UP FROM CHAIR USING ARMS: A LITTLE
MOBILITY SCORE: 19
TURNING FROM BACK TO SIDE WHILE IN FLAT BAD: A LITTLE
MOVING TO AND FROM BED TO CHAIR: A LITTLE
DAILY ACTIVITIY SCORE: 19
DRESSING REGULAR UPPER BODY CLOTHING: A LITTLE
CLIMB 3 TO 5 STEPS WITH RAILING: A LITTLE
DAILY ACTIVITIY SCORE: 20
HELP NEEDED FOR BATHING: A LITTLE

## 2024-09-20 ASSESSMENT — PAIN - FUNCTIONAL ASSESSMENT: PAIN_FUNCTIONAL_ASSESSMENT: 0-10

## 2024-09-20 ASSESSMENT — PAIN SCALES - GENERAL
PAINLEVEL_OUTOF10: 0 - NO PAIN

## 2024-09-20 NOTE — PROGRESS NOTES
"  Subjective    Patient reports breathing is better today.  He reports having 1 episode of slight dark sputum/bloody sputum yesterday.  Patient denies chest pain, nausea, vomiting or diarrhea.  Patient's wife is also at the bedside.    Objective    Vitals  Visit Vitals  /61   Pulse 70   Temp 36.4 °C (97.5 °F) (Temporal)   Resp 18   Ht 1.753 m (5' 9\") Comment: Simultaneous filing. User may not have seen previous data.   Wt 68.1 kg (150 lb 2.1 oz) Comment: Simultaneous filing. User may not have seen previous data.   SpO2 92%   BMI 22.17 kg/m²   Smoking Status Former   BSA 1.82 m²       Physical Exam   General: Alert.  No acute distress.  HEENT: Sclera clear  CVS: RRR.   Lungs: Diminished breath sounds bilateral bases.  Abdomen: Soft. NT. +BS.   Extremities: No pitting edema bilat lower extremities.  Psychiatric: Cooperative.     IOs    Intake/Output Summary (Last 24 hours) at 9/20/2024 1902  Last data filed at 9/20/2024 1537  Gross per 24 hour   Intake 800 ml   Output --   Net 800 ml       Labs:   Results from last 72 hours   Lab Units 09/20/24  0433 09/19/24  0438 09/18/24  1420   SODIUM mmol/L 137 142 141   POTASSIUM mmol/L 3.9 3.9 4.3   CHLORIDE mmol/L 106 108* 109*   CO2 mmol/L 26 27 24   BUN mg/dL 30* 36* 36*   CREATININE mg/dL 1.22 1.62* 1.54*   GLUCOSE mg/dL 86 70* 126*   CALCIUM mg/dL 7.8* 8.4* 9.0   ANION GAP mmol/L 9* 11 12   EGFR mL/min/1.73m*2 57* 40* 43*   PHOSPHORUS mg/dL 3.3  --   --       Results from last 72 hours   Lab Units 09/20/24  0433 09/19/24  0438 09/18/24  1420   WBC AUTO x10*3/uL 9.4 9.1 9.7   HEMOGLOBIN g/dL 9.5* 9.2* 10.0*   HEMATOCRIT % 31.9* 31.2* 33.7*   PLATELETS AUTO x10*3/uL 453* 448 502*   NEUTROS PCT AUTO % 72.6  --  82.6   LYMPHS PCT AUTO % 8.9  --  6.5   MONOS PCT AUTO % 12.7  --  10.0   EOS PCT AUTO % 5.2  --  0.3      Lab Results   Component Value Date    CALCIUM 7.8 (L) 09/20/2024    PHOS 3.3 09/20/2024      No results found for: \"CRP\"   [unfilled]       Images  CT " chest wo IV contrast  Narrative: Interpreted By:  Luis Valadez,   STUDY:  CT CHEST WO IV CONTRAST; ;  9/19/2024 12:35 pm      INDICATION:  Signs/Symptoms:Hemoptysis.          COMPARISON:  12/26/2023      ACCESSION NUMBER(S):  [id number]      ORDERING CLINICIAN:  HAKEEM ARMIJO      TECHNIQUE:  Serial axial unenhanced CT images obtained of the chest. Images  reformatted in the coronal and sagittal projection      All CT examinations are performed with 1 or more of the following  dose reduction techniques: Automated exposure control, adjustment of  mA and/or kv according to patient's size, or use of iterative  reconstruction techniques.      FINDINGS:  Mediastinum demonstrates scattered lymphadenopathy within the  paratracheal and AP window locations as well as subcarinal location.  Right paratracheal lymphadenopathy identified measures 10 mm in the  short axis with findings slightly more conspicuous from prior  imaging. Subcarinal lymph node identified measures 11 mm in the short  axis. There is no hilar lymphadenopathy within limits of this  unenhanced CT. Esophagus is unremarkable      Heart and great vessels demonstrate ascending thoracic aorta to  measure 4.0 cm with dilatation as seen on prior imaging. There is  moderate vascular calcification of the thoracic aorta. Median  sternotomy wires in place with aortic valve replacement demonstrated.      Moderate right and small left basilar pleural effusions. There is  bilateral basilar compressive atelectasis. Mild fluid tracking along  the right major fissure. There is moderate centrilobular emphysema  with component of increased interstitial prominence. There is  component of geographic attenuation lung parenchyma with air  trapping/pulmonary edema. More focal infiltrate demonstrated in the  posterior segment of the right upper lobe.      Included portions visualized abdomen demonstrate exophytic cyst from  the superior pole of the left kidney posteriorly  measuring 3.6 cm.  Cholelithiasis is demonstrated      Visualized osseous structures demonstrate no compression deformity in  the spine. Mild multilevel anterior osteophyte formation demonstrated.                      Impression: 1. Moderate right and mild left basilar pleural effusions with  basilar compressive atelectasis. There is component of pulmonary  edema demonstrated.      2. Asymmetric infiltrate without airspace consolidation posterior  segment of the right upper lobe laterally with postinfectious  etiology not excluded      3. Moderate centrilobular emphysema.          MACRO:  None      Signed by: Luis Valadez 9/19/2024 1:37 PM  Dictation workstation:   [id number]  ECG 12 lead  Sinus bradycardia with occasional Premature ventricular complexes  Left axis deviation  Left bundle branch block  Abnormal ECG  When compared with ECG of 08-JAN-2024 11:07,  No significant change was found  Confirmed by Beni De Paz ([id number]) on 9/19/2024 12:26:45 PM      Meds  Scheduled medications  amiodarone, 200 mg, oral, Daily  cefTRIAXone, 1 g, intravenous, q24h  colesevelam, 625 mg, oral, Daily with lunch  doxycycline, 100 mg, oral, q12h DREW  furosemide, 40 mg, oral, Daily  ipratropium-albuteroL, 3 mL, nebulization, TID  metoprolol succinate XL, 50 mg, oral, Daily  multivitamin with minerals, 1 tablet, oral, Daily  oxygen, , inhalation, Continuous - Inhalation  sacubitriL-valsartan, 1 tablet, oral, BID  warfarin, 2.5 mg, oral, Daily      Continuous medications     PRN medications  PRN medications: acetaminophen **OR** acetaminophen **OR** acetaminophen, ondansetron ODT **OR** ondansetron     Assessment and Plan    Jose Martin Fleming is a 89 y.o. male with past medical history of aortic stenosis status post aortic valve replacement, atrial fibrillation on Coumadin, CVA, hyperlipidemia, hypertension, who was admitted to the hospital with acute hypoxic respiratory failure due to pneumonia along with hemoptysis,  supratherapeutic INR, possible CHF.     Acute hypoxic respiratory failure secondary to pneumonia and acute on chronic systolic CHF exacerbation along with hemoptysis  -Echo on 12/26/2023 with EF of 20 to 25% with normal pattern of left ventricular diastolic filling.  -CT scan on 9/19 with concern for possible pneumonia along with moderate right and mild left basilar pleural effusions and pulmonary edema.  -Procal at 0.16.   -Urine Legionella antigen and urine strep pneumoniae antigen are negative.  -On Rocephin and doxycycline (not giving Zithromax due to interaction with amiodarone prolonging QTc interval).  -Cardiology following and on oral Lasix and increased from 20 to 40 mg daily and changing losartan to Entresto 24/26 mg twice daily.  -1 episode of hemoptysis yesterday per the patient.  Sputum appeared more brown on exam.  -Hemoglobin at 9.5 today; 9.2 on 9/19.  -Pulmonology following recommends incentive spirometry, pulmonary hygiene.  -On room air today, 9/20.  -Monitor.    Supratherapeutic INR  -Status post vitamin K.  -Holding home med of Coumadin.  -INR at 1.5 today.  -Monitor.     History of aortic stenosis status post valve replacement  -Coumadin on hold and given vitamin K due to supratherapeutic INR as stated above.  -Cardiology following and started patient back on Coumadin today, 9/20, and recommended Coumadin 2.5 mg for the next 3 days and then repeat INR on Monday if patient is being discharged.  Previously, patient had been on Coumadin 5 mg every other day alternating with 2.5 mg of over the day as an outpatient.  -Monitor.     Acute anemia  -Possibly secondary to acute blood loss anemia from hemoptysis.  -Hemoglobin at 9.5 today; 9.2 on 9/19; 10 on 9/18.  On review of the chart, hemoglobin was 13.3 on 12/29/2023 but had been lower back in 9/2021 where it was more in the 9-10 range.  -Monitor.     Atrial fibrillation  -Restarting Coumadin on 9/20 as stated above, which had been on hold due to  supratherapeutic INR and hemoptysis.  -Continue amiodarone, metoprolol XL and monitor.     Acute on chronic systolic CHF exacerbation  -Treating as stated above.  -Continue Entresto, metoprolol XL, oral Lasix as stated above.  -Monitor.     DVT prophylaxis  -SCD's and restarting Coumadin as stated above.

## 2024-09-20 NOTE — PROGRESS NOTES
Subjective Data:  No complaints not short of breath on room air    Overnight Events:    As described below.     Objective Data:  Last Recorded Vitals:  Vitals:    09/19/24 2249 09/20/24 0700 09/20/24 0810 09/20/24 0812   BP: 115/50 112/60     BP Location: Right arm Right arm     Patient Position: Lying Lying     Pulse: 58 56     Resp: 18 17     Temp: 36.3 °C (97.3 °F) 35.8 °C (96.4 °F)     TempSrc: Temporal Temporal     SpO2: 91% 98% 99% 99%   Weight:       Height:           Last Labs:  CBC - 9/20/2024:  4:33 AM  9.4 9.5 453    31.9      CMP - 9/20/2024:  4:33 AM  7.8 7.2 24 --- 0.8   3.3 3.0 21 66      PTT - 12/26/2023: 10:15 AM  1.5   15.2 34.7     BNP   Date/Time Value Ref Range Status   09/18/2024 02:20 PM 2,109 0 - 99 pg/mL Final     HGBA1C   Date/Time Value Ref Range Status   11/20/2023 08:24 AM 5.3 See below % Final   12/01/2022 08:39 AM 5.6 4.0 - 6.0 % Final     Comment:     Hemoglobin A1C levels are related to mean blood glucose during the   preceding 2-3 months. The relationship table below may be used as a   general guide. Each 1% increase in HGB A1C is a reflection of an   increase in mean glucose of approximately 30 mg/dl.   Reference: Diabetes Care, volume 29, supplement 1 Jan. 2006                        HGB A1C ................. Approx. Mean Glucose   _______________________________________________   6%   ...............................  120 mg/dl   7%   ...............................  150 mg/dl   8%   ...............................  180 mg/dl   9%   ...............................  210 mg/dl   10%  ...............................  240 mg/dl  Performed at [address] [address]     11/30/2021 09:01 AM 5.5 4.0 - 6.0 % Final     Comment:     Hemoglobin A1C levels are related to mean blood glucose during the   preceding 2-3 months. The relationship table below may be used as a   general guide. Each 1% increase in HGB A1C is a reflection of an   increase in mean glucose of  approximately 30 mg/dl.   Reference: Diabetes Care, volume 29, supplement 1 Jan. 2006                        HGB A1C ................. Approx. Mean Glucose   _______________________________________________   6%   ...............................  120 mg/dl   7%   ...............................  150 mg/dl   8%   ...............................  180 mg/dl   9%   ...............................  210 mg/dl   10%  ...............................  240 mg/dl  Performed at [address] [address]     03/17/2021 09:23 AM 5.4 <6.0 % Final     Comment:     Hemoglobin A1c Interpretive Ranges:  Results between 6.0 to 6.5 are considered 'prediabetic'.  Results above 6.5% are diabetes 'cut point' based on criteria  from the Diabetes Care and International Expert Committee  Report (Diabetes Care, 2009). Therapeutic action is suggested  at levels above 6.5% HbA1c.     LDLCALC   Date/Time Value Ref Range Status   11/20/2023 08:24  65 - 130 mg/dL Final   12/01/2022 08:39  65 - 130 MG/DL Final   11/30/2021 09:01  65 - 130 MG/DL Final   11/05/2020 09:41  65 - 130 MG/DL Final      Last I/O:  I/O last 3 completed shifts:  In: 1615 (23.7 mL/kg) [P.O.:1565; IV Piggyback:50]  Out: 900 (13.2 mL/kg) [Urine:900 (0.4 mL/kg/hr)]  Weight: 68.1 kg     Past Cardiology Tests (Last 3 Years):  EKG:  ECG 12 lead 09/18/2024      ECG 12 lead 01/09/2024      ECG 12 Lead 12/28/2023      ECG 12 Lead 12/27/2023      ECG 12 lead 12/26/2023      ECG 12 lead (Clinic Performed)     Echo:  Transthoracic echo (TTE) complete 04/19/2024      Transthoracic Echo (TTE) Complete 12/26/2023    Ejection Fractions:  EF   Date/Time Value Ref Range Status   12/26/2023 03:50 PM 24       Cath:  No results found for this or any previous visit from the past 1095 days.    Stress Test:  No results found for this or any previous visit from the past 1095 days.    Cardiac Imaging:  No results found for this or any previous visit from the  past 1095 days.      Inpatient Medications:  Scheduled medications   Medication Dose Route Frequency    amiodarone  200 mg oral Daily    cefTRIAXone  1 g intravenous q24h    colesevelam  625 mg oral Daily with lunch    doxycycline  100 mg oral q12h DREW    furosemide  40 mg oral Daily    ipratropium-albuteroL  3 mL nebulization TID    metoprolol succinate XL  50 mg oral Daily    multivitamin with minerals  1 tablet oral Daily    oxygen   inhalation Continuous - Inhalation    sacubitriL-valsartan  1 tablet oral BID     PRN medications   Medication    acetaminophen    Or    acetaminophen    Or    acetaminophen    ondansetron ODT    Or    ondansetron     Continuous Medications   Medication Dose Last Rate       Physical Exam:  The patient is a relatively well-appearing upper elderly white male lying in bed.  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  Patient admitted with a supratherapeutic INR and hemoptysis.  The INR initially was greater than 8.0 improved to 2.6 today after receiving oral vitamin K.  Patient with some ongoing shortness of breath now requiring nasal oxygen.  He had been a smoker for 53 pack years also has been on amiodarone. His last echocardiogram 12/26/2023 showed a severely decreased LV ejection fraction at 20-25% with the bileaflet mechanical aortic valve appearing to operate for properly with a peak systolic gradient of 9 mmHg.  Other pertinent findings included 2+ mitral valve regurgitation  moderate to severe left atrial enlargement 2+ tricuspid valve regurgitation mild to moderate right atrial enlargement mild to moderate pulmonary hypertension estimated PA systolic pressure 47 mmHg.  The patient has been on Toprol-XL and losartan 100 mg daily.  Patient may benefit from a conversion from losartan to Entresto.  He has been on low-dose Lasix 20 mg daily which will be increased to 40 mg daily.     9/20: Patient feeling well not short of breath with sufficient O2 saturations on room air.  His INR yesterday was 1.9 decreased to 1.5 today.  Prior to admission the patient had been alternating 5 mg with 2.5 mg daily which resulted in a supratherapeutic INR.  He will be instructed to restart the Coumadin 2.5 mg for the next 3 days and then be seen in Coumadin clinic on Monday 9/23 2024 to have an INR recheck for further instructions.  Will continue his preadmission cardiac therapy otherwise unchanged other than as noted above a slight increase in Lasix from 20 to 40 mg daily and a change from losartan to Entresto 24/26 mg twice daily.  Patient will be seen in 2 to 3 weeks for outpatient office follow-up.    Peripheral IV 09/18/24 18 G Distal;Right;Upper Arm (Active)   Site Assessment Clean;Dry 09/20/24 0900   Dressing Status Clean;Dry;Occlusive 09/20/24 0900   Number of days: 2       Code Status:  Full Code    I spent 20 minutes in the professional and overall care of this patient.        Anirudh Rehman MD

## 2024-09-20 NOTE — CARE PLAN
The patient's goals for the shift include      The clinical goals for the shift include monitor labs, maintsain safety,      Problem: Fall/Injury  Goal: Not fall by end of shift  Outcome: Progressing  Goal: Be free from injury by end of the shift  Outcome: Progressing  Goal: Verbalize understanding of personal risk factors for fall in the hospital  Outcome: Progressing  Goal: Verbalize understanding of risk factor reduction measures to prevent injury from fall in the home  Outcome: Progressing  Goal: Use assistive devices by end of the shift  Outcome: Progressing  Goal: Pace activities to prevent fatigue by end of the shift  Outcome: Progressing     Problem: Respiratory  Goal: Minimize anxiety/maximize coping throughout shift  Outcome: Progressing  Goal: Minimal/no exertional discomfort or dyspnea this shift  Outcome: Progressing  Goal: Verbalize decreased shortness of breath this shift  Outcome: Progressing

## 2024-09-20 NOTE — PROGRESS NOTES
Occupational Therapy    OT Treatment    Patient Name: Jose Martin Fleming  MRN: [id number]  Department: [address]  Room: [facility]  Today's Date: 9/20/2024  Time Calculation  Start Time: 0948  Stop Time: 1021  Time Calculation (min): 33 min        Assessment:  OT Assessment: Pt highly motivated progressing with POC.  Will continue to address remaining deficits with skilled OT intervention.  Prognosis: Good  Evaluation/Treatment Tolerance: Patient limited by fatigue  Medical Staff Made Aware: Yes  End of Session Communication: Bedside nurse  End of Session Patient Position: Up in chair, Alarm on, Caregiver's arms (call light in reach Pt demonstrate use all needs met)  OT Assessment Results: Decreased ADL status, Decreased endurance, Decreased functional mobility  Prognosis: Good  Evaluation/Treatment Tolerance: Patient limited by fatigue  Medical Staff Made Aware: Yes  Strengths: Ability to acquire knowledge, Attitude of self, Premorbid level of function, Support of extended family/friends  Barriers to Participation: Comorbidities  Plan:  Treatment Interventions: ADL retraining, Functional transfer training, Endurance training, Patient/family training, Equipment evaluation/education, Compensatory technique education  OT Frequency: 3 times per week  OT Discharge Recommendations: Low intensity level of continued care  Equipment Recommended upon Discharge: Wheeled walker  OT Recommended Transfer Status: Assist of 1  OT - OK to Discharge: Yes  Treatment Interventions: ADL retraining, Functional transfer training, Endurance training, Patient/family training, Equipment evaluation/education, Compensatory technique education    Subjective   Previous Visit Info:  OT Last Visit  OT Received On: 09/20/24  General:  General  Reason for Referral: decreased ADL's  Referred By: Dr Jones  Past Medical History Relevant to Rehab: anemia, aortic stenosis, AVR '88, CVA, a-fib, HTN, bilat KOBY, CHF, ex smoker  Family/Caregiver Present:  Yes  Caregiver Feedback: spouse  Prior to Session Communication: Bedside nurse  Patient Position Received: Bed, 3 rail up, Alarm off, not on at start of session  Preferred Learning Style: verbal, visual  General Comment: Pt cleared by  NSG and agreeable to skilled OT intervention  Precautions:  Hearing/Visual Limitations: mildly [place], reading glasses  Medical Precautions: Fall precautions, Oxygen therapy device and L/min (3LO2 NC)    Vital Signs (Past 2hrs)               Pain:  Pain Assessment  Pain Assessment: 0-10  0-10 (Numeric) Pain Score: 0 - No pain    Objective    Cognition:  Cognition  Overall Cognitive Status: Within Functional Limits  Orientation Level: Oriented X4  Insight: Mild  Impulsive: Mildly  Coordination:  Movements are Fluid and Coordinated: Yes  Activities of Daily Living:      Grooming  Grooming Level of Assistance: Distant supervision  Grooming Where Assessed: Standing sinkside  Grooming Comments: Pt washing hands, brushing teeth    UE Bathing  UE Bathing Level of Assistance: Setup  UE Bathing Where Assessed: Sitting sinkside  UE Bathing Comments: Pt sponge bathing    LE Bathing  LE Bathing Level of Assistance: Close supervision  LE Bathing Where Assessed: Sitting sinkside, Standing sinkside  LE Bathing Comments: Pt sponge bathing seated reach to feet commode level stance wash buttocks/gonzalo matteo RW use    UE Dressing  UE Dressing Level of Assistance: Setup  UE Dressing Where Assessed: Other (Comment) (sitting commode)  UE Dressing Comments: Pt doff/don hospital gown    LE Dressing  LE Dressing: Yes  Pants Level of Assistance: Close supervision  Sock Level of Assistance: Close supervision  LE Dressing Where Assessed: Toilet  LE Dressing Comments: Pt doff/don socks seated low surface thread sock/pands efficiently    Toileting  Toileting Level of Assistance: Close supervision  Where Assessed: Toilet  Toileting Comments: Pt adjusting clothing prior/after/hygiene  Functional Standing  Tolerance:  Time: 3 minutes  Activity: ADL/transfers  Functional Standing Tolerance Comments: no LOB RW use  Bed Mobility/Transfers: Bed Mobility  Bed Mobility: Yes  Bed Mobility 1  Bed Mobility 1: Supine to sitting, Sitting to supine  Level of Assistance 1: Distant supervision  Bed Mobility Comments 1: bed ot neutral no use side transfer bar  Bed Mobility 2  Bed Mobility  2: Scooting  Level of Assistance 2: Distant supervision  Bed Mobility Comments 2: seated to edge of bed    Transfers  Transfer: Yes  Transfer 1  Transfer From 1: Bed to  Transfer to 1: Stand  Technique 1: Sit to stand, Stand to sit  Transfer Device 1: Walker  Transfer Level of Assistance 1: Close supervision  Trials/Comments 1: vc hanc placement  Transfers 2  Transfer From 2: Stand to  Transfer to 2: Chair with arms  Technique 2: Stand to sit  Transfer Device 2: Walker  Transfer Level of Assistance 2: Close supervision  Trials/Comments 2: vc hand placement    Toilet Transfers  Toilet Transfer From: Rolling walker  Toilet Transfer Type: To and from  Toilet Transfer to: Standard toilet  Toilet Transfer Technique: Ambulating  Toilet Transfers: Contact guard  Toilet Transfers Comments: grab bar use     Functional Mobility:  Functional Mobility  Functional Mobility Performed: Yes  Functional Mobility 1  Surface 1: Level tile  Device 1: Rolling walker  Assistance 1: Contact guard  Comments 1: 15' x 2 to include doorway, turns and backing    Therapy/Activity:    Therapeutic Activity  Therapeutic Activity Performed: Yes  Therapeutic Activity 1: Instructed Pt with application energy conservation principles of pacing, grouping of task deep breathing with fair teach back    Outcome Measures:[facility] Daily Activity  Putting on and taking off regular lower body clothing: A little  Bathing (including washing, rinsing, drying): A little  Putting on and taking off regular upper body clothing: A little  Toileting, which includes using toilet, bedpan or urinal: A  little  Taking care of personal grooming such as brushing teeth: A little  Eating Meals: None  Daily Activity - Total Score: 19    Education Documentation  Body Mechanics, taught by PAM Irwin at 9/20/2024 10:35 AM.  Learner: Patient  Readiness: Acceptance  Method: Explanation, Demonstration, Teach-back  Response: Verbalizes Understanding, Demonstrated Understanding  Comment: Instructed Pt with application energy conservation principles to safe transfers, balance strategies, adaptive ADL skills, body mechanics    ADL Training, taught by PAM Irwin at 9/20/2024 10:35 AM.  Learner: Patient  Readiness: Acceptance  Method: Explanation, Demonstration, Teach-back  Response: Verbalizes Understanding, Demonstrated Understanding  Comment: Instructed Pt with application energy conservation principles to safe transfers, balance strategies, adaptive ADL skills, body mechanics    Education Comments  No comments found.      OP EDUCATION:       Goals:  Encounter Problems       Encounter Problems (Active)       OT Goals       ADL's (Progressing)       Start:  09/19/24    Expected End:  10/03/24       Pt will complete ADL tasks w/ Mod I w/ AE/ compensatory tech for safety and increased independence in self care tasks.         Functional transfers (Progressing)       Start:  09/19/24    Expected End:  10/03/24       Pt will demon bed, chair and toilet transfers w/ Mod I w/ LRD, elev seat heights using B arm supports for safety and increased independence in daily tasks.          Functional endurance (Progressing)       Start:  09/19/24    Expected End:  10/03/24       Pt will tolerate 20 minutes of functional activity to improve functional endurance for safety and increased independence in daily tasks and functional mobility.

## 2024-09-20 NOTE — TELEPHONE ENCOUNTER
Per Dr. Rehman, pt being discharged on Warfarin 2.5mg daily and wants pt seen in [facility] clinic on Mon 9/23.  POCT scheduled for 0945.  Called and spoke with wife and given appt time.

## 2024-09-20 NOTE — PROGRESS NOTES
"Jose Martin Fleming is a 89 y.o. male on day 2 of admission seen in follow-up for hypoxia and hemoptysis      Subjective   On  2 L nasal cannula oxygen; oxygen sats 99%. Placed on room air on my exam. R.N. aware. No respiratory complaints. Denies pain. Afebrile.       Objective     Physical Exam  Vitals and nursing note reviewed.   Constitutional:       Appearance: Normal appearance.   HENT:      Head: Normocephalic and atraumatic.      Nose: Nose normal.      Mouth/Throat:      Mouth: Mucous membranes are moist.   Eyes:      Extraocular Movements: Extraocular movements intact.      Conjunctiva/sclera: Conjunctivae normal.      Pupils: Pupils are equal, round, and reactive to light.   Cardiovascular:      Rate and Rhythm: Normal rate and regular rhythm.      Pulses: Normal pulses.      Heart sounds: Normal heart sounds.   Pulmonary:      Effort: Pulmonary effort is normal.      Comments: Lungs diminished but clear.   Abdominal:      General: Bowel sounds are normal.      Palpations: Abdomen is soft.   Musculoskeletal:         General: Normal range of motion.   Skin:     General: Skin is warm and dry.      Capillary Refill: Capillary refill takes less than 2 seconds.   Neurological:      General: No focal deficit present.      Mental Status: He is alert and oriented to person, place, and time.   Psychiatric:         Mood and Affect: Mood normal.         Behavior: Behavior normal.         Last Recorded Vitals  Blood pressure 112/60, pulse 56, temperature 35.8 °C (96.4 °F), temperature source Temporal, resp. rate 17, height 1.753 m (5' 9\"), weight 68.1 kg (150 lb 2.1 oz), SpO2 99%.  Intake/Output last 3 Shifts:  I/O last 3 completed shifts:  In: 1615 (23.7 mL/kg) [P.O.:1565; IV Piggyback:50]  Out: 900 (13.2 mL/kg) [Urine:900 (0.4 mL/kg/hr)]  Weight: 68.1 kg       Intake/Output Summary (Last 24 hours) at 9/20/2024 0984  Last data filed at 9/20/2024 0501  Gross per 24 hour   Intake 800 ml   Output 200 ml   Net 600 ml    "   Scheduled medications:  amiodarone, 200 mg, oral, Daily  cefTRIAXone, 1 g, intravenous, q24h  colesevelam, 625 mg, oral, Daily with lunch  doxycycline, 100 mg, oral, q12h DREW  furosemide, 40 mg, oral, Daily  ipratropium-albuteroL, 3 mL, nebulization, TID  metoprolol succinate XL, 50 mg, oral, Daily  multivitamin with minerals, 1 tablet, oral, Daily  oxygen, , inhalation, Continuous - Inhalation  sacubitriL-valsartan, 1 tablet, oral, BID     PRN medications: acetaminophen **OR** acetaminophen **OR** acetaminophen, ondansetron ODT **OR** ondansetron       Relevant Results  Results for orders placed or performed during the hospital encounter of 09/18/24 (from the past 24 hour(s))   Protime-INR   Result Value Ref Range    Protime 18.7 (H) 9.3 - 12.7 seconds    INR 1.9 (H) 0.9 - 1.2   Blood Culture    Specimen: Peripheral Venipuncture; Blood culture   Result Value Ref Range    Blood Culture Loaded on Instrument - Culture in progress    Procalcitonin   Result Value Ref Range    Procalcitonin 0.16 (H) <=0.07 ng/mL   Blood Culture    Specimen: Peripheral Venipuncture; Blood culture   Result Value Ref Range    Blood Culture Loaded on Instrument - Culture in progress    CBC and Auto Differential   Result Value Ref Range    WBC 9.4 4.4 - 11.3 x10*3/uL    nRBC 0.0 0.0 - 0.0 /100 WBCs    RBC 4.09 (L) 4.50 - 5.90 x10*6/uL    Hemoglobin 9.5 (L) 13.5 - 17.5 g/dL    Hematocrit 31.9 (L) 41.0 - 52.0 %    MCV 78 (L) 80 - 100 fL    MCH 23.2 (L) 26.0 - 34.0 pg    MCHC 29.8 (L) 32.0 - 36.0 g/dL    RDW 20.5 (H) 11.5 - 14.5 %    Platelets 453 (H) 150 - 450 x10*3/uL    Neutrophils % 72.6 40.0 - 80.0 %    Immature Granulocytes %, Automated 0.3 0.0 - 0.9 %    Lymphocytes % 8.9 13.0 - 44.0 %    Monocytes % 12.7 2.0 - 10.0 %    Eosinophils % 5.2 0.0 - 6.0 %    Basophils % 0.3 0.0 - 2.0 %    Neutrophils Absolute 6.82 (H) 1.60 - 5.50 x10*3/uL    Immature Granulocytes Absolute, Automated 0.03 0.00 - 0.50 x10*3/uL    Lymphocytes Absolute 0.84 0.80  - 3.00 x10*3/uL    Monocytes Absolute 1.19 (H) 0.05 - 0.80 x10*3/uL    Eosinophils Absolute 0.49 (H) 0.00 - 0.40 x10*3/uL    Basophils Absolute 0.03 0.00 - 0.10 x10*3/uL   Protime-INR   Result Value Ref Range    Protime 15.2 (H) 9.3 - 12.7 seconds    INR 1.5 (H) 0.9 - 1.2   Renal Function Panel   Result Value Ref Range    Glucose 86 74 - 99 mg/dL    Sodium 137 136 - 145 mmol/L    Potassium 3.9 3.5 - 5.3 mmol/L    Chloride 106 98 - 107 mmol/L    Bicarbonate 26 21 - 32 mmol/L    Anion Gap 9 (L) 10 - 20 mmol/L    Urea Nitrogen 30 (H) 6 - 23 mg/dL    Creatinine 1.22 0.50 - 1.30 mg/dL    eGFR 57 (L) >60 mL/min/1.73m*2    Calcium 7.8 (L) 8.6 - 10.3 mg/dL    Phosphorus 3.3 2.5 - 4.9 mg/dL    Albumin 3.0 (L) 3.4 - 5.0 g/dL   Magnesium   Result Value Ref Range    Magnesium 2.10 1.60 - 2.40 mg/dL   Morphology   Result Value Ref Range    RBC Morphology See Below     Ovalocytes Few     Micheal Cells Few       CT chest wo IV contrast  Result Date: 9/19/2024  FINDINGS: Mediastinum demonstrates scattered lymphadenopathy within the paratracheal and AP window locations as well as subcarinal location. Right paratracheal lymphadenopathy identified measures 10 mm in the short axis with findings slightly more conspicuous from prior imaging. Subcarinal lymph node identified measures 11 mm in the short axis. There is no hilar lymphadenopathy within limits of this unenhanced CT. Esophagus is unremarkable   Heart and great vessels demonstrate ascending thoracic aorta to measure 4.0 cm with dilatation as seen on prior imaging. There is moderate vascular calcification of the thoracic aorta. Median sternotomy wires in place with aortic valve replacement demonstrated.   Moderate right and small left basilar pleural effusions. There is bilateral basilar compressive atelectasis. Mild fluid tracking along the right major fissure. There is moderate centrilobular emphysema with component of increased interstitial prominence. There is component of  geographic attenuation lung parenchyma with air trapping/pulmonary edema. More focal infiltrate demonstrated in the posterior segment of the right upper lobe.   Included portions visualized abdomen demonstrate exophytic cyst from the superior pole of the left kidney posteriorly measuring 3.6 cm. Cholelithiasis is demonstrated   Visualized osseous structures demonstrate no compression deformity in the spine. Mild multilevel anterior osteophyte formation demonstrated.  1. Moderate right and mild left basilar pleural effusions with basilar compressive atelectasis. There is component of pulmonary edema demonstrated.   2. Asymmetric infiltrate without airspace consolidation posterior segment of the right upper lobe laterally with postinfectious etiology not excluded   3. Moderate centrilobular emphysema.      XR chest 2 views  Result Date: 9/18/2024  FINDINGS: CARDIOMEDIASTINAL SILHOUETTE AND VASCULATURE:   Cardiac size:  Upper limits of normal, status post median sternotomy. Aortic shadow:  Within normal limits.   Mediastinal contours: Within normal limits.   Pulmonary vasculature:  The central vasculature is unremarkable   LUNGS: There is interstitial prominence particular in the upper lobes since the previous exam. This could be due to development of fibrosis, but interstitial infiltrate from pneumonia and/or congestion would be suspected. There is additional opacity at the right base probably from atelectasis or focal infiltrate.   ABDOMEN AND OTHER FINDINGS: No remarkable upper abdominal findings.   BONES: No acute osseous changes.  1.  Probable upper lobe infiltrates from pneumonia and/or congestion.        Assessment/Plan   Acute hypoxic respiratory failure  Stable on room air  Continuous pulse oximetry  Incentive spirometry/pulmonary hygiene     Hemoptysis  One episode the yesterday for moderate amount of bright-red blood per patient-he did not save  One episode of small amount, thick brown sputum today  Reviewed  CT scan chest with collaborating physician Dr. Singh  Continue to monitor hemoptysis, INR  Coumadin per Cardiology     Supra therapeutic INR  Resolving  Coumadin on hold     Acute on chronic systolic congestive heart failure  40 mg oral Lasix daily   Cardiology follow-up     Ex tobacco use  > 50 pack/year history      Yi Meyers, APRN-CNP

## 2024-09-21 ENCOUNTER — PHARMACY VISIT (OUTPATIENT)
Dept: PHARMACY | Facility: CLINIC | Age: 89
End: 2024-09-21
Payer: MEDICARE

## 2024-09-21 VITALS
HEART RATE: 66 BPM | SYSTOLIC BLOOD PRESSURE: 135 MMHG | WEIGHT: 150.13 LBS | BODY MASS INDEX: 22.24 KG/M2 | OXYGEN SATURATION: 91 % | RESPIRATION RATE: 16 BRPM | TEMPERATURE: 96.8 F | HEIGHT: 69 IN | DIASTOLIC BLOOD PRESSURE: 56 MMHG

## 2024-09-21 LAB
ALBUMIN SERPL BCP-MCNC: 3 G/DL (ref 3.4–5)
ANION GAP SERPL CALCULATED.3IONS-SCNC: 11 MMOL/L (ref 10–20)
BASOPHILS # BLD AUTO: 0.04 X10*3/UL (ref 0–0.1)
BASOPHILS NFR BLD AUTO: 0.4 %
BUN SERPL-MCNC: 22 MG/DL (ref 6–23)
BURR CELLS BLD QL SMEAR: NORMAL
CALCIUM SERPL-MCNC: 7.9 MG/DL (ref 8.6–10.3)
CHLORIDE SERPL-SCNC: 106 MMOL/L (ref 98–107)
CO2 SERPL-SCNC: 27 MMOL/L (ref 21–32)
CREAT SERPL-MCNC: 1.2 MG/DL (ref 0.5–1.3)
EGFRCR SERPLBLD CKD-EPI 2021: 58 ML/MIN/1.73M*2
EOSINOPHIL # BLD AUTO: 0.56 X10*3/UL (ref 0–0.4)
EOSINOPHIL NFR BLD AUTO: 6.1 %
ERYTHROCYTE [DISTWIDTH] IN BLOOD BY AUTOMATED COUNT: 20.3 % (ref 11.5–14.5)
GLUCOSE SERPL-MCNC: 83 MG/DL (ref 74–99)
HCT VFR BLD AUTO: 32.7 % (ref 41–52)
HGB BLD-MCNC: 9.9 G/DL (ref 13.5–17.5)
IMM GRANULOCYTES # BLD AUTO: 0.04 X10*3/UL (ref 0–0.5)
IMM GRANULOCYTES NFR BLD AUTO: 0.4 % (ref 0–0.9)
INR PPP: 2 (ref 0.9–1.2)
LYMPHOCYTES # BLD AUTO: 0.8 X10*3/UL (ref 0.8–3)
LYMPHOCYTES NFR BLD AUTO: 8.7 %
MAGNESIUM SERPL-MCNC: 2.21 MG/DL (ref 1.6–2.4)
MCH RBC QN AUTO: 23.5 PG (ref 26–34)
MCHC RBC AUTO-ENTMCNC: 30.3 G/DL (ref 32–36)
MCV RBC AUTO: 78 FL (ref 80–100)
MONOCYTES # BLD AUTO: 1.21 X10*3/UL (ref 0.05–0.8)
MONOCYTES NFR BLD AUTO: 13.2 %
NEUTROPHILS # BLD AUTO: 6.52 X10*3/UL (ref 1.6–5.5)
NEUTROPHILS NFR BLD AUTO: 71.2 %
NRBC BLD-RTO: 0 /100 WBCS (ref 0–0)
OVALOCYTES BLD QL SMEAR: NORMAL
PHOSPHATE SERPL-MCNC: 3.3 MG/DL (ref 2.5–4.9)
PLATELET # BLD AUTO: 465 X10*3/UL (ref 150–450)
POLYCHROMASIA BLD QL SMEAR: NORMAL
POTASSIUM SERPL-SCNC: 4.6 MMOL/L (ref 3.5–5.3)
PROTHROMBIN TIME: 19.5 SECONDS (ref 9.3–12.7)
RBC # BLD AUTO: 4.21 X10*6/UL (ref 4.5–5.9)
RBC MORPH BLD: NORMAL
SODIUM SERPL-SCNC: 139 MMOL/L (ref 136–145)
WBC # BLD AUTO: 9.2 X10*3/UL (ref 4.4–11.3)

## 2024-09-21 PROCEDURE — 2500000002 HC RX 250 W HCPCS SELF ADMINISTERED DRUGS (ALT 637 FOR MEDICARE OP, ALT 636 FOR OP/ED): Performed by: INTERNAL MEDICINE

## 2024-09-21 PROCEDURE — 2500000001 HC RX 250 WO HCPCS SELF ADMINISTERED DRUGS (ALT 637 FOR MEDICARE OP): Performed by: INTERNAL MEDICINE

## 2024-09-21 PROCEDURE — 85025 COMPLETE CBC W/AUTO DIFF WBC: CPT | Performed by: INTERNAL MEDICINE

## 2024-09-21 PROCEDURE — 94640 AIRWAY INHALATION TREATMENT: CPT

## 2024-09-21 PROCEDURE — 94760 N-INVAS EAR/PLS OXIMETRY 1: CPT

## 2024-09-21 PROCEDURE — 97116 GAIT TRAINING THERAPY: CPT | Mod: GP,CQ

## 2024-09-21 PROCEDURE — 83735 ASSAY OF MAGNESIUM: CPT | Performed by: INTERNAL MEDICINE

## 2024-09-21 PROCEDURE — 36415 COLL VENOUS BLD VENIPUNCTURE: CPT | Performed by: INTERNAL MEDICINE

## 2024-09-21 PROCEDURE — 9420000001 HC RT PATIENT EDUCATION 5 MIN

## 2024-09-21 PROCEDURE — 99232 SBSQ HOSP IP/OBS MODERATE 35: CPT | Performed by: NURSE PRACTITIONER

## 2024-09-21 PROCEDURE — 80069 RENAL FUNCTION PANEL: CPT | Performed by: INTERNAL MEDICINE

## 2024-09-21 PROCEDURE — 99235 HOSP IP/OBS SAME DATE MOD 70: CPT | Performed by: INTERNAL MEDICINE

## 2024-09-21 PROCEDURE — 85610 PROTHROMBIN TIME: CPT | Performed by: INTERNAL MEDICINE

## 2024-09-21 PROCEDURE — RXMED WILLOW AMBULATORY MEDICATION CHARGE

## 2024-09-21 PROCEDURE — 97110 THERAPEUTIC EXERCISES: CPT | Mod: GP,CQ

## 2024-09-21 PROCEDURE — 2500000005 HC RX 250 GENERAL PHARMACY W/O HCPCS: Performed by: INTERNAL MEDICINE

## 2024-09-21 RX ORDER — WARFARIN 1 MG/1
1 TABLET ORAL SEE ADMIN INSTRUCTIONS
Qty: 14 TABLET | Refills: 0 | Status: SHIPPED | OUTPATIENT
Start: 2024-09-21 | End: 2024-10-05

## 2024-09-21 RX ORDER — FUROSEMIDE 40 MG/1
40 TABLET ORAL DAILY
Qty: 30 TABLET | Refills: 2 | Status: SHIPPED | OUTPATIENT
Start: 2024-09-22 | End: 2024-10-22

## 2024-09-21 RX ORDER — DOXYCYCLINE 100 MG/1
100 CAPSULE ORAL 2 TIMES DAILY
Qty: 8 CAPSULE | Refills: 0 | Status: SHIPPED | OUTPATIENT
Start: 2024-09-21 | End: 2024-09-25

## 2024-09-21 ASSESSMENT — COGNITIVE AND FUNCTIONAL STATUS - GENERAL
CLIMB 3 TO 5 STEPS WITH RAILING: A LITTLE
MOBILITY SCORE: 21
WALKING IN HOSPITAL ROOM: A LITTLE
MOVING TO AND FROM BED TO CHAIR: A LITTLE

## 2024-09-21 ASSESSMENT — PAIN SCALES - GENERAL: PAINLEVEL_OUTOF10: 0 - NO PAIN

## 2024-09-21 ASSESSMENT — PAIN - FUNCTIONAL ASSESSMENT: PAIN_FUNCTIONAL_ASSESSMENT: 0-10

## 2024-09-21 NOTE — CARE PLAN
Problem: Fall/Injury  Goal: Not fall by end of shift  Outcome: Progressing  Goal: Be free from injury by end of the shift  Outcome: Progressing  Goal: Verbalize understanding of personal risk factors for fall in the hospital  Outcome: Progressing  Goal: Verbalize understanding of risk factor reduction measures to prevent injury from fall in the home  Outcome: Progressing   The patient's goals for the shift include      The clinical goals for the shift include Safety

## 2024-09-21 NOTE — PROGRESS NOTES
Physical Therapy    Physical Therapy Treatment    Patient Name: Jose Martin Fleming  MRN: [id number]  Department: [address]  Room: [address] [place] [address]  Today's Date: 9/21/2024  Time Calculation  Start Time: 0925  Stop Time: 0950  Time Calculation (min): 25 min         Assessment/Plan   PT Assessment  PT Assessment Results: Decreased strength, Decreased range of motion, Decreased endurance, Impaired balance, Decreased mobility, Decreased coordination, Impaired judgement, Decreased safety awareness, Impaired hearing, Pain  Rehab Prognosis: Good  Barriers to Discharge: none  Assessment Comment: Pt slightly impulsive and has decreased safety awarness; VCes/education provided thorughout session to correct. Pt able to increase amb distance today. Pt left sitting in chair w chair alarm on and needs in reach.  End of Session Patient Position: Up in chair, Alarm on  PT Plan  Inpatient/Swing Bed or Outpatient: Inpatient  PT Plan  Treatment/Interventions: Transfer training, Bed mobility, Gait training, Stair training, Balance training, Strengthening, Endurance training, Therapeutic exercise, Therapeutic activity  PT Plan: Ongoing PT  PT Frequency: 4 times per week  PT Discharge Recommendations: Low intensity level of continued care, Other (comment) (use of RW ~ reports will need to retrieve it from the attic.)  Equipment Recommended upon Discharge: Wheeled walker  PT Recommended Transfer Status: Assist x1, Assistive device  PT - OK to Discharge: Yes      General Visit Information:   PT  Visit  PT Received On: 09/21/24  General  Reason for Referral: decreased ADL's  Referred By: Dr Jones  Past Medical History Relevant to Rehab: anemia, aortic stenosis, AVR '88, CVA, a-fib, HTN, bilat KOBY, CHF, ex smoker  Prior to Session Communication: Bedside nurse  Patient Position Received:  (sitting at EOB upon arrival)  Preferred Learning Style: verbal, visual  General Comment: Cleared by [place] to be seen for PT. Pt sitting at EOB upon arrival and is  agreeable to PT. Pt reports having 0/10 pain upon arrival.    Subjective       Vital Signs (Past 2hrs)        Date/Time Vitals Session Patient Position Pulse Resp SpO2 BP MAP (mmHg)    09/21/24 0925 --  --  --  --  94 %  --  --                         Objective   Pain:  Pain Assessment  Pain Assessment: 0-10  0-10 (Numeric) Pain Score: 0 - No pain  Cognition:  Cognition  Overall Cognitive Status: Within Functional Limits    Treatments:  Therapeutic Exercise  Therapeutic Exercise Performed: Yes  Therapeutic Exercise Activity 1: Seat ther ex completed 15-20x each LE: ankle pumps, marches, LAQs, hip iso add/abd.    Ambulation/Gait Training  Ambulation/Gait Training Performed: Yes  Ambulation/Gait Training 1  Surface 1: Level tile  Device 1: Rolling walker  Assistance 1: Contact guard  Comments/Distance (ft) 1: Pt needed VCes for safefy awareness using RW and to slow down. Pt able to increase amb distance to 40 ft x2 using RW and CGA/min A for safety.  Transfers  Transfer: Yes  Transfer 1  Transfer From 1: Bed to  Transfer to 1: Stand  Technique 1: Sit to stand, Stand to sit  Transfer Device 1: Walker  Transfer Level of Assistance 1: Close supervision  Trials/Comments 1: Safe transfer observed.  Transfers 2  Transfer From 2: Stand to  Transfer to 2: Chair with arms  Technique 2: Stand to sit  Transfer Device 2: Walker  Transfer Level of Assistance 2: Close supervision    Stairs  Stairs: No    Outcome Measures:  [facility] Basic Mobility  Turning from your back to your side while in a flat bed without using bedrails: None  Moving from lying on your back to sitting on the side of a flat bed without using bedrails: None  Moving to and from bed to chair (including a wheelchair): A little  Standing up from a chair using your arms (e.g. wheelchair or bedside chair): None  To walk in hospital room: A little  Climbing 3-5 steps with railing: A little  Basic Mobility - Total Score: 21    Education Documentation  Handouts, taught by  Amaya Knapp PTA at 9/21/2024 10:22 AM.  Learner: Patient  Readiness: Acceptance  Method: Explanation  Response: Verbalizes Understanding    Precautions, taught by Amaya Knapp PTA at 9/21/2024 10:22 AM.  Learner: Patient  Readiness: Acceptance  Method: Explanation  Response: Verbalizes Understanding    Body Mechanics, taught by Amaya Knapp PTA at 9/21/2024 10:22 AM.  Learner: Patient  Readiness: Acceptance  Method: Explanation  Response: Verbalizes Understanding    Home Exercise Program, taught by Amaya Knapp PTA at 9/21/2024 10:22 AM.  Learner: Patient  Readiness: Acceptance  Method: Explanation  Response: Verbalizes Understanding    Mobility Training, taught by Amaya Knapp PTA at 9/21/2024 10:22 AM.  Learner: Patient  Readiness: Acceptance  Method: Explanation  Response: Verbalizes Understanding    Education Comments  No comments found.        OP EDUCATION:       Encounter Problems       Encounter Problems (Active)       Balance       LTG - Patient will maintain balance to allow for safe mobility (Progressing)       Start:  09/19/24    Expected End:  09/27/24               Mobility       STG - Patient will ambulate 100' w/ RW and distant supervision  (Progressing)       Start:  09/19/24    Expected End:  09/27/24            STG - Patient will ambulate up and down a curb/step w/ close supervision  (Progressing)       Start:  09/19/24    Expected End:  09/27/24            Pt will tolerate BLE exercises to promote functional strength, balance and endurance (Progressing)       Start:  09/19/24    Expected End:  09/27/24               PT Transfers       STG - Patient to transfer to and from sit to supine IND (Progressing)       Start:  09/19/24    Expected End:  09/27/24            STG - Patient will transfer sit to and from stand MOD I  (Progressing)       Start:  09/19/24    Expected End:  09/27/24

## 2024-09-21 NOTE — DISCHARGE INSTRUCTIONS
Please check your INR on 9/23, for adjusting coumadin dosing.   Please follow up with your PCP to monitor your kidney function in a week.

## 2024-09-21 NOTE — PROGRESS NOTES
"Jose Martin Fleming is a 89 y.o. male on day 3 of admission seen in follow-up for hypoxia and hemoptysis      Subjective   Sitting up in chair, working with therapy. On 2 L nasal cannula oxygen; oxygen sats 97%.  Placed on room air on my exam. R.N. aware. No respiratory complaints. Denies cough. Denies pain. Afebrile.       Objective     Physical Exam  Vitals and nursing note reviewed.   Constitutional:       Appearance: Normal appearance.   HENT:      Head: Normocephalic and atraumatic.      Nose: Nose normal.      Mouth/Throat:      Mouth: Mucous membranes are moist.   Eyes:      Extraocular Movements: Extraocular movements intact.      Conjunctiva/sclera: Conjunctivae normal.      Pupils: Pupils are equal, round, and reactive to light.   Cardiovascular:      Rate and Rhythm: Normal rate and regular rhythm.      Pulses: Normal pulses.      Heart sounds: Normal heart sounds.   Pulmonary:      Effort: Pulmonary effort is normal.      Breath sounds: Normal breath sounds.      Comments: Lungs diminished but clear.   Abdominal:      General: Bowel sounds are normal.      Palpations: Abdomen is soft.   Musculoskeletal:         General: Normal range of motion.   Skin:     General: Skin is warm and dry.      Capillary Refill: Capillary refill takes less than 2 seconds.   Neurological:      General: No focal deficit present.      Mental Status: He is alert and oriented to person, place, and time.   Psychiatric:         Mood and Affect: Mood normal.         Behavior: Behavior normal.         Last Recorded Vitals  Blood pressure 135/56, pulse 66, temperature 36 °C (96.8 °F), temperature source Oral, resp. rate 16, height 1.753 m (5' 9\"), weight 68.1 kg (150 lb 2.1 oz), SpO2 92%.  Intake/Output last 3 Shifts:  I/O last 3 completed shifts:  In: 800 (11.7 mL/kg) [P.O.:750; IV Piggyback:50]  Out: - (0 mL/kg)   Weight: 68.1 kg       Intake/Output Summary (Last 24 hours) at 9/21/2024 0921  Last data filed at 9/21/2024 0817  Gross per " 24 hour   Intake 450 ml   Output --   Net 450 ml      Scheduled medications:  amiodarone, 200 mg, oral, Daily  cefTRIAXone, 1 g, intravenous, q24h  colesevelam, 625 mg, oral, Daily with lunch  doxycycline, 100 mg, oral, q12h DREW  furosemide, 40 mg, oral, Daily  ipratropium-albuteroL, 3 mL, nebulization, TID  metoprolol succinate XL, 50 mg, oral, Daily  multivitamin with minerals, 1 tablet, oral, Daily  oxygen, , inhalation, Continuous - Inhalation  sacubitriL-valsartan, 1 tablet, oral, BID  warfarin, 2.5 mg, oral, Daily     PRN medications: acetaminophen **OR** acetaminophen **OR** acetaminophen, ondansetron ODT **OR** ondansetron       Relevant Results  Results for orders placed or performed during the hospital encounter of 09/18/24 (from the past 24 hour(s))   CBC and Auto Differential   Result Value Ref Range    WBC 9.2 4.4 - 11.3 x10*3/uL    nRBC 0.0 0.0 - 0.0 /100 WBCs    RBC 4.21 (L) 4.50 - 5.90 x10*6/uL    Hemoglobin 9.9 (L) 13.5 - 17.5 g/dL    Hematocrit 32.7 (L) 41.0 - 52.0 %    MCV 78 (L) 80 - 100 fL    MCH 23.5 (L) 26.0 - 34.0 pg    MCHC 30.3 (L) 32.0 - 36.0 g/dL    RDW 20.3 (H) 11.5 - 14.5 %    Platelets 465 (H) 150 - 450 x10*3/uL    Neutrophils % 71.2 40.0 - 80.0 %    Immature Granulocytes %, Automated 0.4 0.0 - 0.9 %    Lymphocytes % 8.7 13.0 - 44.0 %    Monocytes % 13.2 2.0 - 10.0 %    Eosinophils % 6.1 0.0 - 6.0 %    Basophils % 0.4 0.0 - 2.0 %    Neutrophils Absolute 6.52 (H) 1.60 - 5.50 x10*3/uL    Immature Granulocytes Absolute, Automated 0.04 0.00 - 0.50 x10*3/uL    Lymphocytes Absolute 0.80 0.80 - 3.00 x10*3/uL    Monocytes Absolute 1.21 (H) 0.05 - 0.80 x10*3/uL    Eosinophils Absolute 0.56 (H) 0.00 - 0.40 x10*3/uL    Basophils Absolute 0.04 0.00 - 0.10 x10*3/uL   Protime-INR   Result Value Ref Range    Protime 19.5 (H) 9.3 - 12.7 seconds    INR 2.0 (H) 0.9 - 1.2   Renal Function Panel   Result Value Ref Range    Glucose 83 74 - 99 mg/dL    Sodium 139 136 - 145 mmol/L    Potassium 4.6 3.5 - 5.3  mmol/L    Chloride 106 98 - 107 mmol/L    Bicarbonate 27 21 - 32 mmol/L    Anion Gap 11 10 - 20 mmol/L    Urea Nitrogen 22 6 - 23 mg/dL    Creatinine 1.20 0.50 - 1.30 mg/dL    eGFR 58 (L) >60 mL/min/1.73m*2    Calcium 7.9 (L) 8.6 - 10.3 mg/dL    Phosphorus 3.3 2.5 - 4.9 mg/dL    Albumin 3.0 (L) 3.4 - 5.0 g/dL   Magnesium   Result Value Ref Range    Magnesium 2.21 1.60 - 2.40 mg/dL   Morphology   Result Value Ref Range    RBC Morphology See Below     Polychromasia Mild     Ovalocytes Few     [place] Cells Few       CT chest wo IV contrast  Result Date: 9/19/2024  FINDINGS: Mediastinum demonstrates scattered lymphadenopathy within the paratracheal and AP window locations as well as subcarinal location. Right paratracheal lymphadenopathy identified measures 10 mm in the short axis with findings slightly more conspicuous from prior imaging. Subcarinal lymph node identified measures 11 mm in the short axis. There is no hilar lymphadenopathy within limits of this unenhanced CT. Esophagus is unremarkable   Heart and great vessels demonstrate ascending thoracic aorta to measure 4.0 cm with dilatation as seen on prior imaging. There is moderate vascular calcification of the thoracic aorta. Median sternotomy wires in place with aortic valve replacement demonstrated.   Moderate right and small left basilar pleural effusions. There is bilateral basilar compressive atelectasis. Mild fluid tracking along the right major fissure. There is moderate centrilobular emphysema with component of increased interstitial prominence. There is component of geographic attenuation lung parenchyma with air trapping/pulmonary edema. More focal infiltrate demonstrated in the posterior segment of the right upper lobe.   Included portions visualized abdomen demonstrate exophytic cyst from the superior pole of the left kidney posteriorly measuring 3.6 cm. Cholelithiasis is demonstrated   Visualized osseous structures demonstrate no compression deformity  in the spine. Mild multilevel anterior osteophyte formation demonstrated.  1. Moderate right and mild left basilar pleural effusions with basilar compressive atelectasis. There is component of pulmonary edema demonstrated.   2. Asymmetric infiltrate without airspace consolidation posterior segment of the right upper lobe laterally with postinfectious etiology not excluded   3. Moderate centrilobular emphysema.      XR chest 2 views  Result Date: 9/18/2024  FINDINGS: CARDIOMEDIASTINAL SILHOUETTE AND VASCULATURE:   Cardiac size:  Upper limits of normal, status post median sternotomy. Aortic shadow:  Within normal limits.   Mediastinal contours: Within normal limits.   Pulmonary vasculature:  The central vasculature is unremarkable   LUNGS: There is interstitial prominence particular in the upper lobes since the previous exam. This could be due to development of fibrosis, but interstitial infiltrate from pneumonia and/or congestion would be suspected. There is additional opacity at the right base probably from atelectasis or focal infiltrate.   ABDOMEN AND OTHER FINDINGS: No remarkable upper abdominal findings.   BONES: No acute osseous changes.  1.  Probable upper lobe infiltrates from pneumonia and/or congestion.        Assessment/Plan   Acute hypoxic respiratory failure  Stable on room air  Home oxygen certification prior to discharge  Continuous pulse oximetry  Incentive spirometry/pulmonary hygiene     Hemoptysis  Resolved  Reviewed CT scan chest with collaborating physician Dr. Singh  Continue to monitor hemoptysis, INR  Coumadin per Cardiology    Supra therapeutic INR  Resolving  Coumadin on hold     Acute on chronic systolic congestive heart failure  40 mg oral Lasix daily   Cardiology follow-up     Ex tobacco use  > 50 pack/year history    Stable for discharge after home oxygen certification from a pulmonary standpoint    Yi Meyers, APRN-CNP

## 2024-09-21 NOTE — DISCHARGE SUMMARY
Discharge Diagnosis  Supratherapeutic INR    Issues Requiring Follow-Up  Follow up PCP in a week to monitor BMP,   Advised coumadin clinic follow up on 9/23 Monday to check the INR.   Follow up with cardiology     Discharge Meds     Medication List      START taking these medications     Entresto 24-26 mg tablet; Generic drug: sacubitriL-valsartan; Take 1   tablet by mouth 2 times a day.     CHANGE how you take these medications     furosemide 40 mg tablet; Commonly known as: Lasix; Take 1 tablet (40 mg)   by mouth once daily.; Start taking on: September 22, 2024; What changed:   medication strength, how much to take   warfarin 1 mg tablet; Commonly known as: Coumadin; Take as directed. If   you are unsure how to take this medication, talk to your nurse or doctor.;   Original instructions: Take 1 tablet (1 mg) by mouth see administration   instructions for 14 days. Please check INR on 9/23 in the coumadin   clinic.; What changed: medication strength, how much to take, additional   instructions     CONTINUE taking these medications     amiodarone 200 mg tablet; Commonly known as: Pacerone; Take 1 tablet   (200 mg) by mouth once daily.   ASPIR-81 ORAL   b complex 0.4 mg tablet   clotrimazole-betamethasone cream; Commonly known as: Lotrisone   metoprolol succinate XL 50 mg 24 hr tablet; Commonly known as: Toprol   XL; Take 1 tablet (50 mg) by mouth once daily. Do not crush or chew.   WelChoL 625 mg tablet; Generic drug: colesevelam   ZINC ACETATE ORAL     STOP taking these medications     losartan 100 mg tablet; Commonly known as: Cozaar       Test Results Pending At Discharge  Pending Labs       Order Current Status    Blood Culture Preliminary result    Blood Culture Preliminary result            Hospital Course:  Jose Martin Fleming is a 89 y.o. male with a PMH of aortic stenosis s/p Aortic valve replacement, Afib on Coumadin, CVA, hyperlipidemia, hypertension, who was admitted to the [facility] with acute  hypoxic respiratory failure due to CHF along with hemoptysis, supratherapeutic INR, possible CHF.     Acute hypoxic respiratory failure:   Acute on chronic systolic CHF exacerbation along with hemoptysis  -Echo on 12/26/2023 with EF of 20 to 25% with normal pattern of left ventricular diastolic filling.  -CT scan on 9/19 with concern for possible pneumonia along with moderate right and mild left basilar pleural effusions and pulmonary edema.  -Procal at 0.16.   -Urine Legionella antigen and urine strep pneumoniae antigen are negative.  Initially treated with Rocephin and doxycycline (not giving Zithromax due to interaction with amiodarone prolonging QTc interval).  Advised Doxycycline 100 mg BID x 4 days to complete 7 day course of antibiotic.   -Cardiology advised to change Lasix from 20 to 40 mg daily and  losartan to Entresto 24/26 mg twice daily.  -Pulmonology following recommends incentive spirometry, pulmonary hygiene.  -On room air today, 9/20.       Supratherapeutic INR  -Status post vitamin K.  Restarted on coumadin 2.5 mg daily on 9/20  INR 2.0 on 9/21  Advised coumadin 1 mg daily  Advised follow up in coumadin clinic on 9/23 to check the INR,     History of aortic stenosis s/p valve replacement  -Cardiology following and started patient back on Coumadin today, 9/20,  Previously, patient had been on Coumadin 5 mg every other day alternating with 2.5 mg of over the day as an outpatient.  INR increased from 1.5 to 2.0 in a day  Continue on coumadin at 1 mg daily, ( from 9/21)  Follow up in coumadin clinic on Monday 9/23,        Acute anemia  -Possibly secondary to acute blood loss anemia from hemoptysis.  -Hemoglobin stable at 9.9.   -Monitor with PCP.      Atrial fibrillation  -Restarted on  Coumadin on 9/20 as stated above.  -Continue amiodarone, metoprolol XL  50 mg daily    Acute on chronic systolic CHF exacerbation  -Treating as stated above.  -Continue Entresto, metoprolol XL,   oral Lasix 40 mg daily  as stated above.  -Monitor.     Discharged home in a stable condition. Advised follow up with coumadin clinic on 9/23 and PCP to monitor BMP in a week.   Discharge day management time > 30 minutes.       Pertinent Physical Exam At Time of Discharge:  Vitals:    09/21/24 1006   BP:    Pulse:    Resp:    Temp:    SpO2: 91%       Physical Exam  Constitutional:       Comments: Elderly gentleman, comfortable at rest on RA    HENT:      Head: Normocephalic.      Mouth/Throat:      Mouth: Mucous membranes are moist.   Eyes:      Extraocular Movements: Extraocular movements intact.      Pupils: Pupils are equal, round, and reactive to light.   Cardiovascular:      Rate and Rhythm: Normal rate and regular rhythm.      Heart sounds: Murmur heard.      Comments: ESM noted in AA,   Pulmonary:      Effort: No respiratory distress.      Breath sounds: Normal breath sounds. No wheezing.   Abdominal:      General: There is no distension.      Palpations: Abdomen is soft.      Tenderness: There is no abdominal tenderness.   Musculoskeletal:         General: Normal range of motion.      Cervical back: Normal range of motion.   Skin:     General: Skin is warm.   Neurological:      General: No focal deficit present.      Mental Status: He is alert and oriented to person, place, and time.   Psychiatric:         Mood and Affect: Mood normal.         Outpatient Follow-Up  Future Appointments   Date Time Provider Department Center   9/23/2024  9:45 AM [facility] [id number] CARD1 COAG CLINIC [id number] [facility]   12/13/2024  7:30 AM Ryan Gonzalez MD [id number] [facility]   4/4/2025 10:00 AM Anirudh Rehman MD [id number] [facility]         Phil Valle MD

## 2024-09-21 NOTE — PROGRESS NOTES
09/21/24 1242   Discharge Planning   Expected Discharge Disposition Home   Does the patient need discharge transport arranged? No     MSW was contacted by the nurse stating patient has services at home and wants to resume the same. MSW spoke with nurse, and patient is active with [facility], for aide services only. Patient declines need for nurse or therapy at this time and as such does not require a skilled home care referral. Patient plans to return home upon discharge. Nurse and Dr. Valle updated to the above.

## 2024-09-22 LAB
BACTERIA BLD CULT: NORMAL
BACTERIA BLD CULT: NORMAL

## 2024-09-23 ENCOUNTER — ANTICOAGULATION - WARFARIN VISIT (OUTPATIENT)
Dept: CARDIOLOGY | Facility: CLINIC | Age: 89
End: 2024-09-23
Payer: MEDICARE

## 2024-09-23 ENCOUNTER — TELEPHONE (OUTPATIENT)
Dept: INPATIENT UNIT | Facility: HOSPITAL | Age: 89
End: 2024-09-23

## 2024-09-23 DIAGNOSIS — I48.21 PERMANENT ATRIAL FIBRILLATION (MULTI): ICD-10-CM

## 2024-09-23 DIAGNOSIS — Z95.2 HISTORY OF AORTIC VALVE REPLACEMENT: Primary | ICD-10-CM

## 2024-09-23 LAB
POC INR: 2.6
POC PROTHROMBIN TIME: NORMAL

## 2024-09-23 PROCEDURE — 99211 OFF/OP EST MAY X REQ PHY/QHP: CPT

## 2024-09-23 PROCEDURE — 85610 PROTHROMBIN TIME: CPT | Mod: QW

## 2024-09-23 NOTE — PROGRESS NOTES
Patient identification verified with 2 identifiers.    Location: [facility] - [address] [place], [place] [address] [phone]     Referring Physician: Dr. Anirudh Rehman  Enrollment/ Re-enrollment date: 2025   INR Goal: 2.5-3.5  INR monitoring is per [facility] protocol.  Anticoagulation Medication: warfarin  Indication: Aortic Valve Replacement    Subjective   Bleeding signs/symptoms: No    Bruising: No   Major bleeding event: No  Thrombosis signs/symptoms: No  Thromboembolic event: No  Missed doses: No  Extra doses: No  Medication changes: No  Dietary changes: No  Change in health: No  Change in activity: No  Alcohol: No  Other concerns: No    Upcoming Procedures:  Does the Patient Have any upcoming procedures that require interruption in anticoagulation therapy? no  Does the patient require bridging? no      Anticoagulation Summary  As of 2024      INR goal:  2.5-3.5   TTR:  44.9% (11.3 mo)   INR used for dosin.60 (2024)   Weekly warfarin total:  20 mg               Assessment/Plan   Therapeutic     1. New dose: no change    2. Next INR:  2 days      Education provided to patient during the visit:  Patient instructed to call in interim with questions, concerns and changes.   Patient educated on interactions between medications and warfarin.   Patient educated on dietary consistency in vitamin k consumption.   Patient educated on affects of alcohol consumption while taking warfarin.   Patient educated on signs of bleeding/clotting.   Patient educated on compliance with dosing, follow up appointments, and prescribed plan of care.

## 2024-09-24 ENCOUNTER — PATIENT OUTREACH (OUTPATIENT)
Dept: PRIMARY CARE | Facility: CLINIC | Age: 89
End: 2024-09-24
Payer: MEDICARE

## 2024-09-24 ENCOUNTER — DOCUMENTATION (OUTPATIENT)
Dept: HOME HEALTH SERVICES | Facility: HOME HEALTH | Age: 89
End: 2024-09-24
Payer: MEDICARE

## 2024-09-24 ENCOUNTER — TELEPHONE (OUTPATIENT)
Dept: INPATIENT UNIT | Facility: HOSPITAL | Age: 89
End: 2024-09-24
Payer: MEDICARE

## 2024-09-24 ENCOUNTER — HOME HEALTH ADMISSION (OUTPATIENT)
Dept: HOME HEALTH SERVICES | Facility: HOME HEALTH | Age: 89
End: 2024-09-24
Payer: MEDICARE

## 2024-09-24 DIAGNOSIS — I50.20 SYSTOLIC CONGESTIVE HEART FAILURE, UNSPECIFIED HF CHRONICITY: Primary | ICD-10-CM

## 2024-09-24 LAB
BACTERIA BLD CULT: NORMAL
BACTERIA BLD CULT: NORMAL

## 2024-09-24 NOTE — PROGRESS NOTES
Discharge Facility: [facility][note 3]  Discharge Diagnosis: Supratherapeutic INR   Admission Date: 9/18/24  Discharge Date: 9/21/24    PCP Appointment Date: None available. Task to office  Specialist Appointment Date: Unknown  Hospital Encounter and Summary Linked: Yes    See discharge assessment below for further details    Engagement  Call Start Time: 1043 (9/24/2024 10:44 AM)    Medications  Medications reviewed with patient/caregiver?: Yes (9/24/2024 10:44 AM)  Is the patient having any side effects they believe may be caused by any medication additions or changes?: No (9/24/2024 10:44 AM)  Does the patient have all medications ordered at discharge?: Yes (9/24/2024 10:44 AM)  Care Management Interventions: No intervention needed (9/24/2024 10:44 AM)  Prescription Comments: pt sent home with script (9/24/2024 10:44 AM)  Is the patient taking all medications as directed (includes completed medication regime)?: Yes (9/24/2024 10:44 AM)  Care Management Interventions: Provided patient education (9/24/2024 10:44 AM)  Medication Comments: Pt denies problems obtaining or affording medication (9/24/2024 10:44 AM)    Appointments  Does the patient have a primary care provider?: Yes (No available appt. Task to office) (9/24/2024 10:44 AM)  Care Management Interventions: Educated patient on importance of making appointment (9/24/2024 10:44 AM)  Has the patient kept scheduled appointments due by today?: Yes (9/24/2024 10:44 AM)  Care Management Interventions: Educated on importance of keeping appointment (9/24/2024 10:44 AM)    Self Management  What is the home health agency?: n/a (9/24/2024 10:44 AM)  Has home health visited the patient within 72 hours of discharge?: Not applicable (9/24/2024 10:44 AM)    Patient Teaching  Does the patient have access to their discharge instructions?: Yes (9/24/2024 10:44 AM)  Care Management Interventions: Reviewed instructions with patient (9/24/2024 10:44 AM)  What is the  patient's perception of their health status since discharge?: Improving (9/24/2024 10:44 AM)  Is the patient/caregiver able to teach back the hierarchy of who to call/visit for symptoms/problems? PCP, Specialist, Home Health nurse, Urgent Care, ED, 911: Yes (9/24/2024 10:44 AM)    Wrap Up  Wrap Up Additional Comments: This CM spoke with pt via phone. Pt reports doing well at home since discharge. New meds reviewed. Pt denies CP and SOB. Pt aware of my availability for non-emergent concerns. Contact info provided to patient (9/24/2024 10:44 AM)      Matty Ashley LPN

## 2024-09-24 NOTE — PROGRESS NOTES
See message - needs home care s/p hospital stay - initially refused but then wanted after discharge.

## 2024-09-24 NOTE — SIGNIFICANT EVENT
9/24/24 0945:  Received update from inpatient management team that patient is now requesting home care post discharge.      Internal referral received for [facility] by PCP Dr. Gonzalez.      Message sent to LALY Serrano with [facility] to review and process for SOC.       UPDATE 3347:  Received the following response from [facility], SOC 09/26-09/27

## 2024-09-24 NOTE — HH CARE COORDINATION
Home Care received a Referral for Nursing and Physical Therapy. We have processed the referral for a Start of Care on 09/26-09/27.     If you have any questions or concerns, please feel free to contact us at [phone]. Follow the prompts, enter your five digit zip code, and you will be directed to your care team on [facility] 1.

## 2024-09-25 ENCOUNTER — ANTICOAGULATION - WARFARIN VISIT (OUTPATIENT)
Dept: CARDIOLOGY | Facility: CLINIC | Age: 89
End: 2024-09-25
Payer: MEDICARE

## 2024-09-25 DIAGNOSIS — I48.21 PERMANENT ATRIAL FIBRILLATION (MULTI): ICD-10-CM

## 2024-09-25 DIAGNOSIS — Z95.2 HISTORY OF AORTIC VALVE REPLACEMENT: Primary | ICD-10-CM

## 2024-09-25 LAB
POC INR: 2.3 (ref 2.5–3.5)
POC PROTHROMBIN TIME: ABNORMAL

## 2024-09-25 PROCEDURE — 99211 OFF/OP EST MAY X REQ PHY/QHP: CPT

## 2024-09-25 PROCEDURE — 85610 PROTHROMBIN TIME: CPT | Mod: QW

## 2024-09-25 NOTE — PROGRESS NOTES
Patient identification verified with 2 identifiers.    Location: [facility] - [address] [place] [id number] [phone]     Referring Physician: Dr. Anirudh Rehman  Enrollment/ Re-enrollment date: 2025   INR Goal: 2.5-3.5  INR monitoring is per [facility] protocol.  Anticoagulation Medication: warfarin  Indication: Aortic Valve Replacement    Subjective   Bleeding signs/symptoms: No    Bruising: No   Major bleeding event: No  Thrombosis signs/symptoms: No  Thromboembolic event: No  Missed doses: No  Extra doses: No  Medication changes: No  Dietary changes: No  Change in health: No  Change in activity: No  Alcohol: No  Other concerns: No    Upcoming Procedures:  Does the Patient Have any upcoming procedures that require interruption in anticoagulation therapy? no  Does the patient require bridging? no      Anticoagulation Summary  As of 2024      INR goal:  2.5-3.5   TTR:  44.8% (11.4 mo)   INR used for dosin.30 (2024)   Weekly warfarin total:  7.5 mg               Assessment/Plan   Subtherapeutic     1. New dose:  TWD increased approximately 5% per protocol     2. Next INR:  2 days      Education provided to patient during the visit:  Patient instructed to call in interim with questions, concerns and changes.   Patient educated on interactions between medications and warfarin.   Patient educated on dietary consistency in vitamin k consumption.   Patient educated on affects of alcohol consumption while taking warfarin.   Patient educated on signs of bleeding/clotting.   Patient educated on compliance with dosing, follow up appointments, and prescribed plan of care.

## 2024-09-26 ENCOUNTER — TELEPHONE (OUTPATIENT)
Dept: HOME HEALTH SERVICES | Facility: HOME HEALTH | Age: 89
End: 2024-09-26
Payer: MEDICARE

## 2024-09-26 NOTE — TELEPHONE ENCOUNTER
Good afternoon Dr. Gonzalez - I wanted to inform you that your patient has declined homecare services at this time. If patient changes their mind and would like homecare, a new referral can be sent in at that time. Please let me know if there are any questions or concerns. Thank you!

## 2024-09-27 ENCOUNTER — OFFICE VISIT (OUTPATIENT)
Dept: CARDIOLOGY | Facility: CLINIC | Age: 89
End: 2024-09-27
Payer: MEDICARE

## 2024-09-27 ENCOUNTER — ANTICOAGULATION - WARFARIN VISIT (OUTPATIENT)
Dept: CARDIOLOGY | Facility: CLINIC | Age: 89
End: 2024-09-27
Payer: MEDICARE

## 2024-09-27 ENCOUNTER — LAB (OUTPATIENT)
Dept: LAB | Facility: LAB | Age: 89
End: 2024-09-27
Payer: MEDICARE

## 2024-09-27 VITALS
DIASTOLIC BLOOD PRESSURE: 58 MMHG | OXYGEN SATURATION: 95 % | WEIGHT: 140 LBS | BODY MASS INDEX: 21.22 KG/M2 | HEIGHT: 68 IN | SYSTOLIC BLOOD PRESSURE: 110 MMHG | HEART RATE: 62 BPM

## 2024-09-27 DIAGNOSIS — R94.31 ABNORMAL EKG: Primary | ICD-10-CM

## 2024-09-27 DIAGNOSIS — I48.21 PERMANENT ATRIAL FIBRILLATION (MULTI): ICD-10-CM

## 2024-09-27 DIAGNOSIS — R94.31 ABNORMAL EKG: ICD-10-CM

## 2024-09-27 DIAGNOSIS — Z95.2 HISTORY OF AORTIC VALVE REPLACEMENT: Primary | ICD-10-CM

## 2024-09-27 LAB
ANION GAP SERPL CALC-SCNC: 16 MMOL/L (ref 10–20)
BUN SERPL-MCNC: 31 MG/DL (ref 6–23)
CALCIUM SERPL-MCNC: 9.4 MG/DL (ref 8.6–10.6)
CHLORIDE SERPL-SCNC: 105 MMOL/L (ref 98–107)
CO2 SERPL-SCNC: 27 MMOL/L (ref 21–32)
CREAT SERPL-MCNC: 1.44 MG/DL (ref 0.5–1.3)
EGFRCR SERPLBLD CKD-EPI 2021: 46 ML/MIN/1.73M*2
ERYTHROCYTE [DISTWIDTH] IN BLOOD BY AUTOMATED COUNT: 19.9 % (ref 11.5–14.5)
GLUCOSE SERPL-MCNC: 80 MG/DL (ref 74–99)
HCT VFR BLD AUTO: 39.4 % (ref 41–52)
HGB BLD-MCNC: 11.6 G/DL (ref 13.5–17.5)
MCH RBC QN AUTO: 23.7 PG (ref 26–34)
MCHC RBC AUTO-ENTMCNC: 29.4 G/DL (ref 32–36)
MCV RBC AUTO: 80 FL (ref 80–100)
NRBC BLD-RTO: 0 /100 WBCS (ref 0–0)
PLATELET # BLD AUTO: 496 X10*3/UL (ref 150–450)
POC INR: 3.8
POC PROTHROMBIN TIME: NORMAL
POTASSIUM SERPL-SCNC: 5.4 MMOL/L (ref 3.5–5.3)
RBC # BLD AUTO: 4.9 X10*6/UL (ref 4.5–5.9)
SODIUM SERPL-SCNC: 143 MMOL/L (ref 136–145)
WBC # BLD AUTO: 9.9 X10*3/UL (ref 4.4–11.3)

## 2024-09-27 PROCEDURE — 3074F SYST BP LT 130 MM HG: CPT | Performed by: NURSE PRACTITIONER

## 2024-09-27 PROCEDURE — 1126F AMNT PAIN NOTED NONE PRSNT: CPT | Performed by: NURSE PRACTITIONER

## 2024-09-27 PROCEDURE — 1160F RVW MEDS BY RX/DR IN RCRD: CPT | Performed by: NURSE PRACTITIONER

## 2024-09-27 PROCEDURE — 1159F MED LIST DOCD IN RCRD: CPT | Performed by: NURSE PRACTITIONER

## 2024-09-27 PROCEDURE — 1157F ADVNC CARE PLAN IN RCRD: CPT | Performed by: NURSE PRACTITIONER

## 2024-09-27 PROCEDURE — 1036F TOBACCO NON-USER: CPT | Performed by: NURSE PRACTITIONER

## 2024-09-27 PROCEDURE — 80048 BASIC METABOLIC PNL TOTAL CA: CPT

## 2024-09-27 PROCEDURE — 1111F DSCHRG MED/CURRENT MED MERGE: CPT | Performed by: NURSE PRACTITIONER

## 2024-09-27 PROCEDURE — 99214 OFFICE O/P EST MOD 30 MIN: CPT | Performed by: NURSE PRACTITIONER

## 2024-09-27 PROCEDURE — 36415 COLL VENOUS BLD VENIPUNCTURE: CPT

## 2024-09-27 PROCEDURE — 85027 COMPLETE CBC AUTOMATED: CPT

## 2024-09-27 PROCEDURE — 85610 PROTHROMBIN TIME: CPT | Mod: QW

## 2024-09-27 PROCEDURE — 3078F DIAST BP <80 MM HG: CPT | Performed by: NURSE PRACTITIONER

## 2024-09-27 PROCEDURE — 99211 OFF/OP EST MAY X REQ PHY/QHP: CPT

## 2024-09-27 ASSESSMENT — ENCOUNTER SYMPTOMS
MUSCULOSKELETAL NEGATIVE: 1
CONSTITUTIONAL NEGATIVE: 1
RESPIRATORY NEGATIVE: 1
LOSS OF SENSATION IN FEET: 0
NEUROLOGICAL NEGATIVE: 1
OCCASIONAL FEELINGS OF UNSTEADINESS: 0
GASTROINTESTINAL NEGATIVE: 1
DEPRESSION: 0
CARDIOVASCULAR NEGATIVE: 1

## 2024-09-27 ASSESSMENT — PAIN SCALES - GENERAL: PAINLEVEL: 0-NO PAIN
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
## 2024-09-27 NOTE — PROGRESS NOTES
Patient identification verified with 2 identifiers.    Location: [facility] - [address] [place], [place] [address] [phone]     Referring Physician: Dr. Anirudh Rehman  Enrollment/ Re-enrollment date: 5/1/2025   INR Goal: 2.5-3.5  INR monitoring is per [facility] protocol.  Anticoagulation Medication: warfarin  Indication: Aortic Valve Replacement    Subjective   Bleeding signs/symptoms: No    Bruising: No   Major bleeding event: No  Thrombosis signs/symptoms: No  Thromboembolic event: No  Missed doses: No  Extra doses: No  Medication changes: No  Dietary changes: No  Change in health: No  Change in activity: No  Alcohol: No  Other concerns: No    Upcoming Procedures:  Does the Patient Have any upcoming procedures that require interruption in anticoagulation therapy? no  Does the patient require bridging? no      Anticoagulation Summary  As of 9/27/2024      INR goal:  2.5-3.5   TTR:  44.9% (11.4 mo)   INR used for dosing:  3.80 (9/27/2024)   Weekly warfarin total:  7 mg               Assessment/Plan   Supratherapeutic     1. New dose:  Reduce TWD 5%     2. Next INR:  3 days      Education provided to patient during the visit:  Patient instructed to call in interim with questions, concerns and changes.   Patient educated on interactions between medications and warfarin.   Patient educated on dietary consistency in vitamin k consumption.   Patient educated on affects of alcohol consumption while taking warfarin.   Patient educated on signs of bleeding/clotting.   Patient educated on compliance with dosing, follow up appointments, and prescribed plan of care.

## 2024-09-30 ENCOUNTER — ANTICOAGULATION - WARFARIN VISIT (OUTPATIENT)
Dept: CARDIOLOGY | Facility: CLINIC | Age: 89
End: 2024-09-30
Payer: MEDICARE

## 2024-09-30 DIAGNOSIS — Z95.2 HISTORY OF AORTIC VALVE REPLACEMENT: Primary | ICD-10-CM

## 2024-09-30 DIAGNOSIS — I48.21 PERMANENT ATRIAL FIBRILLATION (MULTI): ICD-10-CM

## 2024-09-30 DIAGNOSIS — I63.9 CEREBROVASCULAR ACCIDENT (CVA), UNSPECIFIED MECHANISM (MULTI): Primary | ICD-10-CM

## 2024-09-30 DIAGNOSIS — I50.20 SYSTOLIC CONGESTIVE HEART FAILURE, UNSPECIFIED HF CHRONICITY: ICD-10-CM

## 2024-09-30 LAB
POC INR: 2.4
POC PROTHROMBIN TIME: NORMAL

## 2024-09-30 PROCEDURE — 85610 PROTHROMBIN TIME: CPT | Mod: QW

## 2024-09-30 PROCEDURE — 99211 OFF/OP EST MAY X REQ PHY/QHP: CPT

## 2024-09-30 NOTE — PROGRESS NOTES
Patient identification verified with 2 identifiers.    Location: [facility] - [address] [place], [place] [address] [phone]     Referring Physician: Dr. Anirudh Rehman  Enrollment/ Re-enrollment date: 2025   INR Goal: 2.5-3.5  INR monitoring is per [facility] protocol.  Anticoagulation Medication: warfarin  Indication: Aortic Valve Replacement    Subjective   Bleeding signs/symptoms: No    Bruising: No   Major bleeding event: No  Thrombosis signs/symptoms: No  Thromboembolic event: No  Missed doses: No  Extra doses: No  Medication changes: No  Dietary changes: No  Change in health: No  Change in activity: No  Alcohol: No  Other concerns: No    Upcoming Procedures:  Does the Patient Have any upcoming procedures that require interruption in anticoagulation therapy? no  Does the patient require bridging? no      Anticoagulation Summary  As of 2024      INR goal:  2.5-3.5   TTR:  45.2% (11.5 mo)   INR used for dosin.40 (2024)   Weekly warfarin total:  6.5 mg               Assessment/Plan   Subtherapeutic     1. New dose:  Will readjust dosing over the past week.      2. Next INR:  4 days      Education provided to patient during the visit:  Patient instructed to call in interim with questions, concerns and changes.   Patient educated on interactions between medications and warfarin.   Patient educated on dietary consistency in vitamin k consumption.   Patient educated on affects of alcohol consumption while taking warfarin.   Patient educated on signs of bleeding/clotting.   Patient educated on compliance with dosing, follow up appointments, and prescribed plan of care.

## 2024-10-04 ENCOUNTER — ANTICOAGULATION - WARFARIN VISIT (OUTPATIENT)
Dept: CARDIOLOGY | Facility: CLINIC | Age: 89
End: 2024-10-04
Payer: MEDICARE

## 2024-10-04 ENCOUNTER — PATIENT OUTREACH (OUTPATIENT)
Dept: PRIMARY CARE | Facility: CLINIC | Age: 89
End: 2024-10-04
Payer: MEDICARE

## 2024-10-04 DIAGNOSIS — I48.21 PERMANENT ATRIAL FIBRILLATION (MULTI): ICD-10-CM

## 2024-10-04 DIAGNOSIS — Z95.2 HISTORY OF AORTIC VALVE REPLACEMENT: Primary | ICD-10-CM

## 2024-10-04 DIAGNOSIS — I48.91 ATRIAL FIBRILLATION, UNSPECIFIED TYPE (MULTI): ICD-10-CM

## 2024-10-04 LAB
POC INR: 1.6
POC PROTHROMBIN TIME: NORMAL

## 2024-10-04 PROCEDURE — 85610 PROTHROMBIN TIME: CPT | Mod: QW

## 2024-10-04 PROCEDURE — 99211 OFF/OP EST MAY X REQ PHY/QHP: CPT

## 2024-10-04 RX ORDER — WARFARIN 1 MG/1
1 TABLET ORAL SEE ADMIN INSTRUCTIONS
Qty: 30 TABLET | Refills: 0 | Status: SHIPPED | OUTPATIENT
Start: 2024-10-04

## 2024-10-04 NOTE — PROGRESS NOTES
Patient identification verified with 2 identifiers.    Location: [facility] - [address] [place], [place] [address] [phone]     Referring Physician: Dr. Anirudh Rehman  Enrollment/ Re-enrollment date: 2025   INR Goal: 2.5-3.5  INR monitoring is per [facility] protocol.  Anticoagulation Medication: warfarin  Indication: Aortic Valve Replacement    Subjective   Bleeding signs/symptoms: No    Bruising: No   Major bleeding event: No  Thrombosis signs/symptoms: No  Thromboembolic event: No  Missed doses: No  Extra doses: No  Medication changes: No  Dietary changes: No  Change in health: No  Change in activity: No  Alcohol: No  Other concerns: No    Upcoming Procedures:  Does the Patient Have any upcoming procedures that require interruption in anticoagulation therapy? no  Does the patient require bridging? no      Anticoagulation Summary  As of 10/4/2024      INR goal:  2.5-3.5   TTR:  44.7% (11.7 mo)   INR used for dosin.60 (10/4/2024)   Weekly warfarin total:  10 mg               Assessment/Plan   Subtherapeutic     1. New dose:  Increase TWD.       2. Next INR:  5 days      Education provided to patient during the visit:  Patient instructed to call in interim with questions, concerns and changes.   Patient educated on interactions between medications and warfarin.   Patient educated on dietary consistency in vitamin k consumption.   Patient educated on affects of alcohol consumption while taking warfarin.   Patient educated on signs of bleeding/clotting.   Patient educated on compliance with dosing, follow up appointments, and prescribed plan of care.

## 2024-10-04 NOTE — PROGRESS NOTES
Call regarding appt. with PCP on (n/a) after hospitalization.  At time of outreach call the patient feels as if their condition has (improved) since last visit.  Reviewed the PCP appointment with the pt and addressed any questions or concerns.      Matty Ashley LPN

## 2024-10-09 ENCOUNTER — ANTICOAGULATION - WARFARIN VISIT (OUTPATIENT)
Dept: CARDIOLOGY | Facility: CLINIC | Age: 89
End: 2024-10-09
Payer: MEDICARE

## 2024-10-09 DIAGNOSIS — Z95.2 HISTORY OF AORTIC VALVE REPLACEMENT: Primary | ICD-10-CM

## 2024-10-09 DIAGNOSIS — I48.21 PERMANENT ATRIAL FIBRILLATION (MULTI): ICD-10-CM

## 2024-10-09 LAB
POC INR: 4.9
POC PROTHROMBIN TIME: NORMAL

## 2024-10-09 PROCEDURE — 99211 OFF/OP EST MAY X REQ PHY/QHP: CPT

## 2024-10-09 PROCEDURE — 85610 PROTHROMBIN TIME: CPT | Mod: QW

## 2024-10-09 NOTE — PROGRESS NOTES
Patient identification verified with 2 identifiers.    Location: [facility] - [address] [place], [place] [address] [phone]     Referring Physician: Dr. Anirudh Rehman  Enrollment/ Re-enrollment date: 2025   INR Goal: 2.5-3.5  INR monitoring is per [facility] protocol.  Anticoagulation Medication: warfarin  Indication: Aortic Valve Replacement    Subjective   Bleeding signs/symptoms: No    Bruising: No   Major bleeding event: No  Thrombosis signs/symptoms: No  Thromboembolic event: No  Missed doses: No  Extra doses: No  Medication changes: No  Dietary changes: No  Change in health: No  Change in activity: No  Alcohol: No  Other concerns: No    Upcoming Procedures:  Does the Patient Have any upcoming procedures that require interruption in anticoagulation therapy? no  Does the patient require bridging? no      Anticoagulation Summary  As of 10/9/2024      INR goal:  2.5-3.5   TTR:  44.4% (11.8 mo)   INR used for dosin.90 (10/9/2024)   Weekly warfarin total:  10 mg               Assessment/Plan   Supra-therapeutic     1. New dose:  hold dose for 2 days and RTC in 3 days     2. Next INR: Friday      Education provided to patient during the visit:  Patient instructed to call in interim with questions, concerns and changes.   Patient educated on interactions between medications and warfarin.   Patient educated on dietary consistency in vitamin k consumption.   Patient educated on affects of alcohol consumption while taking warfarin.   Patient educated on signs of bleeding/clotting.   Patient educated on compliance with dosing, follow up appointments, and prescribed plan of care.

## 2024-10-11 ENCOUNTER — ANTICOAGULATION - WARFARIN VISIT (OUTPATIENT)
Dept: CARDIOLOGY | Facility: CLINIC | Age: 89
End: 2024-10-11
Payer: MEDICARE

## 2024-10-11 DIAGNOSIS — I48.21 PERMANENT ATRIAL FIBRILLATION (MULTI): ICD-10-CM

## 2024-10-11 DIAGNOSIS — Z95.2 HISTORY OF AORTIC VALVE REPLACEMENT: Primary | ICD-10-CM

## 2024-10-11 LAB
POC INR: 3
POC PROTHROMBIN TIME: NORMAL

## 2024-10-11 PROCEDURE — 99211 OFF/OP EST MAY X REQ PHY/QHP: CPT

## 2024-10-11 PROCEDURE — 85610 PROTHROMBIN TIME: CPT | Mod: QW

## 2024-10-11 NOTE — PROGRESS NOTES
Patient identification verified with 2 identifiers.    Location: [facility] - [address] [place] [id number] [phone]     Referring Physician: Dr. Anirudh Rehman  Enrollment/ Re-enrollment date: 5/1/2025   INR Goal: 2.5-3.5  INR monitoring is per [facility] protocol.  Anticoagulation Medication: warfarin  Indication: Aortic Valve Replacement    Subjective   Bleeding signs/symptoms: No    Bruising: No   Major bleeding event: No  Thrombosis signs/symptoms: No  Thromboembolic event: No  Missed doses: No  Extra doses: No  Medication changes: No  Dietary changes: No  Change in health: No  Change in activity: No  Alcohol: No  Other concerns: No    Upcoming Procedures:  Does the Patient Have any upcoming procedures that require interruption in anticoagulation therapy? no  Does the patient require bridging? no      Anticoagulation Summary  As of 10/11/2024      INR goal:  2.5-3.5   TTR:  44.3% (11.9 mo)   INR used for dosing:  3.00 (10/11/2024)   Weekly warfarin total:  9 mg               Assessment/Plan   therapeutic     1. New dose: restart dose.    2. Next INR: 1 week      Education provided to patient during the visit:  Patient instructed to call in interim with questions, concerns and changes.   Patient educated on interactions between medications and warfarin.   Patient educated on dietary consistency in vitamin k consumption.   Patient educated on affects of alcohol consumption while taking warfarin.   Patient educated on signs of bleeding/clotting.   Patient educated on compliance with dosing, follow up appointments, and prescribed plan of care.

## 2024-10-17 ENCOUNTER — TELEPHONE (OUTPATIENT)
Dept: CARDIOLOGY | Facility: CLINIC | Age: 89
End: 2024-10-17

## 2024-10-17 DIAGNOSIS — I48.91 ATRIAL FIBRILLATION AND FLUTTER: ICD-10-CM

## 2024-10-17 DIAGNOSIS — I48.92 ATRIAL FIBRILLATION AND FLUTTER: ICD-10-CM

## 2024-10-17 RX ORDER — AMIODARONE HYDROCHLORIDE 200 MG/1
200 TABLET ORAL DAILY
Qty: 90 TABLET | Refills: 1 | Status: SHIPPED | OUTPATIENT
Start: 2024-10-17 | End: 2025-10-17

## 2024-10-18 ENCOUNTER — APPOINTMENT (OUTPATIENT)
Dept: CARDIOLOGY | Facility: CLINIC | Age: 89
End: 2024-10-18
Payer: MEDICARE

## 2024-10-18 DIAGNOSIS — I48.21 PERMANENT ATRIAL FIBRILLATION (MULTI): ICD-10-CM

## 2024-10-18 DIAGNOSIS — Z95.2 HISTORY OF AORTIC VALVE REPLACEMENT: Primary | ICD-10-CM

## 2024-10-18 LAB
POC INR: 1.4
POC PROTHROMBIN TIME: NORMAL

## 2024-10-18 PROCEDURE — 85610 PROTHROMBIN TIME: CPT | Mod: QW

## 2024-10-18 PROCEDURE — 99211 OFF/OP EST MAY X REQ PHY/QHP: CPT

## 2024-10-18 NOTE — PROGRESS NOTES
Patient identification verified with 2 identifiers.    Location: [facility] - [address] [place], [place] [address] [phone]     Referring Physician: Dr. Anirudh Rehman  Enrollment/ Re-enrollment date: 2025   INR Goal: 2.5-3.5  INR monitoring is per [facility] protocol.  Anticoagulation Medication: warfarin  Indication: Aortic Valve Replacement    Subjective   Bleeding signs/symptoms: No    Bruising: No   Major bleeding event: No  Thrombosis signs/symptoms: No  Thromboembolic event: No  Missed doses: No  Extra doses: No  Medication changes: No  Dietary changes: No  Change in health: No  Change in activity: No  Alcohol: No  Other concerns: No    Upcoming Procedures:  Does the Patient Have any upcoming procedures that require interruption in anticoagulation therapy? no  Does the patient require bridging? no      Anticoagulation Summary  As of 10/18/2024      INR goal:  2.5-3.5   TTR:  44.1% (1 y)   INR used for dosin.40 (10/18/2024)   Weekly warfarin total:  8 mg               Assessment/Plan   Sub-therapeutic     1. New dose: maintain and start a Multi-vitamin.    2. Next INR: 1 week      Education provided to patient during the visit:  Patient instructed to call in interim with questions, concerns and changes.   Patient educated on interactions between medications and warfarin.   Patient educated on dietary consistency in vitamin k consumption.   Patient educated on affects of alcohol consumption while taking warfarin.   Patient educated on signs of bleeding/clotting.   Patient educated on compliance with dosing, follow up appointments, and prescribed plan of care.

## 2024-10-23 RX ORDER — AMIODARONE HYDROCHLORIDE 200 MG/1
200 TABLET ORAL DAILY
Qty: 90 TABLET | Refills: 3 | OUTPATIENT
Start: 2024-10-23

## 2024-10-25 ENCOUNTER — APPOINTMENT (OUTPATIENT)
Dept: CARDIOLOGY | Facility: CLINIC | Age: 89
End: 2024-10-25
Payer: MEDICARE

## 2024-10-28 ENCOUNTER — APPOINTMENT (OUTPATIENT)
Dept: CARDIOLOGY | Facility: CLINIC | Age: 89
End: 2024-10-28
Payer: MEDICARE

## 2024-10-28 DIAGNOSIS — Z95.2 HISTORY OF AORTIC VALVE REPLACEMENT: Primary | ICD-10-CM

## 2024-10-28 DIAGNOSIS — I48.21 PERMANENT ATRIAL FIBRILLATION (MULTI): ICD-10-CM

## 2024-10-28 LAB
POC INR: 1.3
POC PROTHROMBIN TIME: NORMAL

## 2024-10-28 PROCEDURE — 99211 OFF/OP EST MAY X REQ PHY/QHP: CPT

## 2024-10-28 PROCEDURE — 85610 PROTHROMBIN TIME: CPT | Mod: QW

## 2024-10-29 ENCOUNTER — PATIENT OUTREACH (OUTPATIENT)
Dept: PRIMARY CARE | Facility: CLINIC | Age: 89
End: 2024-10-29
Payer: MEDICARE

## 2024-11-04 ENCOUNTER — ANTICOAGULATION - WARFARIN VISIT (OUTPATIENT)
Dept: CARDIOLOGY | Facility: CLINIC | Age: 89
End: 2024-11-04
Payer: MEDICARE

## 2024-11-04 DIAGNOSIS — Z95.2 HISTORY OF AORTIC VALVE REPLACEMENT: Primary | ICD-10-CM

## 2024-11-04 DIAGNOSIS — I48.21 PERMANENT ATRIAL FIBRILLATION (MULTI): ICD-10-CM

## 2024-11-04 LAB
POC INR: 1.3
POC PROTHROMBIN TIME: NORMAL

## 2024-11-04 PROCEDURE — 99211 OFF/OP EST MAY X REQ PHY/QHP: CPT

## 2024-11-04 PROCEDURE — 85610 PROTHROMBIN TIME: CPT | Mod: QW

## 2024-11-04 NOTE — PROGRESS NOTES
Patient identification verified with 2 identifiers.    Location: [facility] - [address] [place] [id number] [phone]     Referring Physician: Dr. Anirudh Rehman  Enrollment/ Re-enrollment date: 2025   INR Goal: 2.5-3.5  INR monitoring is per [facility] protocol.  Anticoagulation Medication: warfarin  Indication: Aortic Valve Replacement    Subjective   Bleeding signs/symptoms: No    Bruising: No   Major bleeding event: No  Thrombosis signs/symptoms: No  Thromboembolic event: No  Missed doses: No  Extra doses: No  Medication changes: No  Dietary changes: No  Change in health: No  Change in activity: No  Alcohol: No  Other concerns: No    Upcoming Procedures:  Does the Patient Have any upcoming procedures that require interruption in anticoagulation therapy? no  Does the patient require bridging? no      Anticoagulation Summary  As of 2024      INR goal:  2.5-3.5   TTR:  42.2% (1 y)   INR used for dosin.30 (2024)   Weekly warfarin total:  11 mg               Assessment/Plan   Subtherapeutic     1. New dose:  Increase TWD     2. Next INR: 1 week      Education provided to patient during the visit:  Patient instructed to call in interim with questions, concerns and changes.   Patient educated on interactions between medications and warfarin.   Patient educated on dietary consistency in vitamin k consumption.   Patient educated on affects of alcohol consumption while taking warfarin.   Patient educated on signs of bleeding/clotting.   Patient educated on compliance with dosing, follow up appointments, and prescribed plan of care.

## 2024-11-08 ENCOUNTER — OFFICE VISIT (OUTPATIENT)
Dept: CARDIOLOGY | Facility: CLINIC | Age: 89
End: 2024-11-08
Payer: MEDICARE

## 2024-11-08 ENCOUNTER — ANTICOAGULATION - WARFARIN VISIT (OUTPATIENT)
Dept: CARDIOLOGY | Facility: CLINIC | Age: 89
End: 2024-11-08
Payer: MEDICARE

## 2024-11-08 VITALS — DIASTOLIC BLOOD PRESSURE: 80 MMHG | HEART RATE: 59 BPM | OXYGEN SATURATION: 96 % | SYSTOLIC BLOOD PRESSURE: 140 MMHG

## 2024-11-08 DIAGNOSIS — Z51.81 ANTICOAGULATION MANAGEMENT ENCOUNTER: ICD-10-CM

## 2024-11-08 DIAGNOSIS — I50.20 SYSTOLIC CONGESTIVE HEART FAILURE, UNSPECIFIED HF CHRONICITY: ICD-10-CM

## 2024-11-08 DIAGNOSIS — Z95.2 HISTORY OF AORTIC VALVE REPLACEMENT: Primary | ICD-10-CM

## 2024-11-08 DIAGNOSIS — I48.21 PERMANENT ATRIAL FIBRILLATION (MULTI): ICD-10-CM

## 2024-11-08 DIAGNOSIS — Z79.01 ANTICOAGULATION MANAGEMENT ENCOUNTER: ICD-10-CM

## 2024-11-08 LAB
POC INR: 1.7
POC PROTHROMBIN TIME: NORMAL

## 2024-11-08 PROCEDURE — 99214 OFFICE O/P EST MOD 30 MIN: CPT | Performed by: NURSE PRACTITIONER

## 2024-11-08 PROCEDURE — 1160F RVW MEDS BY RX/DR IN RCRD: CPT | Performed by: NURSE PRACTITIONER

## 2024-11-08 PROCEDURE — 99211 OFF/OP EST MAY X REQ PHY/QHP: CPT

## 2024-11-08 PROCEDURE — 3079F DIAST BP 80-89 MM HG: CPT | Performed by: NURSE PRACTITIONER

## 2024-11-08 PROCEDURE — 3077F SYST BP >= 140 MM HG: CPT | Performed by: NURSE PRACTITIONER

## 2024-11-08 PROCEDURE — 1157F ADVNC CARE PLAN IN RCRD: CPT | Performed by: NURSE PRACTITIONER

## 2024-11-08 PROCEDURE — 1036F TOBACCO NON-USER: CPT | Performed by: NURSE PRACTITIONER

## 2024-11-08 PROCEDURE — 85610 PROTHROMBIN TIME: CPT | Mod: QW

## 2024-11-08 PROCEDURE — 1126F AMNT PAIN NOTED NONE PRSNT: CPT | Performed by: NURSE PRACTITIONER

## 2024-11-08 PROCEDURE — 1159F MED LIST DOCD IN RCRD: CPT | Performed by: NURSE PRACTITIONER

## 2024-11-08 RX ORDER — FUROSEMIDE 40 MG/1
40 TABLET ORAL DAILY
Qty: 90 TABLET | Refills: 3 | Status: SHIPPED | OUTPATIENT
Start: 2024-11-08 | End: 2025-11-08

## 2024-11-08 ASSESSMENT — ENCOUNTER SYMPTOMS
DEPRESSION: 0
OCCASIONAL FEELINGS OF UNSTEADINESS: 0
LOSS OF SENSATION IN FEET: 0

## 2024-11-08 ASSESSMENT — PAIN SCALES - GENERAL: PAINLEVEL_OUTOF10: 0-NO PAIN
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
## 2024-11-08 NOTE — PROGRESS NOTES
Patient identification verified with 2 identifiers.    Location: [facility] - [address] [place] [id number] [phone]     Referring Physician: Dr. Anirudh Rehman  Enrollment/ Re-enrollment date: 2025   INR Goal: 2.5-3.5  INR monitoring is per [facility] protocol.  Anticoagulation Medication: warfarin  Indication: Aortic Valve Replacement    Subjective   Bleeding signs/symptoms: No    Bruising: No   Major bleeding event: No  Thrombosis signs/symptoms: No  Thromboembolic event: No  Missed doses: No  Extra doses: No  Medication changes: No  Dietary changes: No  Change in health: No  Change in activity: No  Alcohol: No  Other concerns: No    Upcoming Procedures:  Does the Patient Have any upcoming procedures that require interruption in anticoagulation therapy? no  Does the patient require bridging? no      Anticoagulation Summary  As of 2024      INR goal:  2.5-3.5   TTR:  41.7% (1.1 y)   INR used for dosin.70 (2024)   Weekly warfarin total:  13 mg               Assessment/Plan   Subtherapeutic     1. New dose:  Increase TWD     2. Next INR: 1 week      Education provided to patient during the visit:  Patient instructed to call in interim with questions, concerns and changes.   Patient educated on interactions between medications and warfarin.   Patient educated on dietary consistency in vitamin k consumption.   Patient educated on affects of alcohol consumption while taking warfarin.   Patient educated on signs of bleeding/clotting.   Patient educated on compliance with dosing, follow up appointments, and prescribed plan of care.

## 2024-11-11 ENCOUNTER — APPOINTMENT (OUTPATIENT)
Dept: CARDIOLOGY | Facility: CLINIC | Age: 89
End: 2024-11-11
Payer: MEDICARE

## 2024-11-15 ENCOUNTER — APPOINTMENT (OUTPATIENT)
Dept: CARDIOLOGY | Facility: CLINIC | Age: 89
End: 2024-11-15
Payer: MEDICARE

## 2024-11-15 ENCOUNTER — LAB (OUTPATIENT)
Dept: LAB | Facility: LAB | Age: 89
End: 2024-11-15
Payer: MEDICARE

## 2024-11-15 DIAGNOSIS — I10 PRIMARY HYPERTENSION: ICD-10-CM

## 2024-11-15 DIAGNOSIS — R73.9 HYPERGLYCEMIA: ICD-10-CM

## 2024-11-15 DIAGNOSIS — Z01.89 ENCOUNTER FOR ROUTINE LABORATORY TESTING: ICD-10-CM

## 2024-11-15 DIAGNOSIS — E78.00 HYPERCHOLESTEROLEMIA: ICD-10-CM

## 2024-11-15 LAB
ALBUMIN SERPL BCP-MCNC: 3.8 G/DL (ref 3.4–5)
ALP SERPL-CCNC: 81 U/L (ref 33–136)
ALT SERPL W P-5'-P-CCNC: 19 U/L (ref 10–52)
ANION GAP SERPL CALCULATED.3IONS-SCNC: 8 MMOL/L (ref 10–20)
AST SERPL W P-5'-P-CCNC: 22 U/L (ref 9–39)
BASOPHILS # BLD AUTO: 0.04 X10*3/UL (ref 0–0.1)
BASOPHILS NFR BLD AUTO: 0.5 %
BILIRUB SERPL-MCNC: 0.3 MG/DL (ref 0–1.2)
BUN SERPL-MCNC: 28 MG/DL (ref 6–23)
CALCIUM SERPL-MCNC: 9 MG/DL (ref 8.6–10.3)
CHLORIDE SERPL-SCNC: 108 MMOL/L (ref 98–107)
CHOLEST SERPL-MCNC: 204 MG/DL (ref 0–199)
CHOLEST/HDLC SERPL: 3.2 {RATIO}
CO2 SERPL-SCNC: 29 MMOL/L (ref 21–32)
CREAT SERPL-MCNC: 1.22 MG/DL (ref 0.5–1.3)
EGFRCR SERPLBLD CKD-EPI 2021: 57 ML/MIN/1.73M*2
EOSINOPHIL # BLD AUTO: 0.34 X10*3/UL (ref 0–0.4)
EOSINOPHIL NFR BLD AUTO: 4.3 %
ERYTHROCYTE [DISTWIDTH] IN BLOOD BY AUTOMATED COUNT: 18.6 % (ref 11.5–14.5)
GLUCOSE SERPL-MCNC: 83 MG/DL (ref 74–99)
HCT VFR BLD AUTO: 37.6 % (ref 41–52)
HDLC SERPL-MCNC: 63.5 MG/DL
HGB BLD-MCNC: 11.2 G/DL (ref 13.5–17.5)
IMM GRANULOCYTES # BLD AUTO: 0.02 X10*3/UL (ref 0–0.5)
IMM GRANULOCYTES NFR BLD AUTO: 0.3 % (ref 0–0.9)
LDLC SERPL CALC-MCNC: 122 MG/DL
LYMPHOCYTES # BLD AUTO: 0.71 X10*3/UL (ref 0.8–3)
LYMPHOCYTES NFR BLD AUTO: 9.1 %
MCH RBC QN AUTO: 23.8 PG (ref 26–34)
MCHC RBC AUTO-ENTMCNC: 29.8 G/DL (ref 32–36)
MCV RBC AUTO: 80 FL (ref 80–100)
MONOCYTES # BLD AUTO: 0.91 X10*3/UL (ref 0.05–0.8)
MONOCYTES NFR BLD AUTO: 11.6 %
NEUTROPHILS # BLD AUTO: 5.82 X10*3/UL (ref 1.6–5.5)
NEUTROPHILS NFR BLD AUTO: 74.2 %
NON HDL CHOLESTEROL: 141 MG/DL (ref 0–149)
NRBC BLD-RTO: 0 /100 WBCS (ref 0–0)
PLATELET # BLD AUTO: 345 X10*3/UL (ref 150–450)
POTASSIUM SERPL-SCNC: 4.4 MMOL/L (ref 3.5–5.3)
PROT SERPL-MCNC: 7.3 G/DL (ref 6.4–8.2)
RBC # BLD AUTO: 4.7 X10*6/UL (ref 4.5–5.9)
SODIUM SERPL-SCNC: 141 MMOL/L (ref 136–145)
TRIGL SERPL-MCNC: 93 MG/DL (ref 0–149)
TSH SERPL-ACNC: 2.17 MIU/L (ref 0.44–3.98)
VLDL: 19 MG/DL (ref 0–40)
WBC # BLD AUTO: 7.8 X10*3/UL (ref 4.4–11.3)

## 2024-11-15 PROCEDURE — 83036 HEMOGLOBIN GLYCOSYLATED A1C: CPT

## 2024-11-15 PROCEDURE — 85025 COMPLETE CBC W/AUTO DIFF WBC: CPT

## 2024-11-15 PROCEDURE — 36415 COLL VENOUS BLD VENIPUNCTURE: CPT

## 2024-11-15 PROCEDURE — 84443 ASSAY THYROID STIM HORMONE: CPT

## 2024-11-15 PROCEDURE — 80053 COMPREHEN METABOLIC PANEL: CPT

## 2024-11-15 PROCEDURE — 80061 LIPID PANEL: CPT

## 2024-11-16 LAB
EST. AVERAGE GLUCOSE BLD GHB EST-MCNC: 111 MG/DL
HBA1C MFR BLD: 5.5 %

## 2024-11-18 ENCOUNTER — APPOINTMENT (OUTPATIENT)
Dept: CARDIOLOGY | Facility: CLINIC | Age: 89
End: 2024-11-18
Payer: MEDICARE

## 2024-11-20 ENCOUNTER — ANTICOAGULATION - WARFARIN VISIT (OUTPATIENT)
Dept: CARDIOLOGY | Facility: CLINIC | Age: 89
End: 2024-11-20
Payer: MEDICARE

## 2024-11-20 DIAGNOSIS — Z95.2 HISTORY OF AORTIC VALVE REPLACEMENT: Primary | ICD-10-CM

## 2024-11-20 DIAGNOSIS — I48.92 ATRIAL FIBRILLATION AND FLUTTER: ICD-10-CM

## 2024-11-20 DIAGNOSIS — I48.91 ATRIAL FIBRILLATION AND FLUTTER: ICD-10-CM

## 2024-11-20 DIAGNOSIS — I48.21 PERMANENT ATRIAL FIBRILLATION (MULTI): ICD-10-CM

## 2024-11-20 LAB
POC INR: 1.8
POC PROTHROMBIN TIME: NORMAL

## 2024-11-20 PROCEDURE — 85610 PROTHROMBIN TIME: CPT | Mod: QW

## 2024-11-20 PROCEDURE — 99211 OFF/OP EST MAY X REQ PHY/QHP: CPT

## 2024-11-20 RX ORDER — METOPROLOL SUCCINATE 50 MG/1
50 TABLET, EXTENDED RELEASE ORAL DAILY
Qty: 90 TABLET | Refills: 0 | Status: SHIPPED | OUTPATIENT
Start: 2024-11-20

## 2024-11-20 NOTE — PROGRESS NOTES
Patient identification verified with 2 identifiers.    Location: [facility] - [address] [place] [id number] [phone]     Referring Physician: Dr. Anirudh Rehman  Enrollment/ Re-enrollment date: 2025   INR Goal: 2.5-3.5  INR monitoring is per [facility] protocol.  Anticoagulation Medication: warfarin  Indication: Aortic Valve Replacement    Subjective   Bleeding signs/symptoms: No    Bruising: No   Major bleeding event: No  Thrombosis signs/symptoms: No  Thromboembolic event: No  Missed doses: No  Extra doses: No  Medication changes: No  Dietary changes: No  Change in health: No  Change in activity: No  Alcohol: No  Other concerns: No    Upcoming Procedures:  Does the Patient Have any upcoming procedures that require interruption in anticoagulation therapy? no  Does the patient require bridging? no      Anticoagulation Summary  As of 2024      INR goal:  2.5-3.5   TTR:  40.5% (1.1 y)   INR used for dosin.80 (2024)   Weekly warfarin total:  15 mg               Assessment/Plan   Subtherapeutic     1. New dose:  Increase TWD.      2. Next INR: 1 week      Education provided to patient during the visit:  Patient instructed to call in interim with questions, concerns and changes.   Patient educated on interactions between medications and warfarin.   Patient educated on dietary consistency in vitamin k consumption.   Patient educated on affects of alcohol consumption while taking warfarin.   Patient educated on signs of bleeding/clotting.   Patient educated on compliance with dosing, follow up appointments, and prescribed plan of care.

## 2024-11-27 ENCOUNTER — ANTICOAGULATION - WARFARIN VISIT (OUTPATIENT)
Dept: CARDIOLOGY | Facility: CLINIC | Age: 89
End: 2024-11-27
Payer: MEDICARE

## 2024-11-27 DIAGNOSIS — I48.21 PERMANENT ATRIAL FIBRILLATION (MULTI): ICD-10-CM

## 2024-11-27 DIAGNOSIS — Z95.2 HISTORY OF AORTIC VALVE REPLACEMENT: Primary | ICD-10-CM

## 2024-11-27 LAB
POC INR: 4.5
POC PROTHROMBIN TIME: NORMAL

## 2024-11-27 PROCEDURE — 99211 OFF/OP EST MAY X REQ PHY/QHP: CPT

## 2024-11-27 PROCEDURE — 85610 PROTHROMBIN TIME: CPT | Mod: QW

## 2024-11-27 NOTE — PROGRESS NOTES
Patient identification verified with 2 identifiers.    Location: [facility] - [address] [place] [id number] [phone]     Referring Physician: Dr. Anirudh Rehman  Enrollment/ Re-enrollment date: 2025   INR Goal: 2.5-3.5  INR monitoring is per [facility] protocol.  Anticoagulation Medication: warfarin  Indication: Aortic Valve Replacement    Subjective   Bleeding signs/symptoms: No    Bruising: No   Major bleeding event: No  Thrombosis signs/symptoms: No  Thromboembolic event: No  Missed doses: No  Extra doses: No  Medication changes: No  Dietary changes: No  Change in health: No  Change in activity: No  Alcohol: No  Other concerns: No    Upcoming Procedures:  Does the Patient Have any upcoming procedures that require interruption in anticoagulation therapy? no  Does the patient require bridging? no      Anticoagulation Summary  As of 2024      INR goal:  2.5-3.5   TTR:  40.4% (1.1 y)   INR used for dosin.50 (2024)   Weekly warfarin total:  14 mg               Assessment/Plan   Supratherapeutic     1. New dose:  Hold Warfarin .      2. Next INR: 1 week      Education provided to patient during the visit:  Patient instructed to call in interim with questions, concerns and changes.   Patient educated on interactions between medications and warfarin.   Patient educated on dietary consistency in vitamin k consumption.   Patient educated on affects of alcohol consumption while taking warfarin.   Patient educated on signs of bleeding/clotting.   Patient educated on compliance with dosing, follow up appointments, and prescribed plan of care.

## 2024-12-04 ENCOUNTER — ANTICOAGULATION - WARFARIN VISIT (OUTPATIENT)
Dept: CARDIOLOGY | Facility: CLINIC | Age: 89
End: 2024-12-04
Payer: MEDICARE

## 2024-12-04 DIAGNOSIS — I48.21 PERMANENT ATRIAL FIBRILLATION (MULTI): ICD-10-CM

## 2024-12-04 DIAGNOSIS — Z95.2 HISTORY OF AORTIC VALVE REPLACEMENT: Primary | ICD-10-CM

## 2024-12-04 LAB
POC INR: 3.7
POC PROTHROMBIN TIME: NORMAL

## 2024-12-04 PROCEDURE — 99211 OFF/OP EST MAY X REQ PHY/QHP: CPT

## 2024-12-04 PROCEDURE — 85610 PROTHROMBIN TIME: CPT | Mod: QW

## 2024-12-04 NOTE — PROGRESS NOTES
Patient identification verified with 2 identifiers.    Location: [facility] - [address] [place], [place] [address] [phone]     Referring Physician: Dr. Anirudh Rehman  Enrollment/ Re-enrollment date: 8/1/2025   INR Goal: 2.5-3.5  INR monitoring is per [facility] protocol.  Anticoagulation Medication: warfarin  Indication: Aortic Valve Replacement    Subjective   Bleeding signs/symptoms: No    Bruising: No   Major bleeding event: No  Thrombosis signs/symptoms: No  Thromboembolic event: No  Missed doses: No  Extra doses: Yes  Medication changes: No  Dietary changes: No  Change in health: No  Change in activity: No  Alcohol: No  Other concerns: No    Upcoming Procedures:  Does the Patient Have any upcoming procedures that require interruption in anticoagulation therapy? no  Does the patient require bridging? no      Anticoagulation Summary  As of 12/4/2024      INR goal:  2.5-3.5   TTR:  39.7% (1.1 y)   INR used for dosing:  3.70 (12/4/2024)   Weekly warfarin total:  14 mg               Assessment/Plan   Supratherapeutic     1. New dose: no change    2. Next INR:  5 days      Education provided to patient during the visit:  Patient instructed to call in interim with questions, concerns and changes.   Patient educated on signs of bleeding/clotting.   Patient educated on compliance with dosing, follow up appointments, and prescribed plan of care.

## 2024-12-09 ENCOUNTER — ANTICOAGULATION - WARFARIN VISIT (OUTPATIENT)
Dept: CARDIOLOGY | Facility: CLINIC | Age: 89
End: 2024-12-09
Payer: MEDICARE

## 2024-12-09 DIAGNOSIS — I48.21 PERMANENT ATRIAL FIBRILLATION (MULTI): ICD-10-CM

## 2024-12-09 DIAGNOSIS — Z95.2 HISTORY OF AORTIC VALVE REPLACEMENT: Primary | ICD-10-CM

## 2024-12-09 LAB
POC INR: 2.4
POC PROTHROMBIN TIME: NORMAL

## 2024-12-09 PROCEDURE — 85610 PROTHROMBIN TIME: CPT | Mod: QW

## 2024-12-09 PROCEDURE — 99211 OFF/OP EST MAY X REQ PHY/QHP: CPT

## 2024-12-09 NOTE — PROGRESS NOTES
Patient identification verified with 2 identifiers.    Location: [facility] - [address] [place], [place] [address] [phone]     Referring Physician: Dr. Aniurdh Rehman  Enrollment/ Re-enrollment date: 2025   INR Goal: 2.5-3.5  INR monitoring is per [facility] protocol.  Anticoagulation Medication: warfarin  Indication: Aortic Valve Replacement    Subjective   Bleeding signs/symptoms: No    Bruising: No   Major bleeding event: No  Thrombosis signs/symptoms: No  Thromboembolic event: No  Missed doses: No  Extra doses: No  Medication changes: No  Dietary changes: No  Change in health: No  Change in activity: No  Alcohol: No  Other concerns: No    Upcoming Procedures:  Does the Patient Have any upcoming procedures that require interruption in anticoagulation therapy? no  Does the patient require bridging? no      Anticoagulation Summary  As of 2024      INR goal:  2.5-3.5   TTR:  40.2% (1.1 y)   INR used for dosin.40 (2024)   Weekly warfarin total:  14 mg               Assessment/Plan   Subtherapeutic     1. New dose: no change    2. Next INR: 1 week      Education provided to patient during the visit:  Patient instructed to call in interim with questions, concerns and changes.   Patient educated on interactions between medications and warfarin.   Patient educated on dietary consistency in vitamin k consumption.   Patient educated on affects of alcohol consumption while taking warfarin.   Patient educated on signs of bleeding/clotting.   Patient educated on compliance with dosing, follow up appointments, and prescribed plan of care.

## 2024-12-13 ENCOUNTER — OFFICE VISIT (OUTPATIENT)
Dept: PRIMARY CARE | Facility: CLINIC | Age: 89
End: 2024-12-13
Payer: MEDICARE

## 2024-12-13 VITALS
TEMPERATURE: 97 F | SYSTOLIC BLOOD PRESSURE: 118 MMHG | HEIGHT: 68 IN | WEIGHT: 155 LBS | DIASTOLIC BLOOD PRESSURE: 84 MMHG | HEART RATE: 58 BPM | OXYGEN SATURATION: 95 % | BODY MASS INDEX: 23.49 KG/M2

## 2024-12-13 DIAGNOSIS — I35.0 AORTIC VALVE STENOSIS, ETIOLOGY OF CARDIAC VALVE DISEASE UNSPECIFIED: ICD-10-CM

## 2024-12-13 DIAGNOSIS — D64.9 ANEMIA, UNSPECIFIED TYPE: ICD-10-CM

## 2024-12-13 DIAGNOSIS — I50.20 SYSTOLIC CONGESTIVE HEART FAILURE, UNSPECIFIED HF CHRONICITY: ICD-10-CM

## 2024-12-13 DIAGNOSIS — R73.03 PREDIABETES: Primary | ICD-10-CM

## 2024-12-13 DIAGNOSIS — N18.31 STAGE 3A CHRONIC KIDNEY DISEASE (MULTI): ICD-10-CM

## 2024-12-13 DIAGNOSIS — Z23 ENCOUNTER FOR IMMUNIZATION: ICD-10-CM

## 2024-12-13 DIAGNOSIS — I10 PRIMARY HYPERTENSION: ICD-10-CM

## 2024-12-13 DIAGNOSIS — I48.91 ATRIAL FIBRILLATION, UNSPECIFIED TYPE (MULTI): ICD-10-CM

## 2024-12-13 DIAGNOSIS — E78.00 HYPERCHOLESTEROLEMIA: ICD-10-CM

## 2024-12-13 DIAGNOSIS — M15.0 PRIMARY OSTEOARTHRITIS INVOLVING MULTIPLE JOINTS: ICD-10-CM

## 2024-12-13 DIAGNOSIS — I48.20 CHRONIC ATRIAL FIBRILLATION (MULTI): ICD-10-CM

## 2024-12-13 DIAGNOSIS — E53.8 VITAMIN B12 DEFICIENCY: ICD-10-CM

## 2024-12-13 DIAGNOSIS — Z00.00 ANNUAL PHYSICAL EXAM: ICD-10-CM

## 2024-12-13 PROCEDURE — 1158F ADVNC CARE PLAN TLK DOCD: CPT | Performed by: INTERNAL MEDICINE

## 2024-12-13 PROCEDURE — 3079F DIAST BP 80-89 MM HG: CPT | Performed by: INTERNAL MEDICINE

## 2024-12-13 PROCEDURE — 1159F MED LIST DOCD IN RCRD: CPT | Performed by: INTERNAL MEDICINE

## 2024-12-13 PROCEDURE — 1160F RVW MEDS BY RX/DR IN RCRD: CPT | Performed by: INTERNAL MEDICINE

## 2024-12-13 PROCEDURE — 3074F SYST BP LT 130 MM HG: CPT | Performed by: INTERNAL MEDICINE

## 2024-12-13 PROCEDURE — G0439 PPPS, SUBSEQ VISIT: HCPCS | Performed by: INTERNAL MEDICINE

## 2024-12-13 PROCEDURE — 1157F ADVNC CARE PLAN IN RCRD: CPT | Performed by: INTERNAL MEDICINE

## 2024-12-13 PROCEDURE — G0008 ADMIN INFLUENZA VIRUS VAC: HCPCS | Performed by: INTERNAL MEDICINE

## 2024-12-13 PROCEDURE — 99213 OFFICE O/P EST LOW 20 MIN: CPT | Mod: 25 | Performed by: INTERNAL MEDICINE

## 2024-12-13 PROCEDURE — 99215 OFFICE O/P EST HI 40 MIN: CPT | Mod: 25 | Performed by: INTERNAL MEDICINE

## 2024-12-13 PROCEDURE — 99213 OFFICE O/P EST LOW 20 MIN: CPT | Performed by: INTERNAL MEDICINE

## 2024-12-13 PROCEDURE — 1125F AMNT PAIN NOTED PAIN PRSNT: CPT | Performed by: INTERNAL MEDICINE

## 2024-12-13 PROCEDURE — 1123F ACP DISCUSS/DSCN MKR DOCD: CPT | Performed by: INTERNAL MEDICINE

## 2024-12-13 RX ORDER — WARFARIN 1 MG/1
1 TABLET ORAL SEE ADMIN INSTRUCTIONS
Qty: 90 TABLET | Refills: 3 | Status: SHIPPED | OUTPATIENT
Start: 2024-12-13

## 2024-12-13 ASSESSMENT — ENCOUNTER SYMPTOMS
RESPIRATORY NEGATIVE: 1
DEPRESSION: 0
ALLERGIC/IMMUNOLOGIC NEGATIVE: 1
NEUROLOGICAL NEGATIVE: 1
ENDOCRINE NEGATIVE: 1
MUSCULOSKELETAL NEGATIVE: 1
HEMATOLOGIC/LYMPHATIC NEGATIVE: 1
CONSTITUTIONAL NEGATIVE: 1
PSYCHIATRIC NEGATIVE: 1
LOSS OF SENSATION IN FEET: 0
CARDIOVASCULAR NEGATIVE: 1
GASTROINTESTINAL NEGATIVE: 1
EYES NEGATIVE: 1
OCCASIONAL FEELINGS OF UNSTEADINESS: 0

## 2024-12-13 ASSESSMENT — PAIN SCALES - GENERAL: PAINLEVEL_OUTOF10: 2

## 2024-12-13 NOTE — PATIENT INSTRUCTIONS
We were able to do your wellness visit and follow-up for your chronic medical problems today.  Here a few important assessments  1.  Advanced directives-I am glad to see that you do have your advance directive in the chart  2.  Cancer screenings-nothing pertinent at your age  3.  Immunizations-you got your flu shot today.  You need to go to get your RSV vaccine at the local pharmacy because you over age 75.  On the good side you had pneumonia vaccine in 2022 and shingles vaccines in 2019-you will need those in the future.  Your last tetanus shot was given in 2018 and that will be due in 2028.  4.  Anemia-I noticed your mild anemia I ordered additional blood test today to sort that out.  5.  Chronic kidney disease-3A-we discussed the implications of this and this is mainly a calculation based on your age.  You should avoid over-the-counter anti-inflammatory medicines which I know you already avoid because of your blood thinners.  6.  Heart disease-you are doing very well in that regard.    I will look forward to seeing you in 6 months unless you need me sooner.  If you get the RSV vaccine anywhere in the [facility] will automatically show up in my chart

## 2024-12-13 NOTE — PROGRESS NOTES
[facility]: [facility]  MEDICARE WELLNESS EXAM      Jose Martin Fleming is a 89 y.o. male that is presenting today for Medicare Annual Wellness Visit Subsequent.    Assessment/Plan    Diagnoses and all orders for this visit:  Prediabetes  Annual physical exam  -     Follow Up In Primary Care  Encounter for immunization  -     Flu vaccine, trivalent, preservative free, HIGH-DOSE, age 65y+ (Fluzone)  Chronic atrial fibrillation (Multi)  Primary osteoarthritis involving multiple joints  Hypercholesterolemia  -     Lipid Panel; Future  -     TSH with reflex to Free T4 if abnormal; Future  Primary hypertension  -     Comprehensive Metabolic Panel; Future  Aortic valve stenosis, etiology of cardiac valve disease unspecified  Systolic congestive heart failure, unspecified HF chronicity  Atrial fibrillation, unspecified type (Multi)  -     warfarin (Coumadin) 1 mg tablet; Take 1 tablet (1 mg) by mouth see administration instructions. Please check INR on 9/23 in the coumadin clinic.  Anemia, unspecified type  -     CBC and Auto Differential; Future  -     Ferritin; Future  -     Iron and TIBC; Future  -     CBC and Auto Differential; Future  Vitamin B12 deficiency  -     Vitamin B12; Future  -     Folate; Future  Stage 3a chronic kidney disease (Multi)  Other orders  -     Follow Up In Primary Care - Established; Future  We completed the medicare wellness exam today.  The lifestyle is reviewed and recommendations for diet and exercise are made. We reviewed the immunizations and cancer screening and recommendations for needed immunizations and screenings are made.  The patient is independent in all ADL, shows no clinical signs of cognitive impairment, and is not falling or at risk for such.     Also sees Dr. Rehman;    In terms of annual wellness visit elements:  1.  He does have an advanced directives which is already in the chart  2.  Cancer screenings-not pertinent at age 89  3.  Immunizations-he is  doing pretty well in this space he needs an RSV vaccine but other and he needs his flu vaccine today.  Otherwise she has had Pneumovax in 2022 Tdap in 2018 and Shingrix in 2019.    Terms of his chronic medical problems he is doing well.  We reviewed his recent blood work done on November 14 as well as his clinical situation  1.  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  Systolic CHF-he is not involved with taking his diuretics but has been fully compliant with his medications.  He will continue such  3.  CKD 3A-we explained the implications of this with him.  4.  Anemia-we noticed in the chart that he is very mildly anemic of uncertain cause I will just repeat this in some iron B12 folate to see if there is anything essential showing.    I will plan on seeing him back in 6 months    ADVANCED CARE PLANNING  Advanced Care Planning was discussed with patient:  The patient has an active advanced care plan on file. The patient has an active surrogate decision-maker on file.  Encouraged the patient to confirm that Living Will and Healthcare Power of  (HCPoA) are accurate and up to date.  Encouraged the patient to confirm that our office be provided a copy of any documentation in the event that anything changes.    ACTIVITIES OF DAILY LIVING  Basic ADLs:  Bathing: Independent, Dressing: Independent, Toileting: Independent, Transferring: Independent, Continence: Independent, Feeding: Independent.    Instrumental ADLs:  Ability to use phone: Independent, Shopping: Independent, Cooking: Independent, House-keeping: Independent, Laundry: Independent, Transportation: Independent, Medication Management: Independent, Finance Management: Independent.    Subjective   Here for follow up and AWV - pt feeling pretty well this year.  Overall this has been a pretty healthy year compared to 2023.  He really is not bringing forth any new problems right now.   He has a pretty good appetite and has been keeping regular follow-up with cardiology.      Review of Systems   Constitutional: Negative.    HENT: Negative.     Eyes: Negative.    Respiratory: Negative.     Cardiovascular: Negative.    Gastrointestinal: Negative.    Endocrine: Negative.    Genitourinary: Negative.    Musculoskeletal: Negative.    Skin: Negative.    Allergic/Immunologic: Negative.    Neurological: Negative.    Hematological: Negative.    Psychiatric/Behavioral: Negative.     All other systems reviewed and are negative.    Objective   Vitals:    12/13/24 0728   BP: 118/84   Pulse: 58   Temp: 36.1 °C (97 °F)   SpO2: 95%      Body mass index is 23.57 kg/m².  Physical Exam  Vitals and nursing note reviewed.   Constitutional:       General: He is not in acute distress.     Appearance: Normal appearance. He is not ill-appearing.   HENT:      Head: Normocephalic and atraumatic.      Right Ear: Hearing, tympanic membrane, ear canal and external ear normal. There is no impacted cerumen.      Left Ear: Hearing, tympanic membrane, ear canal and external ear normal. There is no impacted cerumen.      Nose: Nose normal.      Mouth/Throat:      Mouth: Mucous membranes are moist.      Pharynx: Oropharynx is clear. No oropharyngeal exudate or posterior oropharyngeal erythema.   Eyes:      General: No scleral icterus.        Right eye: No discharge.         Left eye: No discharge.      Extraocular Movements: Extraocular movements intact.      Conjunctiva/sclera: Conjunctivae normal.      Pupils: Pupils are equal, round, and reactive to light.   Neck:      Vascular: No carotid bruit.   Cardiovascular:      Rate and Rhythm: Normal rate and regular rhythm.      Pulses: Normal pulses.      Heart sounds: Normal heart sounds. No murmur heard.     No friction rub. No gallop.   Pulmonary:      Effort: Pulmonary effort is normal. No respiratory distress.      Breath sounds: Normal breath sounds.   Abdominal:      General:  "Abdomen is flat. Bowel sounds are normal.      Palpations: Abdomen is soft.      Tenderness: There is no abdominal tenderness.      Hernia: No hernia is present.   Musculoskeletal:         General: No swelling. Normal range of motion.      Cervical back: Normal range of motion.   Lymphadenopathy:      Cervical: No cervical adenopathy.   Skin:     General: Skin is warm and dry.      Coloration: Skin is not jaundiced.      Findings: No rash.   Neurological:      General: No focal deficit present.      Mental Status: He is alert and oriented to person, place, and time. Mental status is at baseline.   Psychiatric:         Mood and Affect: Mood normal.         Behavior: Behavior normal.       Diagnostic Results   Lab Results   Component Value Date    GLUCOSE 83 11/15/2024    CALCIUM 9.0 11/15/2024     11/15/2024    K 4.4 11/15/2024    CO2 29 11/15/2024     (H) 11/15/2024    BUN 28 (H) 11/15/2024    CREATININE 1.22 11/15/2024     Lab Results   Component Value Date    ALT 19 11/15/2024    AST 22 11/15/2024    ALKPHOS 81 11/15/2024    BILITOT 0.3 11/15/2024     Lab Results   Component Value Date    WBC 7.8 11/15/2024    HGB 11.2 (L) 11/15/2024    HCT 37.6 (L) 11/15/2024    MCV 80 11/15/2024     11/15/2024     Lab Results   Component Value Date    CHOL 204 (H) 11/15/2024    CHOL 188 11/20/2023    CHOL 188 12/01/2022     Lab Results   Component Value Date    HDL 63.5 11/15/2024    HDL 52.0 11/20/2023    HDL 44 12/01/2022     Lab Results   Component Value Date    LDLCALC 122 (H) 11/15/2024    LDLCALC 114 11/20/2023    LDLCALC 123 12/01/2022     Lab Results   Component Value Date    TRIG 93 11/15/2024    TRIG 110 11/20/2023    TRIG 103 12/01/2022     No components found for: \"CHOLHDL\"  Lab Results   Component Value Date    HGBA1C 5.5 11/15/2024     Other labs not included in the list above reviewed either before or during this encounter.    History   Past Medical History:   Diagnosis Date    Anemia 09/02/2023 "    Aortic stenosis 09/02/2023    Aortic valve disorder 09/02/2023    At risk for bleeding 09/02/2023    Atrial fibrillation (Multi) 09/02/2023    Cerebrovascular accident (CVA) (Multi) 09/02/2023    History of aortic valve replacement 09/02/2023    Hypercholesterolemia 09/02/2023    Hypertension 09/02/2023    Long term (current) use of anticoagulants 09/25/2023    Personal history of other diseases of the circulatory system 07/18/2014    History of aortic valve disorder    Personal history of other diseases of the nervous system and sense organs     History of cataract    Prediabetes 09/02/2023    Preglaucoma, unspecified, unspecified eye     Glaucoma suspect     Past Surgical History:   Procedure Laterality Date    ANKLE FRACTURE SURGERY  1989    APPENDECTOMY  10/08/2013    Appendectomy    COLONOSCOPY  2010    IR EMBOLIZATION  09/09/2021    IR EMBOLIZATION [facility]    MECHANICAL AORTIC VALVE REPLACEMENT  1985    [facility]    TOTAL HIP ARTHROPLASTY Left 1999    TOTAL HIP ARTHROPLASTY Right 2005     Family History   Problem Relation Name Age of Onset    Heart attack Father      Sudden death Father          Cardiac    Diabetes Brother       Social History     Socioeconomic History    Marital status:      Spouse name: Not on file    Number of children: Not on file    Years of education: Not on file    Highest education level: Not on file   Occupational History    Not on file   Tobacco Use    Smoking status: Former     Current packs/day: 1.00     Average packs/day: 1 pack/day for 53.0 years (53.0 ttl pk-yrs)     Types: Cigarettes    Smokeless tobacco: Never   Vaping Use    Vaping status: Never Used   Substance and Sexual Activity    Alcohol use: Not Currently    Drug use: Never    Sexual activity: Not on file   Other Topics Concern    Not on file   Social History Narrative    Not on file     Social Drivers of Health     Financial Resource Strain: Low Risk  (9/18/2024)    Overall Financial Resource  Strain (CARDIA)     Difficulty of Paying Living Expenses: Not hard at all   Food Insecurity: No Food Insecurity (12/27/2023)    Hunger Vital Sign     Worried About Running Out of Food in the Last Year: Never true     Ran Out of Food in the Last Year: Never true   Transportation Needs: No Transportation Needs (9/18/2024)    PRAPARE - Transportation     Lack of Transportation (Medical): No     Lack of Transportation (Non-Medical): No   Physical Activity: Sufficiently Active (12/27/2023)    Exercise Vital Sign     Days of Exercise per Week: 7 days     Minutes of Exercise per Session: 30 min   Stress: No Stress Concern Present (12/27/2023)    [employer] - Occupational Stress Questionnaire     Feeling of Stress : Not at all   Social Connections: Moderately Isolated (12/27/2023)    Social Connection and Isolation Panel [NHANES]     Frequency of Communication with Friends and Family: More than three times a week     Frequency of Social Gatherings with Friends and Family: More than three times a week     Attends [employer] Services: Never     Active Member of Clubs or Organizations: No     Attends Club or Organization Meetings: Never     Marital Status:    Intimate Partner Violence: Not At Risk (12/27/2023)    Humiliation, Afraid, Rape, and Kick questionnaire     Fear of Current or Ex-Partner: No     Emotionally Abused: No     Physically Abused: No     Sexually Abused: No   Housing Stability: Low Risk  (9/18/2024)    Housing Stability Vital Sign     Unable to Pay for Housing in the Last Year: No     Number of Times Moved in the Last Year: 0     Homeless in the Last Year: No     Allergies   Allergen Reactions    Rosuvastatin Other     Muscle aches     Current Outpatient Medications on File Prior to Visit   Medication Sig Dispense Refill    amiodarone (Pacerone) 200 mg tablet Take 1 tablet (200 mg) by mouth once daily. 90 tablet 1    aspirin (ASPIR-81 ORAL) Take 1 tablet by mouth once  daily.      b complex 0.4 mg tablet Take 1 tablet by mouth once daily.      clotrimazole-betamethasone (Lotrisone) cream Apply 1 Application topically 2 times a day.      furosemide (Lasix) 40 mg tablet Take 1 tablet (40 mg) by mouth once daily. 90 tablet 3    metoprolol succinate XL (Toprol XL) 50 mg 24 hr tablet Take 1 tablet (50 mg) by mouth once daily. Do not crush or chew. 90 tablet 0    ZINC ACETATE ORAL Take 1 tablet by mouth once daily.      [DISCONTINUED] warfarin (Coumadin) 1 mg tablet Take 1 tablet (1 mg) by mouth see administration instructions. Please check INR on 9/23 in the coumadin clinic. 30 tablet 0    sacubitriL-valsartan (Entresto) 24-26 mg tablet Take 1 tablet by mouth 2 times a day. 60 tablet 2     No current facility-administered medications on file prior to visit.     Immunization History   Administered Date(s) Administered    COVID-19, mRNA, LNP-S, PF, 30 mcg/0.3 mL dose 10/25/2021    COVID-19, subunit, rS-nanoparticle, adjuvanted, PF, 5 mcg/0.5 mL 11/22/2024    Flu vaccine, quadrivalent, high-dose, preservative free, age 65y+ (FLUZONE) 09/24/2020, 12/01/2021, 12/02/2022, 12/08/2023    Flu vaccine, trivalent, preservative free, HIGH-DOSE, age 65y+ (Fluzone) 09/10/2013, 11/11/2016, 10/04/2017, 11/19/2018, 11/20/2019    Influenza, Unspecified 12/02/2022    Influenza, seasonal, injectable 10/15/2014, 10/08/2015, 09/19/2018, 12/13/2024    Moderna SARS-CoV-2 Booster 09/20/2023    Moderna SARS-CoV-2 Vaccination 01/20/2021, 02/17/2021    Pfizer COVID-19 vaccine, 12 years and older, (30mcg/0.3mL) (Comirnaty) 11/19/2023, 07/19/2024    Pfizer COVID-19 vaccine, bivalent, age 12 years and older (30 mcg/0.3 mL) 09/09/2022    Pfizer Gray Cap SARS-CoV-2 04/06/2022    Pneumococcal conjugate vaccine, 13-valent (PREVNAR 13) 11/27/2015    Pneumococcal conjugate vaccine, 20-valent (PREVNAR 20) 12/02/2022    Pneumococcal polysaccharide vaccine, 23-valent, age 2 years and older (PNEUMOVAX 23) 06/18/2009,  10/15/2014    Td vaccine, age 7 years and older (TDVAX) 06/16/2008    Tdap vaccine, age 7 year and older (BOOSTRIX, ADACEL) 11/19/2018    Zoster vaccine, recombinant, adult (SHINGRIX) 06/05/2019, 08/14/2019    Zoster, live 06/24/2009     Patient's medical history was reviewed and updated either before or during this encounter.     Ryan Gonzalez MD

## 2024-12-16 ENCOUNTER — ANTICOAGULATION - WARFARIN VISIT (OUTPATIENT)
Dept: CARDIOLOGY | Facility: CLINIC | Age: 89
End: 2024-12-16
Payer: MEDICARE

## 2024-12-16 DIAGNOSIS — I48.21 PERMANENT ATRIAL FIBRILLATION (MULTI): ICD-10-CM

## 2024-12-16 DIAGNOSIS — Z95.2 HISTORY OF AORTIC VALVE REPLACEMENT: Primary | ICD-10-CM

## 2024-12-16 LAB
POC INR: 1.5
POC PROTHROMBIN TIME: NORMAL

## 2024-12-16 PROCEDURE — 85610 PROTHROMBIN TIME: CPT | Mod: QW

## 2024-12-16 PROCEDURE — 99211 OFF/OP EST MAY X REQ PHY/QHP: CPT

## 2024-12-16 NOTE — PROGRESS NOTES
Patient identification verified with 2 identifiers.    Location: [facility] - [address] [place], [place] [address] [phone]     Referring Physician: Dr. Anirudh Rehman  Enrollment/ Re-enrollment date: 2025   INR Goal: 2.5-3.5  INR monitoring is per [facility] protocol.  Anticoagulation Medication: warfarin  Indication: Aortic Valve Replacement    Subjective   Bleeding signs/symptoms: No    Bruising: No   Major bleeding event: No  Thrombosis signs/symptoms: No  Thromboembolic event: No  Missed doses: No  Extra doses: No  Medication changes: No  Dietary changes: No  Change in health: No  Change in activity: No  Alcohol: No  Other concerns: No    Upcoming Procedures:  Does the Patient Have any upcoming procedures that require interruption in anticoagulation therapy? no  Does the patient require bridging? no      Anticoagulation Summary  As of 2024      INR goal:  2.5-3.5   TTR:  39.5% (1.2 y)   INR used for dosin.50 (2024)   Weekly warfarin total:  14 mg               Assessment/Plan   Subtherapeutic     1. New dose: no change    2. Next INR: 1 week      Education provided to patient during the visit:  Patient instructed to call in interim with questions, concerns and changes.   Patient educated on interactions between medications and warfarin.   Patient educated on dietary consistency in vitamin k consumption.   Patient educated on affects of alcohol consumption while taking warfarin.   Patient educated on signs of bleeding/clotting.   Patient educated on compliance with dosing, follow up appointments, and prescribed plan of care.

## 2024-12-19 ENCOUNTER — PATIENT OUTREACH (OUTPATIENT)
Dept: PRIMARY CARE | Facility: CLINIC | Age: 89
End: 2024-12-19
Payer: MEDICARE

## 2024-12-23 ENCOUNTER — ANTICOAGULATION - WARFARIN VISIT (OUTPATIENT)
Dept: CARDIOLOGY | Facility: CLINIC | Age: 89
End: 2024-12-23
Payer: MEDICARE

## 2024-12-23 DIAGNOSIS — I48.21 PERMANENT ATRIAL FIBRILLATION (MULTI): ICD-10-CM

## 2024-12-23 DIAGNOSIS — Z95.2 HISTORY OF AORTIC VALVE REPLACEMENT: Primary | ICD-10-CM

## 2024-12-23 LAB
POC INR: 1.5
POC PROTHROMBIN TIME: NORMAL

## 2024-12-23 PROCEDURE — 99211 OFF/OP EST MAY X REQ PHY/QHP: CPT

## 2024-12-23 PROCEDURE — 85610 PROTHROMBIN TIME: CPT | Mod: QW

## 2024-12-30 ENCOUNTER — ANTICOAGULATION - WARFARIN VISIT (OUTPATIENT)
Dept: CARDIOLOGY | Facility: CLINIC | Age: 89
End: 2024-12-30
Payer: MEDICARE

## 2024-12-30 DIAGNOSIS — I48.21 PERMANENT ATRIAL FIBRILLATION (MULTI): ICD-10-CM

## 2024-12-30 DIAGNOSIS — Z95.2 HISTORY OF AORTIC VALVE REPLACEMENT: Primary | ICD-10-CM

## 2024-12-30 LAB
POC INR: 1.9
POC PROTHROMBIN TIME: NORMAL

## 2024-12-30 PROCEDURE — 99211 OFF/OP EST MAY X REQ PHY/QHP: CPT

## 2024-12-30 PROCEDURE — 85610 PROTHROMBIN TIME: CPT | Mod: QW

## 2024-12-30 NOTE — PROGRESS NOTES
Patient identification verified with 2 identifiers.    Location: [facility] - [address] [place] [id number] [phone]     Referring Physician: Dr. Anirudh Rehman  Enrollment/ Re-enrollment date: 8/1/2025   INR Goal: 2.5-3.5  INR monitoring is per [facility] protocol.  Anticoagulation Medication: warfarin  Indication: Aortic Valve Replacement    Subjective   Bleeding signs/symptoms: No    Bruising: No   Major bleeding event: No  Thrombosis signs/symptoms: No  Thromboembolic event: No  Missed doses: No  Extra doses: No  Medication changes: No  Dietary changes: No  Change in health: No  Change in activity: No  Alcohol: No  Other concerns: No    Upcoming Procedures:  Does the Patient Have any upcoming procedures that require interruption in anticoagulation therapy? no  Does the patient require bridging? no      Anticoagulation Summary  As of 12/30/2024      INR goal:  2.5-3.5   TTR:  38.9% (1.2 y)   INR used for dosing:  --               Assessment/Plan   Subtherapeutic     1. New dose:  Increase TWD     2. Next INR: 1 week      Education provided to patient during the visit:  Patient instructed to call in interim with questions, concerns and changes.   Patient educated on interactions between medications and warfarin.   Patient educated on dietary consistency in vitamin k consumption.   Patient educated on affects of alcohol consumption while taking warfarin.   Patient educated on signs of bleeding/clotting.   Patient educated on compliance with dosing, follow up appointments, and prescribed plan of care.

## 2025-01-06 ENCOUNTER — ANTICOAGULATION - WARFARIN VISIT (OUTPATIENT)
Dept: CARDIOLOGY | Facility: CLINIC | Age: OVER 89
End: 2025-01-06
Payer: MEDICARE

## 2025-01-06 DIAGNOSIS — Z95.2 HISTORY OF AORTIC VALVE REPLACEMENT: Primary | ICD-10-CM

## 2025-01-06 DIAGNOSIS — I48.21 PERMANENT ATRIAL FIBRILLATION (MULTI): ICD-10-CM

## 2025-01-06 LAB
POC INR: 1.5
POC PROTHROMBIN TIME: NORMAL

## 2025-01-06 PROCEDURE — 85610 PROTHROMBIN TIME: CPT | Mod: QW

## 2025-01-06 PROCEDURE — 99211 OFF/OP EST MAY X REQ PHY/QHP: CPT

## 2025-01-06 NOTE — PROGRESS NOTES
Patient identification verified with 2 identifiers.    Location: [facility] - [address] [place] [id number] [phone]     Referring Physician: Dr. Anirudh Rehman  Enrollment/ Re-enrollment date: 8/1/2025   INR Goal: 2.5-3.5  INR monitoring is per [facility] protocol.  Anticoagulation Medication: warfarin  Indication: Aortic Valve Replacement    Subjective   Bleeding signs/symptoms: No    Bruising: No   Major bleeding event: No  Thrombosis signs/symptoms: No  Thromboembolic event: No  Missed doses: No  Extra doses: No  Medication changes: No  Dietary changes: No  Change in health: No  Change in activity: No  Alcohol: No  Other concerns: No    Upcoming Procedures:  Does the Patient Have any upcoming procedures that require interruption in anticoagulation therapy? no  Does the patient require bridging? no      Anticoagulation Summary  As of 1/6/2025      INR goal:  2.5-3.5   TTR:  38.3% (1.2 y)   INR used for dosing:  --               Assessment/Plan   Subtherapeutic     1. New dose:  Extra half tablet today, increase TWD.     2. Next INR: 1 week      Education provided to patient during the visit:  Patient instructed to call in interim with questions, concerns and changes.   Patient educated on interactions between medications and warfarin.   Patient educated on dietary consistency in vitamin k consumption.   Patient educated on affects of alcohol consumption while taking warfarin.   Patient educated on signs of bleeding/clotting.   Patient educated on compliance with dosing, follow up appointments, and prescribed plan of care.

## 2025-01-08 ENCOUNTER — TELEPHONE (OUTPATIENT)
Dept: CARDIOLOGY | Facility: CLINIC | Age: OVER 89
End: 2025-01-08
Payer: MEDICARE

## 2025-01-08 DIAGNOSIS — I48.91 ATRIAL FIBRILLATION AND FLUTTER: ICD-10-CM

## 2025-01-08 DIAGNOSIS — I48.92 ATRIAL FIBRILLATION AND FLUTTER: ICD-10-CM

## 2025-01-08 RX ORDER — AMIODARONE HYDROCHLORIDE 200 MG/1
200 TABLET ORAL DAILY
Qty: 90 TABLET | Refills: 1 | Status: SHIPPED | OUTPATIENT
Start: 2025-01-08 | End: 2026-01-08

## 2025-01-13 ENCOUNTER — ANTICOAGULATION - WARFARIN VISIT (OUTPATIENT)
Dept: CARDIOLOGY | Facility: CLINIC | Age: OVER 89
End: 2025-01-13
Payer: MEDICARE

## 2025-01-13 DIAGNOSIS — Z95.2 HISTORY OF AORTIC VALVE REPLACEMENT: Primary | ICD-10-CM

## 2025-01-13 DIAGNOSIS — R94.31 ABNORMAL EKG: Primary | ICD-10-CM

## 2025-01-13 DIAGNOSIS — I48.92 ATRIAL FIBRILLATION AND FLUTTER: ICD-10-CM

## 2025-01-13 DIAGNOSIS — I48.21 PERMANENT ATRIAL FIBRILLATION (MULTI): ICD-10-CM

## 2025-01-13 DIAGNOSIS — I48.91 ATRIAL FIBRILLATION AND FLUTTER: ICD-10-CM

## 2025-01-13 DIAGNOSIS — R79.1 ABNORMAL COAGULATION PROFILE: ICD-10-CM

## 2025-01-13 DIAGNOSIS — I48.91 ATRIAL FIBRILLATION, UNSPECIFIED TYPE (MULTI): ICD-10-CM

## 2025-01-13 LAB
POC INR: 1.4
POC PROTHROMBIN TIME: NORMAL

## 2025-01-13 PROCEDURE — 99211 OFF/OP EST MAY X REQ PHY/QHP: CPT

## 2025-01-13 PROCEDURE — 85610 PROTHROMBIN TIME: CPT | Mod: QW

## 2025-01-13 RX ORDER — WARFARIN 2 MG/1
2 TABLET ORAL SEE ADMIN INSTRUCTIONS
Qty: 90 TABLET | Refills: 1 | Status: SHIPPED | OUTPATIENT
Start: 2025-01-13

## 2025-01-13 RX ORDER — METOPROLOL SUCCINATE 50 MG/1
50 TABLET, EXTENDED RELEASE ORAL DAILY
Qty: 90 TABLET | Refills: 3 | Status: SHIPPED | OUTPATIENT
Start: 2025-01-13

## 2025-01-13 NOTE — PROGRESS NOTES
Patient identification verified with 2 identifiers.    Location: [facility] - [address] [place] [id number] [phone]     Referring Physician: Dr. Anirudh Rehman  Enrollment/ Re-enrollment date: 2025   INR Goal: 2.5-3.5  INR monitoring is per [facility] protocol.  Anticoagulation Medication: warfarin  Indication: Aortic Valve Replacement    Subjective   Bleeding signs/symptoms: No    Bruising: No   Major bleeding event: No  Thrombosis signs/symptoms: No  Thromboembolic event: No  Missed doses: No  Extra doses: No  Medication changes: No  Dietary changes: No  Change in health: No  Change in activity: No  Alcohol: No  Other concerns: No    Upcoming Procedures:  Does the Patient Have any upcoming procedures that require interruption in anticoagulation therapy? no  Does the patient require bridging? no      Anticoagulation Summary  As of 2025      INR goal:  2.5-3.5   TTR:  37.1% (1.2 y)   INR used for dosin.40 (2025)   Weekly warfarin total:  20 mg               Assessment/Plan   Subtherapeutic     1. New dose:  Increase TWD.     2. Next INR: 1 week      Education provided to patient during the visit:  Patient instructed to call in interim with questions, concerns and changes.   Patient educated on interactions between medications and warfarin.   Patient educated on dietary consistency in vitamin k consumption.   Patient educated on affects of alcohol consumption while taking warfarin.   Patient educated on signs of bleeding/clotting.   Patient educated on compliance with dosing, follow up appointments, and prescribed plan of care.

## 2025-01-28 LAB
INR IN PPP BY COAGULATION ASSAY EXTERNAL: 6.2
PROTHROMBIN TIME (PT) IN PPP BY COAGULATION ASSAY EXTERNAL: NORMAL

## 2025-01-29 ENCOUNTER — ANTICOAGULATION - WARFARIN VISIT (OUTPATIENT)
Dept: CARDIOLOGY | Facility: CLINIC | Age: OVER 89
End: 2025-01-29
Payer: MEDICARE

## 2025-01-29 DIAGNOSIS — Z95.2 HISTORY OF AORTIC VALVE REPLACEMENT: Primary | ICD-10-CM

## 2025-01-29 DIAGNOSIS — I48.21 PERMANENT ATRIAL FIBRILLATION (MULTI): ICD-10-CM

## 2025-01-29 NOTE — PROGRESS NOTES
Patient identification verified with 2 identifiers.    Location: [facility] - [address] [place], [place] [address] [phone]     Referring Physician: Dr. Anirudh Rehman  Enrollment/ Re-enrollment date: 2025   INR Goal: 2.5-3.5  INR monitoring is per [facility] protocol.  Anticoagulation Medication: warfarin  Indication: Aortic Valve Replacement    Subjective   Bleeding signs/symptoms: No    Bruising: No   Major bleeding event: No  Thrombosis signs/symptoms: No  Thromboembolic event: No  Missed doses: No  Extra doses: No  Medication changes: No  Dietary changes: No  Change in health: No  Change in activity: No  Alcohol: No  Other concerns: No    Upcoming Procedures:  Does the Patient Have any upcoming procedures that require interruption in anticoagulation therapy? no  Does the patient require bridging? no      Anticoagulation Summary  As of 2025      INR goal:  2.5-3.5   TTR:  36.5% (1.3 y)   INR used for dosin.20 (2025)   Weekly warfarin total:  19 mg               Assessment/Plan   Supratherapeutic and INR greater than 5, notified: Dr. Rehman      1. New dose:  Hold Warfarin ,  and .  Reduce TWD     2. Next INR: 1 week      Education provided to patient during the visit:  Patient instructed to call in interim with questions, concerns and changes.   Patient educated on interactions between medications and warfarin.   Patient educated on dietary consistency in vitamin k consumption.   Patient educated on affects of alcohol consumption while taking warfarin.   Patient educated on signs of bleeding/clotting.   Patient educated on compliance with dosing, follow up appointments, and prescribed plan of care.

## 2025-01-30 ENCOUNTER — TELEPHONE (OUTPATIENT)
Dept: PRIMARY CARE | Facility: CLINIC | Age: OVER 89
End: 2025-01-30
Payer: MEDICARE

## 2025-01-30 NOTE — TELEPHONE ENCOUNTER
"Pt's daughter states that pt c/o SOB, weakness, \"greenish\" color skin. States that he is currently in [place]. Daughter advised pt should be seen in ED. Daughter verbalized understanding.   "

## 2025-01-31 ENCOUNTER — DOCUMENTATION (OUTPATIENT)
Dept: CARDIOLOGY | Facility: CLINIC | Age: OVER 89
End: 2025-01-31
Payer: MEDICARE

## 2025-01-31 NOTE — PROGRESS NOTES
Pts daughter Karen left a VM stating pt is currently admitted in [place] with  respiratory infection and heart failure.  I called her back and had to leave a VM.  I requested her or pt contact clinic when pt is discharged.

## 2025-02-05 ENCOUNTER — TELEPHONE (OUTPATIENT)
Dept: CARDIOLOGY | Facility: CLINIC | Age: OVER 89
End: 2025-02-05
Payer: MEDICARE

## 2025-02-05 NOTE — TELEPHONE ENCOUNTER
Pts daughter Karen ([phone]) left  at [facility].  Pt currently admitted in [place].  Had angiogram and PTCA/stent 2/4.  Has been started on Plavix.  Karen states pt should be discharged in the next few days and will return home shortly after discharge.

## 2025-02-05 NOTE — TELEPHONE ENCOUNTER
Called and spoke with [facility].  Pt is still admitted on 6L oxygen.  Plan is to have pt fly home the weekend of 2/15-16.  Requested [facility] have INR checked 5 days after discharge from hospital.  She will contact INR clinic to update plan.

## 2025-02-07 NOTE — TELEPHONE ENCOUNTER
Karen left VM at [facility] clinic that pt was discharged 2/6.  Is doing well.  Has follow up appt with Cardiology in [place] on 2/11 and will have INR done.  Plan is to have pt return to home by end of next week.

## 2025-02-17 ENCOUNTER — ANTICOAGULATION - WARFARIN VISIT (OUTPATIENT)
Dept: CARDIOLOGY | Facility: CLINIC | Age: OVER 89
End: 2025-02-17
Payer: MEDICARE

## 2025-02-17 DIAGNOSIS — I48.21 PERMANENT ATRIAL FIBRILLATION (MULTI): ICD-10-CM

## 2025-02-17 DIAGNOSIS — Z95.2 HISTORY OF AORTIC VALVE REPLACEMENT: Primary | ICD-10-CM

## 2025-02-17 LAB
INR IN PPP BY COAGULATION ASSAY EXTERNAL: 2.4
PROTHROMBIN TIME (PT) IN PPP BY COAGULATION ASSAY EXTERNAL: NORMAL

## 2025-02-17 NOTE — PROGRESS NOTES
Patient identification verified with 2 identifiers.    Location: [facility] - [address] [place], [place] [address] [phone]     Referring Physician: Dr. Anirudh Rehman  Enrollment/ Re-enrollment date: 2025   INR Goal: 2.5-3.5  INR monitoring is per [facility] protocol.  Anticoagulation Medication: warfarin  Indication: Aortic Valve Replacement    Subjective   Bleeding signs/symptoms: No    Bruising: No   Major bleeding event: No  Thrombosis signs/symptoms: No  Thromboembolic event: No  Missed doses: No  Extra doses: No  Medication changes: No  Dietary changes: No  Change in health: No  Change in activity: No  Alcohol: No  Other concerns: No    Upcoming Procedures:  Does the Patient Have any upcoming procedures that require interruption in anticoagulation therapy? no  Does the patient require bridging? no      Anticoagulation Summary  As of 2025      INR goal:  2.5-3.5   TTR:  36.1% (1.3 y)   INR used for dosin.40 (2025)   Weekly warfarin total:  10 mg               Assessment/Plan   Subtherapeutic     1. New dose: no change    2. Next INR: 1 week      Education provided to patient during the visit:  Patient instructed to call in interim with questions, concerns and changes.   Patient educated on interactions between medications and warfarin.   Patient educated on dietary consistency in vitamin k consumption.   Patient educated on affects of alcohol consumption while taking warfarin.   Patient educated on signs of bleeding/clotting.   Patient educated on compliance with dosing, follow up appointments, and prescribed plan of care.

## 2025-02-24 ENCOUNTER — ANTICOAGULATION - WARFARIN VISIT (OUTPATIENT)
Dept: CARDIOLOGY | Facility: CLINIC | Age: OVER 89
End: 2025-02-24
Payer: MEDICARE

## 2025-02-24 DIAGNOSIS — Z95.2 HISTORY OF AORTIC VALVE REPLACEMENT: Primary | ICD-10-CM

## 2025-02-24 DIAGNOSIS — I48.21 PERMANENT ATRIAL FIBRILLATION (MULTI): ICD-10-CM

## 2025-02-24 LAB
INR IN PPP BY COAGULATION ASSAY EXTERNAL: 5.5 (ref 2.5–3.5)
PROTHROMBIN TIME (PT) IN PPP BY COAGULATION ASSAY EXTERNAL: ABNORMAL

## 2025-02-24 NOTE — PROGRESS NOTES
Patient identification verified with 2 identifiers.    Location: [facility] - [address] [place], [place] [address] [phone]     Referring Physician: Dr. Anirudh Rehman  Enrollment/ Re-enrollment date: 2025   INR Goal: 2.5-3.5  INR monitoring is per [facility] protocol.  Anticoagulation Medication: warfarin  Indication: Aortic Valve Replacement    Subjective   Bleeding signs/symptoms: No    Bruising: No   Major bleeding event: No  Thrombosis signs/symptoms: No  Thromboembolic event: No  Missed doses: No  Extra doses: No  Medication changes: No  Dietary changes: No  Change in health: No  Change in activity: No  Alcohol: No  Other concerns: No    Upcoming Procedures:  Does the Patient Have any upcoming procedures that require interruption in anticoagulation therapy? no  Does the patient require bridging? no      Anticoagulation Summary  As of 2025      INR goal:  2.5-3.5   TTR:  36.1% (1.4 y)   INR used for dosin.50 (2025)   Weekly warfarin total:  8 mg               Assessment/Plan   Received faxed INR result of 5.5 from [facility].  Pt is currently in [place] and will not be returning until mid March.  Called and spoke with pt via telephone.    Supratherapeutic  Dr. Rehman notified via staff message of INR 5.5 and of plan to hold x 2 days and retest in 2 days.    1. New dose:  Pt instructed to hold warfarin today and tomorrow.  Pt states understanding.     2. Next INR:  2025      Education provided to patient during the visit:  Patient instructed to call in interim with questions, concerns and changes.   Patient educated on interactions between medications and warfarin.   Patient educated on dietary consistency in vitamin k consumption.   Patient educated on affects of alcohol consumption while taking warfarin.   Patient educated on signs of bleeding/clotting.   Patient educated on compliance with dosing, follow up appointments, and prescribed plan of care.

## 2025-02-27 ENCOUNTER — ANTICOAGULATION - WARFARIN VISIT (OUTPATIENT)
Dept: CARDIOLOGY | Facility: CLINIC | Age: OVER 89
End: 2025-02-27
Payer: MEDICARE

## 2025-02-27 DIAGNOSIS — I48.21 PERMANENT ATRIAL FIBRILLATION (MULTI): ICD-10-CM

## 2025-02-27 DIAGNOSIS — Z95.2 HISTORY OF AORTIC VALVE REPLACEMENT: Primary | ICD-10-CM

## 2025-02-27 DIAGNOSIS — I35.9 AORTIC VALVE DISORDER: Primary | ICD-10-CM

## 2025-02-27 DIAGNOSIS — I48.0 PAROXYSMAL ATRIAL FIBRILLATION (MULTI): ICD-10-CM

## 2025-02-27 LAB
INR IN PPP BY COAGULATION ASSAY EXTERNAL: 2.2
PROTHROMBIN TIME (PT) IN PPP BY COAGULATION ASSAY EXTERNAL: NORMAL

## 2025-02-27 NOTE — PROGRESS NOTES
Patient identification verified with 2 identifiers.    Location: [facility] - [address] [place], [place] [address] [phone]     Referring Physician: Dr. Anirudh Rehman  Enrollment/ Re-enrollment date: 2025   INR Goal: 2.5-3.5  INR monitoring is per [facility] protocol.  Anticoagulation Medication: warfarin  Indication: Aortic Valve Replacement     Called and spoke to patients daughter Karen , Karen spoke to patient yesterday , patient has held warfarin since  ,  , and d/t no inr on Wednesday , also held  , Per daughter inr done this morning and results were faxed over.  Received faxed result inr 2.2 from [facility] /Dr. [phone]  Reported to Dr. Rehman inr 2.2 today after total of 3 day hold , decrease dose by 10 % not 20 % , retest in 1 week   Called Cell number and spoke to patient discussed new dosing schedule /Dr. Rehman , patient verbalized understanding and correctly read back dosing schedule , patient to retest next Wednesday morning   Called Karen back to review new dosing schedule , she will arrange transportation for Wednesday for inr lab draw , will follow up with lab order with Dr. Rehman      Subjective   Bleeding signs/symptoms: No    Bruising: No   Major bleeding event: No  Thrombosis signs/symptoms: No  Thromboembolic event: No  Missed doses: No  Extra doses: No (unknown)  Medication changes: Yes Plavix  Dietary changes: Yes  Change in health: No  Change in activity: No  Alcohol: No  Other concerns: No    Upcoming Procedures:  Does the Patient Have any upcoming procedures that require interruption in anticoagulation therapy? no  Does the patient require bridging? no      Anticoagulation Summary  As of 2025      INR goal:  2.5-3.5   TTR:  36.0% (1.4 y)   INR used for dosin.20 (2025)   Weekly warfarin total:  9 mg               Assessment/Plan   Subtherapeutic     1. New dose:  dose change      2. Next INR: 3/5        Education provided to patient during the visit:  Patient instructed to call in interim with questions, concerns and changes.   Patient educated on interactions between medications and warfarin.   Patient educated on dietary consistency in vitamin k consumption.   Patient educated on signs of bleeding/clotting.   Patient educated on compliance with dosing, follow up appointments, and prescribed plan of care.

## 2025-03-05 ENCOUNTER — ANTICOAGULATION - WARFARIN VISIT (OUTPATIENT)
Dept: CARDIOLOGY | Facility: CLINIC | Age: OVER 89
End: 2025-03-05
Payer: MEDICARE

## 2025-03-05 DIAGNOSIS — I48.21 PERMANENT ATRIAL FIBRILLATION (MULTI): ICD-10-CM

## 2025-03-05 DIAGNOSIS — Z95.2 HISTORY OF AORTIC VALVE REPLACEMENT: Primary | ICD-10-CM

## 2025-03-05 LAB
INR IN PPP BY COAGULATION ASSAY EXTERNAL: 1.8
PROTHROMBIN TIME (PT) IN PPP BY COAGULATION ASSAY EXTERNAL: NORMAL

## 2025-03-05 NOTE — PROGRESS NOTES
Patient identification verified with 2 identifiers.    Location: [facility] - [address] [place] [id number] [phone]     Referring Physician: Dr. Anirudh Rehman  Enrollment/ Re-enrollment date: 2025   INR Goal: 2.5-3.5  INR monitoring is per [facility] protocol.  Anticoagulation Medication: warfarin  Indication:  Aortic Valve Replacement    Subjective   Bleeding signs/symptoms: No    Bruising: No   Major bleeding event: No  Thrombosis signs/symptoms: No  Thromboembolic event: No  Missed doses: No  Extra doses: No  Medication changes: No  Dietary changes: No  Change in health: No  Change in activity: No  Alcohol: No  Other concerns: No    Upcoming Procedures:  Does the Patient Have any upcoming procedures that require interruption in anticoagulation therapy? no  Does the patient require bridging? no      Anticoagulation Summary  As of 3/5/2025      INR goal:  2.5-3.5   TTR:  35.6% (1.4 y)   INR used for dosin.80 (3/5/2025)   Weekly warfarin total:  11 mg               Assessment/Plan   Subtherapeutic     1. New dose:  Increase TWD     2. Next INR: 1 week      Education provided to patient during the visit:  Patient instructed to call in interim with questions, concerns and changes.   Patient educated on interactions between medications and warfarin.   Patient educated on dietary consistency in vitamin k consumption.   Patient educated on affects of alcohol consumption while taking warfarin.   Patient educated on signs of bleeding/clotting.   Patient educated on compliance with dosing, follow up appointments, and prescribed plan of care.

## 2025-03-14 ENCOUNTER — ANTICOAGULATION - WARFARIN VISIT (OUTPATIENT)
Dept: CARDIOLOGY | Facility: CLINIC | Age: OVER 89
End: 2025-03-14
Payer: MEDICARE

## 2025-03-14 DIAGNOSIS — I48.21 PERMANENT ATRIAL FIBRILLATION (MULTI): ICD-10-CM

## 2025-03-14 DIAGNOSIS — Z95.2 HISTORY OF AORTIC VALVE REPLACEMENT: Primary | ICD-10-CM

## 2025-03-14 LAB
POC INR: 2
POC PROTHROMBIN TIME: NORMAL

## 2025-03-14 PROCEDURE — 85610 PROTHROMBIN TIME: CPT | Mod: QW

## 2025-03-14 NOTE — PROGRESS NOTES
Patient identification verified with 2 identifiers.    Location: [facility] - [address] [place] [id number] [phone]     Referring Physician: Dr. Anirudh Rehman  Enrollment/ Re-enrollment date: 2025   INR Goal: 2.5-3.5  INR monitoring is per [facility] protocol.  Anticoagulation Medication: warfarin  Indication: Aortic Valve Replacement    Subjective   Bleeding signs/symptoms: No    Bruising: No   Major bleeding event: No  Thrombosis signs/symptoms: No  Thromboembolic event: No  Missed doses: Yes  Missed Tuesday's dose when traveling back home.  Extra doses: No  Medication changes: No  Dietary changes: No  Change in health: No  Change in activity: No  Alcohol: No  Other concerns: No    Upcoming Procedures:  Does the Patient Have any upcoming procedures that require interruption in anticoagulation therapy? no  Does the patient require bridging? no      Anticoagulation Summary  As of 3/14/2025      INR goal:  2.5-3.5   TTR:  35.0% (1.4 y)   INR used for dosin.00 (3/14/2025)   Weekly warfarin total:  13 mg               Assessment/Plan   Subtherapeutic     1. New dose:  Increase TWD     2. Next INR:  5 days.       Education provided to patient during the visit:  Patient instructed to call in interim with questions, concerns and changes.   Patient educated on interactions between medications and warfarin.   Patient educated on dietary consistency in vitamin k consumption.   Patient educated on affects of alcohol consumption while taking warfarin.   Patient educated on signs of bleeding/clotting.   Patient educated on compliance with dosing, follow up appointments, and prescribed plan of care.

## 2025-03-17 ENCOUNTER — TELEPHONE (OUTPATIENT)
Dept: PRIMARY CARE | Facility: CLINIC | Age: OVER 89
End: 2025-03-17
Payer: MEDICARE

## 2025-03-19 ENCOUNTER — ANTICOAGULATION - WARFARIN VISIT (OUTPATIENT)
Dept: CARDIOLOGY | Facility: CLINIC | Age: OVER 89
End: 2025-03-19
Payer: MEDICARE

## 2025-03-19 DIAGNOSIS — I48.21 PERMANENT ATRIAL FIBRILLATION (MULTI): ICD-10-CM

## 2025-03-19 DIAGNOSIS — Z95.2 HISTORY OF AORTIC VALVE REPLACEMENT: Primary | ICD-10-CM

## 2025-03-19 LAB
INR IN PPP BY COAGULATION ASSAY EXTERNAL: 2.7
PROTHROMBIN TIME (PT) IN PPP BY COAGULATION ASSAY EXTERNAL: NORMAL

## 2025-03-19 NOTE — TELEPHONE ENCOUNTER
Jasmyn from [facility] called to request order for O2 be faxed to [facility] at [phone]. States that when patient was hospitalized in [place] he was benefiting from O2 and he has continued to c/o of SOB.

## 2025-03-19 NOTE — TELEPHONE ENCOUNTER
Spoke with fernanda at [facility]. Stated just an rx for the o2 is sufficient. Placed in your office for signature.   Also states pt just needs to have DNR order signed since he is going to be living at facility. States no major discussions you were unaware of and pt was agreeable to being DNR at this time

## 2025-03-19 NOTE — PROGRESS NOTES
Patient identification verified with 2 identifiers.    Location: [facility] - [address] [place] [id number] [phone]     Referring Physician: Dr. Anirudh Rehman  Enrollment/ Re-enrollment date: 2025   INR Goal: 2.5-3.5  INR monitoring is per [facility] protocol.  Anticoagulation Medication: warfarin  Indication: Aortic Valve Replacement    Subjective   Bleeding signs/symptoms: No    Bruising: No   Major bleeding event: No  Thrombosis signs/symptoms: No  Thromboembolic event: No  Missed doses: No  Extra doses: No  Medication changes: No  Dietary changes: No  Change in health: No  Change in activity: No  Alcohol: No  Other concerns: No    Upcoming Procedures:  Does the Patient Have any upcoming procedures that require interruption in anticoagulation therapy? no  Does the patient require bridging? no      Anticoagulation Summary  As of 3/19/2025      INR goal:  2.5-3.5   TTR:  34.9% (1.4 y)   INR used for dosin.70 (3/18/2025)   Weekly warfarin total:  14 mg               Assessment/Plan   Therapeutic     1. New dose: no change    2. Next INR: 1 week      Education provided to patient during the visit:  Patient instructed to call in interim with questions, concerns and changes.   Patient educated on interactions between medications and warfarin.   Patient educated on dietary consistency in vitamin k consumption.   Patient educated on affects of alcohol consumption while taking warfarin.   Patient educated on signs of bleeding/clotting.   Patient educated on compliance with dosing, follow up appointments, and prescribed plan of care.

## 2025-03-21 ENCOUNTER — TELEPHONE (OUTPATIENT)
Dept: PRIMARY CARE | Facility: CLINIC | Age: OVER 89
End: 2025-03-21
Payer: MEDICARE

## 2025-03-25 NOTE — TELEPHONE ENCOUNTER
Nurse from [facility] states that pts INR is 2.01, PT 19.6, current dose 2 mg daily.     Please advise.

## 2025-03-26 ENCOUNTER — ANTICOAGULATION - WARFARIN VISIT (OUTPATIENT)
Dept: CARDIOLOGY | Facility: CLINIC | Age: OVER 89
End: 2025-03-26
Payer: MEDICARE

## 2025-03-26 DIAGNOSIS — I48.21 PERMANENT ATRIAL FIBRILLATION (MULTI): ICD-10-CM

## 2025-03-26 DIAGNOSIS — Z95.2 HISTORY OF AORTIC VALVE REPLACEMENT: Primary | ICD-10-CM

## 2025-03-26 LAB
INR IN PPP BY COAGULATION ASSAY EXTERNAL: 2
PROTHROMBIN TIME (PT) IN PPP BY COAGULATION ASSAY EXTERNAL: NORMAL

## 2025-03-26 NOTE — PROGRESS NOTES
Patient identification verified with 2 identifiers.    Location: [facility] - [address] [place] [id number] [phone]     Referring Physician: Dr. Anirudh Rehman  Enrollment/ Re-enrollment date: 2025   INR Goal: 2.5-3.5  INR monitoring is per [facility] protocol.  Anticoagulation Medication: warfarin  Indication: Aortic Valve Replacement    Subjective   Bleeding signs/symptoms: No    Bruising: No   Major bleeding event: No  Thrombosis signs/symptoms: No  Thromboembolic event: No  Missed doses: No  Extra doses: No  Medication changes: No  Dietary changes: No  Change in health: No  Change in activity: No  Alcohol: No  Other concerns: No    Upcoming Procedures:  Does the Patient Have any upcoming procedures that require interruption in anticoagulation therapy? no  Does the patient require bridging? no      Anticoagulation Summary  As of 3/26/2025      INR goal:  2.5-3.5   TTR:  34.8% (1.4 y)   INR used for dosin.00 (3/25/2025)   Weekly warfarin total:  16 mg               Assessment/Plan   Subtherapeutic     1. New dose:  Increase TWD     2. Next INR: 1 week      Education provided to patient during the visit:  Patient instructed to call in interim with questions, concerns and changes.   Patient educated on interactions between medications and warfarin.   Patient educated on dietary consistency in vitamin k consumption.   Patient educated on affects of alcohol consumption while taking warfarin.   Patient educated on signs of bleeding/clotting.   Patient educated on compliance with dosing, follow up appointments, and prescribed plan of care.

## 2025-03-28 ENCOUNTER — OFFICE VISIT (OUTPATIENT)
Dept: CARDIOLOGY | Facility: CLINIC | Age: OVER 89
End: 2025-03-28
Payer: MEDICARE

## 2025-03-28 VITALS
HEIGHT: 68 IN | DIASTOLIC BLOOD PRESSURE: 62 MMHG | OXYGEN SATURATION: 92 % | SYSTOLIC BLOOD PRESSURE: 118 MMHG | WEIGHT: 146.7 LBS | HEART RATE: 62 BPM | BODY MASS INDEX: 22.23 KG/M2

## 2025-03-28 DIAGNOSIS — E11.9 DIABETES MELLITUS TYPE II, NON INSULIN DEPENDENT (MULTI): ICD-10-CM

## 2025-03-28 DIAGNOSIS — I10 PRIMARY HYPERTENSION: ICD-10-CM

## 2025-03-28 DIAGNOSIS — I48.91 ATRIAL FIBRILLATION, UNSPECIFIED TYPE (MULTI): Primary | ICD-10-CM

## 2025-03-28 DIAGNOSIS — E78.5 HYPERLIPIDEMIA, UNSPECIFIED HYPERLIPIDEMIA TYPE: ICD-10-CM

## 2025-03-28 DIAGNOSIS — E61.1 LOW IRON: ICD-10-CM

## 2025-03-28 PROCEDURE — 1160F RVW MEDS BY RX/DR IN RCRD: CPT | Performed by: INTERNAL MEDICINE

## 2025-03-28 PROCEDURE — 99214 OFFICE O/P EST MOD 30 MIN: CPT | Performed by: INTERNAL MEDICINE

## 2025-03-28 PROCEDURE — 3074F SYST BP LT 130 MM HG: CPT | Performed by: INTERNAL MEDICINE

## 2025-03-28 PROCEDURE — 1157F ADVNC CARE PLAN IN RCRD: CPT | Performed by: INTERNAL MEDICINE

## 2025-03-28 PROCEDURE — 3078F DIAST BP <80 MM HG: CPT | Performed by: INTERNAL MEDICINE

## 2025-03-28 PROCEDURE — 1126F AMNT PAIN NOTED NONE PRSNT: CPT | Performed by: INTERNAL MEDICINE

## 2025-03-28 PROCEDURE — G2211 COMPLEX E/M VISIT ADD ON: HCPCS | Performed by: INTERNAL MEDICINE

## 2025-03-28 PROCEDURE — 1159F MED LIST DOCD IN RCRD: CPT | Performed by: INTERNAL MEDICINE

## 2025-03-28 RX ORDER — AMIODARONE HYDROCHLORIDE 100 MG/1
100 TABLET ORAL DAILY
Qty: 90 TABLET | Refills: 3 | Status: SHIPPED | OUTPATIENT
Start: 2025-03-28 | End: 2026-03-28

## 2025-03-28 RX ORDER — ROSUVASTATIN CALCIUM 20 MG/1
20 TABLET, COATED ORAL DAILY
Qty: 30 TABLET | Refills: 11 | Status: SHIPPED | OUTPATIENT
Start: 2025-03-28 | End: 2026-03-28

## 2025-03-28 RX ORDER — FERROUS SULFATE 325(65) MG
325 TABLET ORAL
Qty: 90 TABLET | Refills: 3 | Status: SHIPPED | OUTPATIENT
Start: 2025-03-28 | End: 2026-03-28

## 2025-03-28 RX ORDER — METOPROLOL SUCCINATE 25 MG/1
25 TABLET, EXTENDED RELEASE ORAL DAILY
Qty: 90 TABLET | Refills: 3 | Status: SHIPPED | OUTPATIENT
Start: 2025-03-28 | End: 2026-03-28

## 2025-03-28 RX ORDER — CLOPIDOGREL BISULFATE 75 MG/1
75 TABLET ORAL DAILY
Qty: 90 TABLET | Refills: 3 | Status: SHIPPED | OUTPATIENT
Start: 2025-03-28 | End: 2026-03-28

## 2025-03-28 RX ORDER — EZETIMIBE 10 MG/1
10 TABLET ORAL DAILY
Qty: 90 TABLET | Refills: 3 | Status: SHIPPED | OUTPATIENT
Start: 2025-03-28 | End: 2026-03-28

## 2025-03-28 ASSESSMENT — ENCOUNTER SYMPTOMS
CONSTITUTIONAL NEGATIVE: 1
NEUROLOGICAL NEGATIVE: 1
CARDIOVASCULAR NEGATIVE: 1
MUSCULOSKELETAL NEGATIVE: 1
RESPIRATORY NEGATIVE: 1
GASTROINTESTINAL NEGATIVE: 1

## 2025-03-28 ASSESSMENT — PAIN SCALES - GENERAL: PAINLEVEL_OUTOF10: 0-NO PAIN
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
## 2025-04-02 ENCOUNTER — ANTICOAGULATION - WARFARIN VISIT (OUTPATIENT)
Dept: CARDIOLOGY | Facility: CLINIC | Age: OVER 89
End: 2025-04-02
Payer: MEDICARE

## 2025-04-02 DIAGNOSIS — Z95.2 HISTORY OF AORTIC VALVE REPLACEMENT: Primary | ICD-10-CM

## 2025-04-02 DIAGNOSIS — I48.21 PERMANENT ATRIAL FIBRILLATION (MULTI): ICD-10-CM

## 2025-04-02 LAB
INR IN PPP BY COAGULATION ASSAY EXTERNAL: 1.9
PROTHROMBIN TIME (PT) IN PPP BY COAGULATION ASSAY EXTERNAL: NORMAL

## 2025-04-02 NOTE — PROGRESS NOTES
Patient identification verified with 2 identifiers.    Location: [facility] - [address] [place] [id number] [phone]     Referring Physician: Dr. Anirudh Rehman  Enrollment/ Re-enrollment date: 2025   INR Goal: 2.5-3.5  INR monitoring is per [facility] protocol.  Anticoagulation Medication: warfarin  Indication: Aortic Valve Replacement    Subjective   Bleeding signs/symptoms: No    Bruising: No   Major bleeding event: No  Thrombosis signs/symptoms: No  Thromboembolic event: No  Missed doses: No  Extra doses: No  Medication changes: No  Dietary changes: No  Change in health: No  Change in activity: No  Alcohol: No  Other concerns: No    Upcoming Procedures:  Does the Patient Have any upcoming procedures that require interruption in anticoagulation therapy? no  Does the patient require bridging? no      Anticoagulation Summary  As of 2025      INR goal:  2.5-3.5   TTR:  34.4% (1.4 y)   INR used for dosin.90 (2025)   Weekly warfarin total:  18 mg               Assessment/Plan   Subtherapeutic     1. New dose:  Increase TWD     2. Next INR: 1 week      Education provided to patient during the visit:  Patient instructed to call in interim with questions, concerns and changes.   Patient educated on interactions between medications and warfarin.   Patient educated on dietary consistency in vitamin k consumption.   Patient educated on affects of alcohol consumption while taking warfarin.   Patient educated on signs of bleeding/clotting.   Patient educated on compliance with dosing, follow up appointments, and prescribed plan of care.

## 2025-04-04 ENCOUNTER — APPOINTMENT (OUTPATIENT)
Dept: CARDIOLOGY | Facility: CLINIC | Age: OVER 89
End: 2025-04-04
Payer: MEDICARE

## 2025-04-08 LAB
POC INR: 4.9
POC PROTHROMBIN TIME: NORMAL

## 2025-04-09 ENCOUNTER — ANTICOAGULATION - WARFARIN VISIT (OUTPATIENT)
Dept: CARDIOLOGY | Facility: CLINIC | Age: OVER 89
End: 2025-04-09
Payer: MEDICARE

## 2025-04-09 DIAGNOSIS — Z95.2 HISTORY OF AORTIC VALVE REPLACEMENT: Primary | ICD-10-CM

## 2025-04-09 DIAGNOSIS — I48.21 PERMANENT ATRIAL FIBRILLATION (MULTI): ICD-10-CM

## 2025-04-09 NOTE — PROGRESS NOTES
Patient identification verified with 2 identifiers.    Location: [facility] - [address] [place] [id number] [phone]     Referring Physician: Dr. Anirudh Rehman  Enrollment/ Re-enrollment date: 8/1/2025   INR Goal: 2.5-3.5  INR monitoring is per [facility] protocol.  Anticoagulation Medication: warfarin  Indication: Aortic Valve Replacement    Subjective   Bleeding signs/symptoms: No    Bruising: No   Major bleeding event: No  Thrombosis signs/symptoms: No  Thromboembolic event: No  Missed doses: No  Extra doses: No  Medication changes: No  Dietary changes: No  Change in health: No  Change in activity: No  Alcohol: No  Other concerns: No    Upcoming Procedures:  Does the Patient Have any upcoming procedures that require interruption in anticoagulation therapy? no  Does the patient require bridging? no      Anticoagulation Summary  As of 4/9/2025      INR goal:  2.5-3.5   TTR:  34.4% (1.4 y)   INR used for dosing:  --               Assessment/Plan   Supratherapeutic     1. New dose:  Hold Warfarin 4/9 & 4/10, reduce TWD     2. Next INR: 1 week      Education provided to patient during the visit:  Patient instructed to call in interim with questions, concerns and changes.   Patient educated on interactions between medications and warfarin.   Patient educated on dietary consistency in vitamin k consumption.   Patient educated on affects of alcohol consumption while taking warfarin.   Patient educated on signs of bleeding/clotting.   Patient educated on compliance with dosing, follow up appointments, and prescribed plan of care.

## 2025-04-15 ENCOUNTER — ANTICOAGULATION - WARFARIN VISIT (OUTPATIENT)
Dept: CARDIOLOGY | Facility: CLINIC | Age: OVER 89
End: 2025-04-15
Payer: MEDICARE

## 2025-04-15 DIAGNOSIS — Z95.2 HISTORY OF AORTIC VALVE REPLACEMENT: Primary | ICD-10-CM

## 2025-04-15 DIAGNOSIS — I48.21 PERMANENT ATRIAL FIBRILLATION (MULTI): ICD-10-CM

## 2025-04-15 LAB
INR IN PPP BY COAGULATION ASSAY EXTERNAL: 1.8 (ref 2.5–3.5)
PROTHROMBIN TIME (PT) IN PPP BY COAGULATION ASSAY EXTERNAL: ABNORMAL

## 2025-04-15 NOTE — PROGRESS NOTES
Patient identification verified with 2 identifiers.    Location: [facility] - [address] [place] [id number] [phone]  Lab result reported from [place] via telephone.    Referring Physician: Dr. Anirudh Rehman  Enrollment/ Re-enrollment date: 2025   INR Goal: 2.5-3.5  INR monitoring is per [facility] protocol.  Anticoagulation Medication: warfarin  Indication: Aortic Valve Replacement    Subjective   Bleeding signs/symptoms: No    Bruising: No   Major bleeding event: No  Thrombosis signs/symptoms: No  Thromboembolic event: No  Missed doses: No  Extra doses: No  Medication changes: No  Dietary changes: No  Change in health: No  Change in activity: No  Alcohol: No  Other concerns: No    Upcoming Procedures:  Does the Patient Have any upcoming procedures that require interruption in anticoagulation therapy? no  Does the patient require bridging? no      Anticoagulation Summary  As of 4/15/2025      INR goal:  2.5-3.5   TTR:  34.3% (1.5 y)   INR used for dosin.80 (4/15/2025)   Weekly warfarin total:  19 mg               Assessment/Plan   Subtherapeutic     1. New dose:  Increase TWD approximately 15 % per protocol     2. Next INR: 1 week      Education provided to patient during the visit:  Patient instructed to call in interim with questions, concerns and changes.   Patient educated on interactions between medications and warfarin.   Patient educated on dietary consistency in vitamin k consumption.   Patient educated on affects of alcohol consumption while taking warfarin.   Patient educated on signs of bleeding/clotting.   Patient educated on compliance with dosing, follow up appointments, and prescribed plan of care.

## 2025-04-18 ENCOUNTER — TELEPHONE (OUTPATIENT)
Dept: PRIMARY CARE | Facility: CLINIC | Age: OVER 89
End: 2025-04-18
Payer: MEDICARE

## 2025-04-18 DIAGNOSIS — Z86.73 HISTORY OF STROKE WITHOUT RESIDUAL DEFICITS: ICD-10-CM

## 2025-04-22 LAB
INR IN PPP BY COAGULATION ASSAY EXTERNAL: 4.4
PROTHROMBIN TIME (PT) IN PPP BY COAGULATION ASSAY EXTERNAL: NORMAL

## 2025-04-23 ENCOUNTER — ANTICOAGULATION - WARFARIN VISIT (OUTPATIENT)
Dept: CARDIOLOGY | Facility: CLINIC | Age: OVER 89
End: 2025-04-23
Payer: MEDICARE

## 2025-04-23 DIAGNOSIS — I48.21 PERMANENT ATRIAL FIBRILLATION (MULTI): ICD-10-CM

## 2025-04-23 DIAGNOSIS — Z95.2 HISTORY OF AORTIC VALVE REPLACEMENT: Primary | ICD-10-CM

## 2025-04-23 NOTE — PROGRESS NOTES
Patient identification verified with 2 identifiers.    Location: [facility] - [address] [place], [place] [address] [phone]     Referring Physician: Dr. Anirudh Rehman  Enrollment/ Re-enrollment date: 2025   INR Goal: 2.5-3.5  INR monitoring is per [facility] protocol.  Anticoagulation Medication: warfarin  Indication: Aortic Valve Replacement    Subjective   Bleeding signs/symptoms: No    Bruising: No   Major bleeding event: No  Thrombosis signs/symptoms: No  Thromboembolic event: No  Missed doses: No  Extra doses: No  Medication changes: No  Dietary changes: No  Change in health: No  Change in activity: No  Alcohol: No  Other concerns: No    Upcoming Procedures:  Does the Patient Have any upcoming procedures that require interruption in anticoagulation therapy? no  Does the patient require bridging? no      Anticoagulation Summary  As of 2025      INR goal:  2.5-3.5   TTR:  34.4% (1.5 y)   INR used for dosin.40 (2025)   Weekly warfarin total:  19 mg               Assessment/Plan   Supratherapeutic     1. New dose:  Reduce TWD. Dose was held 25.     2. Next INR: 1 week      Education provided to patient during the visit:  Patient instructed to call in interim with questions, concerns and changes.   Patient educated on interactions between medications and warfarin.   Patient educated on dietary consistency in vitamin k consumption.   Patient educated on affects of alcohol consumption while taking warfarin.   Patient educated on signs of bleeding/clotting.   Patient educated on compliance with dosing, follow up appointments, and prescribed plan of care.

## 2025-04-29 LAB
INR IN PPP BY COAGULATION ASSAY EXTERNAL: 2.1
PROTHROMBIN TIME (PT) IN PPP BY COAGULATION ASSAY EXTERNAL: NORMAL

## 2025-04-30 ENCOUNTER — ANTICOAGULATION - WARFARIN VISIT (OUTPATIENT)
Dept: CARDIOLOGY | Facility: CLINIC | Age: OVER 89
End: 2025-04-30
Payer: MEDICARE

## 2025-04-30 DIAGNOSIS — I48.21 PERMANENT ATRIAL FIBRILLATION (MULTI): ICD-10-CM

## 2025-04-30 DIAGNOSIS — Z95.2 HISTORY OF AORTIC VALVE REPLACEMENT: Primary | ICD-10-CM

## 2025-04-30 NOTE — PROGRESS NOTES
Patient identification verified with 2 identifiers.    Location: [facility] - [address] [place] [id number] [phone]     Referring Physician: Dr. Anirudh Rehman  Enrollment/ Re-enrollment date: 2025   INR Goal: 2.5-3.5  INR monitoring is per [facility] protocol.  Anticoagulation Medication: warfarin  Indication: Aortic Valve Replacement    Subjective   Bleeding signs/symptoms: No    Bruising: No   Major bleeding event: No  Thrombosis signs/symptoms: No  Thromboembolic event: No  Missed doses: No  Extra doses: No  Medication changes: No  Dietary changes: No  Change in health: No  Change in activity: No  Alcohol: No  Other concerns: No    Upcoming Procedures:  Does the Patient Have any upcoming procedures that require interruption in anticoagulation therapy? no  Does the patient require bridging? no      Anticoagulation Summary  As of 2025      INR goal:  2.5-3.5   TTR:  34.5% (1.5 y)   INR used for dosin.10 (2025)   Weekly warfarin total:  19 mg               Assessment/Plan   Subtherapeutic     1. New dose: Increase TWD    2. Next INR: 1 week      Education provided to patient during the visit:  Patient instructed to call in interim with questions, concerns and changes.   Patient educated on interactions between medications and warfarin.   Patient educated on dietary consistency in vitamin k consumption.   Patient educated on affects of alcohol consumption while taking warfarin.   Patient educated on signs of bleeding/clotting.   Patient educated on compliance with dosing, follow up appointments, and prescribed plan of care.

## 2025-05-07 ENCOUNTER — EVALUATION (OUTPATIENT)
Dept: OCCUPATIONAL THERAPY | Facility: CLINIC | Age: OVER 89
End: 2025-05-07
Payer: MEDICARE

## 2025-05-07 ENCOUNTER — ANTICOAGULATION - WARFARIN VISIT (OUTPATIENT)
Dept: CARDIOLOGY | Facility: CLINIC | Age: OVER 89
End: 2025-05-07

## 2025-05-07 DIAGNOSIS — Z95.2 HISTORY OF AORTIC VALVE REPLACEMENT: Primary | ICD-10-CM

## 2025-05-07 DIAGNOSIS — I48.21 PERMANENT ATRIAL FIBRILLATION (MULTI): ICD-10-CM

## 2025-05-07 DIAGNOSIS — Z86.73 HISTORY OF STROKE WITHOUT RESIDUAL DEFICITS: ICD-10-CM

## 2025-05-07 PROCEDURE — 97165 OT EVAL LOW COMPLEX 30 MIN: CPT | Mod: GO

## 2025-05-07 PROCEDURE — 97530 THERAPEUTIC ACTIVITIES: CPT | Mod: GO

## 2025-05-07 ASSESSMENT — ENCOUNTER SYMPTOMS
LOSS OF SENSATION IN FEET: 0
OCCASIONAL FEELINGS OF UNSTEADINESS: 0
DEPRESSION: 0

## 2025-05-07 NOTE — PROGRESS NOTES
Occupational Therapy    Evaluation/Treatment  5/5/25 - [employer] MED[employer]ARE / NO AUTH / [place] VISITS / 96% COVERAGE / OOP MET / AVAILITY #  [id number]  JS  Patient Name: Jose Martin Fleming  MRN: [id number]  Department: [address]  Room: Room/bed info not found  Today's Date: 05/07/25     Time Calculation  Start Time: 0955  Stop Time: 1100  Time Calculation (min): 65 min  Low Evaluation- 35 mins  Therapeutic Act-30 mins    Assessment:    Referral received, chart reviewed, and evaluation complete.  Upon completion of driving assessment it has been determined that client is safe to resume driving with the following restrictions.  No highway or night time driving.  Travel should be limited to 15 mins from the origin to the destination.  This decision was based on testing and results detailed in this report.  Client is in agreement with this plan.        Subjective   Current Problem:  1. History of stroke without residual deficits  Referral to Occupational Therapy        General:   OT Received On: 05/07/25  General  Reason for Referral: driving assessment  Referred By: Ryan Gonzalez MD  Past Medical History Relevant to Rehab: chronic Afib, OA, HCL, HTN, aortic valve stenosis, CHF, anemia, Vitamin B deficiency  Family/Caregiver Present: Yes  Caregiver Feedback: caregiver is present to transport client only and has provided no input  General Comment: 89 year old WM presents for driving assessment due to recent cardiac event     Driving History:   Client reports that he had a cardiac event in January 2025.  Per client report he states that the cardiologist cleared him to drive short distances 10-15 minutes from home.  However, his PCP recommended a formal driving assessment to determine if he is safe to drive.  He is currently driving short distances from home in the day time only.  He only uses local streets.  Client reports he does not go on the highway.  He states he is the only  in his household.  His spouse is no  longer able to drive.  He was transported to the appointment by a caregiver.     Rules of the Road Test: 8/11 (73%)    Vision Screening- Wear reading glasses, visual acuity is 20/40, tracking/scanning/peripheral WFL    Hearing: WFL    Precautions: none     Pain: 1/10 right shoulder    Physical Assessment:  BUE/BLE strength/ROM WFL, FMC impaired, GMC WFL, Endurance/Sensation/Posture/Balance WFL    Cognitive Assessment:    Tests Administered;    SBT score is 6 which indicates questionable impairment.  [place] Making A score is 35.57 seconds.  [place] Making B score is 95 seconds    Client is alert and oriented x3.  (Person/place/situation).  He incorrectly stated the year as 2024.    He passed 3/3 cognitive assessments.  Demonstrates the ability to learn new information and apply it as indicated.  Client uses good safety awareness and reaction time on the road and.    Psychosocial Assessment- interacts appropriately    Brake Reaction Test: .64 (.8 or less is passing)    Selected Drives Performed:  Warm up 1 and 2, Brake Reaction Test, [address]    Clinical Observations:   Initially when performing Warm ups 1 and 2 client had significant difficulty steering and managing his speed.   Instructed client how to  position of car in relation to the roadway on the simulator.  When he performed the [address] he displayed evidence of learning.  The issues surrounding steering and speed control had resolved.  He executed the drive with just one minor error.  He used good safety awareness and defensive driving techniques.  Brake reaction time was sufficient to avoid all potential collisions on the simulator    Problem Areas Identified:  Endurance- lacks stamina for long distance driving  Vision-decreased visual capabilities at night  3.   Highway driving- feels uncomfortable due to fast pace of traffic     Recommendations;   No night driving   No highway driving   3.  Limit travel to 15 mins from origin to destination

## 2025-05-07 NOTE — PROGRESS NOTES
Patient identification verified with 2 identifiers.    Location: [facility] - [address] [place], [place] [address] [phone]     Referring Physician: Dr. Anirudh Rehman  Enrollment/ Re-enrollment date: 2025   INR Goal: 2.5-3.5  INR monitoring is per [facility] protocol.  Anticoagulation Medication: warfarin  Indication: Aortic Valve Replacement    Subjective   Bleeding signs/symptoms: No    Bruising: No   Major bleeding event: No  Thrombosis signs/symptoms: No  Thromboembolic event: No  Missed doses: No  Extra doses: No  Medication changes: No  Dietary changes: No  Change in health: No  Change in activity: No  Alcohol: No  Other concerns: No    Upcoming Procedures:  Does the Patient Have any upcoming procedures that require interruption in anticoagulation therapy? no  Does the patient require bridging? no      Anticoagulation Summary  As of 2025      INR goal:  2.5-3.5   TTR:  34.1% (1.5 y)   INR used for dosin.10 (2025)   Weekly warfarin total:  20 mg               Assessment/Plan   Subtherapeutic     1. New dose: Increase TWD    2. Next INR: 1 week      Education provided to patient during the visit:  Patient instructed to call in interim with questions, concerns and changes.   Patient educated on interactions between medications and warfarin.   Patient educated on dietary consistency in vitamin k consumption.   Patient educated on affects of alcohol consumption while taking warfarin.   Patient educated on signs of bleeding/clotting.   Patient educated on compliance with dosing, follow up appointments, and prescribed plan of care.

## 2025-05-09 ENCOUNTER — OFFICE VISIT (OUTPATIENT)
Dept: CARDIOLOGY | Facility: CLINIC | Age: OVER 89
End: 2025-05-09
Payer: MEDICARE

## 2025-05-09 VITALS
SYSTOLIC BLOOD PRESSURE: 118 MMHG | HEART RATE: 72 BPM | DIASTOLIC BLOOD PRESSURE: 56 MMHG | HEIGHT: 68 IN | OXYGEN SATURATION: 93 % | WEIGHT: 150.8 LBS | BODY MASS INDEX: 22.85 KG/M2

## 2025-05-09 DIAGNOSIS — Z95.2 HISTORY OF AORTIC VALVE REPLACEMENT: Primary | ICD-10-CM

## 2025-05-09 DIAGNOSIS — I48.21 PERMANENT ATRIAL FIBRILLATION (MULTI): ICD-10-CM

## 2025-05-09 PROCEDURE — 3074F SYST BP LT 130 MM HG: CPT | Performed by: INTERNAL MEDICINE

## 2025-05-09 PROCEDURE — 99214 OFFICE O/P EST MOD 30 MIN: CPT | Performed by: INTERNAL MEDICINE

## 2025-05-09 PROCEDURE — 1126F AMNT PAIN NOTED NONE PRSNT: CPT | Performed by: INTERNAL MEDICINE

## 2025-05-09 PROCEDURE — G2211 COMPLEX E/M VISIT ADD ON: HCPCS | Performed by: INTERNAL MEDICINE

## 2025-05-09 PROCEDURE — 1159F MED LIST DOCD IN RCRD: CPT | Performed by: INTERNAL MEDICINE

## 2025-05-09 PROCEDURE — 3078F DIAST BP <80 MM HG: CPT | Performed by: INTERNAL MEDICINE

## 2025-05-09 PROCEDURE — 1160F RVW MEDS BY RX/DR IN RCRD: CPT | Performed by: INTERNAL MEDICINE

## 2025-05-09 RX ORDER — FUROSEMIDE 40 MG/1
40 TABLET ORAL DAILY
COMMUNITY

## 2025-05-09 ASSESSMENT — ENCOUNTER SYMPTOMS
CARDIOVASCULAR NEGATIVE: 1
OCCASIONAL FEELINGS OF UNSTEADINESS: 0
DEPRESSION: 0
RESPIRATORY NEGATIVE: 1
LOSS OF SENSATION IN FEET: 0
NEUROLOGICAL NEGATIVE: 1
MUSCULOSKELETAL NEGATIVE: 1
GASTROINTESTINAL NEGATIVE: 1
CONSTITUTIONAL NEGATIVE: 1

## 2025-05-09 ASSESSMENT — PAIN SCALES - GENERAL: PAINLEVEL_OUTOF10: 0-NO PAIN
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
## 2025-05-13 LAB
INR IN PPP BY COAGULATION ASSAY EXTERNAL: 4.2
PROTHROMBIN TIME (PT) IN PPP BY COAGULATION ASSAY EXTERNAL: NORMAL

## 2025-05-14 ENCOUNTER — ANTICOAGULATION - WARFARIN VISIT (OUTPATIENT)
Dept: CARDIOLOGY | Facility: CLINIC | Age: OVER 89
End: 2025-05-14
Payer: MEDICARE

## 2025-05-14 DIAGNOSIS — I48.21 PERMANENT ATRIAL FIBRILLATION (MULTI): ICD-10-CM

## 2025-05-14 DIAGNOSIS — Z95.2 HISTORY OF AORTIC VALVE REPLACEMENT: Primary | ICD-10-CM

## 2025-05-14 NOTE — PROGRESS NOTES
Patient identification verified with 2 identifiers.    Location: [facility] - [address] [place] [id number] [phone]     Referring Physician: Dr. Anirudh Rehman  Enrollment/ Re-enrollment date: 2025   INR Goal: 2.5-3.5  INR monitoring is per [facility] protocol.  Anticoagulation Medication: warfarin  Indication: Aortic Valve Replacement    Subjective   Bleeding signs/symptoms: No    Bruising: No   Major bleeding event: No  Thrombosis signs/symptoms: No  Thromboembolic event: No  Missed doses: No  Extra doses: No  Medication changes: No  Dietary changes: No  Change in health: No  Change in activity: No  Alcohol: No  Other concerns: No    Upcoming Procedures:  Does the Patient Have any upcoming procedures that require interruption in anticoagulation therapy? no  Does the patient require bridging? no      Anticoagulation Summary  As of 2025      INR goal:  2.5-3.5   TTR:  34.2% (1.6 y)   INR used for dosin.20 (2025)   Weekly warfarin total:  19 mg               Assessment/Plan   Supratherapeutic     1. New dose: Hold Warfarin .  Reduce TWD.     2. Next INR: 1 week      Education provided to patient during the visit:  Patient instructed to call in interim with questions, concerns and changes.   Patient educated on interactions between medications and warfarin.   Patient educated on dietary consistency in vitamin k consumption.   Patient educated on affects of alcohol consumption while taking warfarin.   Patient educated on signs of bleeding/clotting.   Patient educated on compliance with dosing, follow up appointments, and prescribed plan of care.

## 2025-05-20 LAB
INR IN PPP BY COAGULATION ASSAY EXTERNAL: 2.5
PROTHROMBIN TIME (PT) IN PPP BY COAGULATION ASSAY EXTERNAL: NORMAL

## 2025-05-21 ENCOUNTER — ANTICOAGULATION - WARFARIN VISIT (OUTPATIENT)
Dept: CARDIOLOGY | Facility: CLINIC | Age: OVER 89
End: 2025-05-21
Payer: MEDICARE

## 2025-05-21 DIAGNOSIS — I48.21 PERMANENT ATRIAL FIBRILLATION (MULTI): ICD-10-CM

## 2025-05-21 DIAGNOSIS — Z95.2 HISTORY OF AORTIC VALVE REPLACEMENT: Primary | ICD-10-CM

## 2025-05-21 NOTE — PROGRESS NOTES
Patient identification verified with 2 identifiers.    Location: [facility] - [address] [place], [place] [address] [phone]     Referring Physician: Dr. Anirudh Rehman  Enrollment/ Re-enrollment date: 2025   INR Goal: 2.5-3.5  INR monitoring is per [facility] protocol.  Anticoagulation Medication: warfarin  Indication: Aortic Valve Replacement    Subjective   Bleeding signs/symptoms: No    Bruising: No   Major bleeding event: No  Thrombosis signs/symptoms: No  Thromboembolic event: No  Missed doses: No  Extra doses: No  Medication changes: No  Dietary changes: No  Change in health: No  Change in activity: No  Alcohol: No  Other concerns: No    Upcoming Procedures:  Does the Patient Have any upcoming procedures that require interruption in anticoagulation therapy? no  Does the patient require bridging? no      Anticoagulation Summary  As of 2025      INR goal:  2.5-3.5   TTR:  34.5% (1.6 y)   INR used for dosin.50 (2025)   Weekly warfarin total:  19 mg               Assessment/Plan   Therapeutic     1. New dose: no change    2. Next INR: 1 week      Education provided to patient during the visit:  Patient instructed to call in interim with questions, concerns and changes.   Patient educated on interactions between medications and warfarin.   Patient educated on dietary consistency in vitamin k consumption.   Patient educated on affects of alcohol consumption while taking warfarin.   Patient educated on signs of bleeding/clotting.   Patient educated on compliance with dosing, follow up appointments, and prescribed plan of care.

## 2025-05-28 ENCOUNTER — ANTICOAGULATION - WARFARIN VISIT (OUTPATIENT)
Dept: CARDIOLOGY | Facility: CLINIC | Age: OVER 89
End: 2025-05-28
Payer: MEDICARE

## 2025-05-28 DIAGNOSIS — I48.21 PERMANENT ATRIAL FIBRILLATION (MULTI): ICD-10-CM

## 2025-05-28 DIAGNOSIS — Z95.2 HISTORY OF AORTIC VALVE REPLACEMENT: Primary | ICD-10-CM

## 2025-05-28 PROCEDURE — 99211 OFF/OP EST MAY X REQ PHY/QHP: CPT

## 2025-05-28 NOTE — PROGRESS NOTES
Patient identification verified with 2 identifiers.    Location: [facility] - [address] [place], [place] [address] [phone]     Referring Physician: Dr. Anirudh Rehman  Enrollment/ Re-enrollment date: 2025   INR Goal: 2.5-3.5  INR monitoring is per [facility] protocol.  Anticoagulation Medication: warfarin  Indication: Aortic Valve Replacement    Subjective   Bleeding signs/symptoms: No    Bruising: No   Major bleeding event: No  Thrombosis signs/symptoms: No  Thromboembolic event: No  Missed doses: No  Extra doses: No  Medication changes: No  Dietary changes: No  Change in health: No  Change in activity: No  Alcohol: No  Other concerns: No    Upcoming Procedures:  Does the Patient Have any upcoming procedures that require interruption in anticoagulation therapy? no  Does the patient require bridging? no      Anticoagulation Summary  As of 2025      INR goal:  2.5-3.5   TTR:  34.1% (1.6 y)   INR used for dosin.90 (2025)   Weekly warfarin total:  20 mg               Assessment/Plan   Subtherapeutic     1. New dose: Increase TWD    2. Next INR: 1 week      Education provided to patient during the visit:  Patient instructed to call in interim with questions, concerns and changes.   Patient educated on interactions between medications and warfarin.   Patient educated on dietary consistency in vitamin k consumption.   Patient educated on affects of alcohol consumption while taking warfarin.   Patient educated on signs of bleeding/clotting.   Patient educated on compliance with dosing, follow up appointments, and prescribed plan of care.

## 2025-06-03 LAB
INR IN PPP BY COAGULATION ASSAY EXTERNAL: 2.3
PROTHROMBIN TIME (PT) IN PPP BY COAGULATION ASSAY EXTERNAL: NORMAL

## 2025-06-04 ENCOUNTER — ANTICOAGULATION - WARFARIN VISIT (OUTPATIENT)
Dept: CARDIOLOGY | Facility: CLINIC | Age: OVER 89
End: 2025-06-04
Payer: MEDICARE

## 2025-06-04 DIAGNOSIS — I48.21 PERMANENT ATRIAL FIBRILLATION (MULTI): ICD-10-CM

## 2025-06-04 DIAGNOSIS — Z95.2 HISTORY OF AORTIC VALVE REPLACEMENT: Primary | ICD-10-CM

## 2025-06-04 NOTE — PROGRESS NOTES
Patient identification verified with 2 identifiers.    Location: [facility] - [address] [place] [id number] [phone]     Referring Physician: Dr. Anirudh Rehman  Enrollment/ Re-enrollment date: 2025   INR Goal: 2.5-3.5  INR monitoring is per [facility] protocol.  Anticoagulation Medication: warfarin  Indication: Aortic Valve Replacement    Subjective   Bleeding signs/symptoms: No    Bruising: No   Major bleeding event: No  Thrombosis signs/symptoms: No  Thromboembolic event: No  Missed doses: No  Extra doses: No  Medication changes: No  Dietary changes: No  Change in health: No  Change in activity: No  Alcohol: No  Other concerns: No    Upcoming Procedures:  Does the Patient Have any upcoming procedures that require interruption in anticoagulation therapy? no  Does the patient require bridging? no      Anticoagulation Summary  As of 2025      INR goal:  2.5-3.5   TTR:  33.7% (1.6 y)   INR used for dosin.30 (6/3/2025)   Weekly warfarin total:  21 mg               Assessment/Plan   Subtherapeutic     1. New dose: Increase TWD    2. Next INR: 1 week      Education provided to patient during the visit:  Patient instructed to call in interim with questions, concerns and changes.   Patient educated on interactions between medications and warfarin.   Patient educated on dietary consistency in vitamin k consumption.   Patient educated on affects of alcohol consumption while taking warfarin.   Patient educated on signs of bleeding/clotting.   Patient educated on compliance with dosing, follow up appointments, and prescribed plan of care.

## 2025-06-11 ENCOUNTER — ANTICOAGULATION - WARFARIN VISIT (OUTPATIENT)
Dept: CARDIOLOGY | Facility: CLINIC | Age: OVER 89
End: 2025-06-11
Payer: MEDICARE

## 2025-06-11 DIAGNOSIS — I48.21 PERMANENT ATRIAL FIBRILLATION (MULTI): ICD-10-CM

## 2025-06-11 DIAGNOSIS — Z95.2 HISTORY OF AORTIC VALVE REPLACEMENT: Primary | ICD-10-CM

## 2025-06-11 NOTE — PROGRESS NOTES
Patient identification verified with 2 identifiers.    Location: [facility] - [address] [place] [id number] [phone]     Referring Physician: Dr. Anirudh Rehman  Enrollment/ Re-enrollment date: 2025   INR Goal: 2.5-3.5  INR monitoring is per [facility] protocol.  Anticoagulation Medication: warfarin  Indication: Aortic Valve Replacement    Subjective   Bleeding signs/symptoms: No    Bruising: No   Major bleeding event: No  Thrombosis signs/symptoms: No  Thromboembolic event: No  Missed doses: No  Extra doses: No  Medication changes: No  Dietary changes: No  Change in health: No  Change in activity: No  Alcohol: No  Other concerns: No    Upcoming Procedures:  Does the Patient Have any upcoming procedures that require interruption in anticoagulation therapy? no  Does the patient require bridging? no      Anticoagulation Summary  As of 2025      INR goal:  2.5-3.5   TTR:  33.3% (1.6 y)   INR used for dosin.30 (6/10/2025)   Weekly warfarin total:  22 mg               Assessment/Plan   Subtherapeutic     1. New dose: Increase TWD.    2. Next INR: 1 week      Education provided to patient during the visit:  Patient instructed to call in interim with questions, concerns and changes.   Patient educated on interactions between medications and warfarin.   Patient educated on dietary consistency in vitamin k consumption.   Patient educated on affects of alcohol consumption while taking warfarin.   Patient educated on signs of bleeding/clotting.   Patient educated on compliance with dosing, follow up appointments, and prescribed plan of care.

## 2025-06-13 ENCOUNTER — APPOINTMENT (OUTPATIENT)
Dept: PRIMARY CARE | Facility: CLINIC | Age: OVER 89
End: 2025-06-13
Payer: MEDICARE

## 2025-06-17 LAB
POC INR: 2 (ref 0.9–1.1)
POC PROTHROMBIN TIME: ABNORMAL (ref 9.3–12.5)

## 2025-06-18 ENCOUNTER — ANTICOAGULATION - WARFARIN VISIT (OUTPATIENT)
Dept: CARDIOLOGY | Facility: CLINIC | Age: OVER 89
End: 2025-06-18
Payer: MEDICARE

## 2025-06-18 DIAGNOSIS — I48.21 PERMANENT ATRIAL FIBRILLATION (MULTI): ICD-10-CM

## 2025-06-18 DIAGNOSIS — Z95.2 HISTORY OF AORTIC VALVE REPLACEMENT: Primary | ICD-10-CM

## 2025-06-18 NOTE — PROGRESS NOTES
Patient identification verified with 2 identifiers.    Location: [facility] - [address] [place] [id number] [phone]     Referring Physician: Dr. Anirudh Rehman  Enrollment/ Re-enrollment date: 2025   INR Goal: 2.5-3.5  INR monitoring is per [facility] protocol.  Anticoagulation Medication: warfarin  Indication: Aortic Valve Replacement    Subjective   Bleeding signs/symptoms: No    Bruising: No   Major bleeding event: No  Thrombosis signs/symptoms: No  Thromboembolic event: No  Missed doses: No  Extra doses: No  Medication changes: No  Dietary changes: No  Change in health: No  Change in activity: No  Alcohol: No  Other concerns: No    Upcoming Procedures:  Does the Patient Have any upcoming procedures that require interruption in anticoagulation therapy? no  Does the patient require bridging? no      Anticoagulation Summary  As of 2025      INR goal:  2.5-3.5   TTR:  32.9% (1.7 y)   INR used for dosin.00 (2025)   Weekly warfarin total:  23 mg               Assessment/Plan   Subtherapeutic     1. New dose: Increase TWD    2. Next INR: 1 week      Education provided to patient during the visit:  Patient instructed to call in interim with questions, concerns and changes.   Patient educated on interactions between medications and warfarin.   Patient educated on dietary consistency in vitamin k consumption.   Patient educated on affects of alcohol consumption while taking warfarin.   Patient educated on signs of bleeding/clotting.   Patient educated on compliance with dosing, follow up appointments, and prescribed plan of care.

## 2025-06-27 ENCOUNTER — DOCUMENTATION (OUTPATIENT)
Dept: CARDIOLOGY | Facility: CLINIC | Age: OVER 89
End: 2025-06-27
Payer: MEDICARE

## 2025-06-27 NOTE — PROGRESS NOTES
Did not receive INR result this week from [facility].  Called and spoke with RN Katerine who states pt was at an appointment on Tues and missed lab draw.  They will be out today to draw and will call results if they receive them back today.

## 2025-07-02 ENCOUNTER — ANTICOAGULATION - WARFARIN VISIT (OUTPATIENT)
Dept: CARDIOLOGY | Facility: CLINIC | Age: OVER 89
End: 2025-07-02
Payer: MEDICARE

## 2025-07-02 DIAGNOSIS — I48.21 PERMANENT ATRIAL FIBRILLATION (MULTI): ICD-10-CM

## 2025-07-02 DIAGNOSIS — Z95.2 HISTORY OF AORTIC VALVE REPLACEMENT: Primary | ICD-10-CM

## 2025-07-02 NOTE — PROGRESS NOTES
Patient identification verified with 2 identifiers.    Location: [facility] - [address] [place], [place] [address] [phone]     Referring Physician: Dr. Anirudh Rehman  Enrollment/ Re-enrollment date: 2025 electronically sent   INR Goal: 2.5-3.5  INR monitoring is per [facility] protocol.  Anticoagulation Medication: warfarin  Indication: Aortic Valve Replacement      Subjective   Bleeding signs/symptoms: No    Bruising: No   Major bleeding event: No  Thrombosis signs/symptoms: No  Thromboembolic event: No  Missed doses: No  Extra doses: No  Medication changes: No  Dietary changes: No  Change in health: No  Change in activity: No  Alcohol: No  Other concerns: No    Upcoming Procedures:  Does the Patient Have any upcoming procedures that require interruption in anticoagulation therapy? no  Does the patient require bridging? no      Anticoagulation Summary  As of 2025      INR goal:  2.5-3.5   TTR:  32.2% (1.7 y)   INR used for dosin.10 (2025)   Weekly warfarin total:  25 mg               Assessment/Plan   Subtherapeutic     1. New dose: dose change     2. Next INR: 1 week       Education provided to patient during the visit:  Patient instructed to call in interim with questions, concerns and changes.   Patient educated on signs of bleeding/clotting.   Patient educated on compliance with dosing, follow up appointments, and prescribed plan of care.

## 2025-07-14 ENCOUNTER — OFFICE VISIT (OUTPATIENT)
Dept: PRIMARY CARE | Facility: CLINIC | Age: OVER 89
End: 2025-07-14
Payer: MEDICARE

## 2025-07-14 VITALS
TEMPERATURE: 97.8 F | WEIGHT: 157 LBS | SYSTOLIC BLOOD PRESSURE: 130 MMHG | HEART RATE: 61 BPM | HEIGHT: 68 IN | OXYGEN SATURATION: 94 % | BODY MASS INDEX: 23.79 KG/M2 | DIASTOLIC BLOOD PRESSURE: 70 MMHG

## 2025-07-14 DIAGNOSIS — R73.9 HYPERGLYCEMIA: Primary | ICD-10-CM

## 2025-07-14 DIAGNOSIS — I10 PRIMARY HYPERTENSION: ICD-10-CM

## 2025-07-14 DIAGNOSIS — E53.8 VITAMIN B12 DEFICIENCY: ICD-10-CM

## 2025-07-14 DIAGNOSIS — I35.0 AORTIC VALVE STENOSIS, ETIOLOGY OF CARDIAC VALVE DISEASE UNSPECIFIED: ICD-10-CM

## 2025-07-14 DIAGNOSIS — M15.0 PRIMARY OSTEOARTHRITIS INVOLVING MULTIPLE JOINTS: ICD-10-CM

## 2025-07-14 DIAGNOSIS — I48.20 CHRONIC ATRIAL FIBRILLATION (MULTI): ICD-10-CM

## 2025-07-14 DIAGNOSIS — I50.20 SYSTOLIC CONGESTIVE HEART FAILURE, UNSPECIFIED HF CHRONICITY: ICD-10-CM

## 2025-07-14 DIAGNOSIS — E61.1 LOW IRON: ICD-10-CM

## 2025-07-14 DIAGNOSIS — D64.9 ANEMIA, UNSPECIFIED TYPE: ICD-10-CM

## 2025-07-14 DIAGNOSIS — R73.03 PREDIABETES: ICD-10-CM

## 2025-07-14 DIAGNOSIS — E78.00 HYPERCHOLESTEROLEMIA: ICD-10-CM

## 2025-07-14 DIAGNOSIS — N18.31 STAGE 3A CHRONIC KIDNEY DISEASE (MULTI): ICD-10-CM

## 2025-07-14 PROCEDURE — 3075F SYST BP GE 130 - 139MM HG: CPT | Performed by: INTERNAL MEDICINE

## 2025-07-14 PROCEDURE — 99214 OFFICE O/P EST MOD 30 MIN: CPT | Performed by: INTERNAL MEDICINE

## 2025-07-14 PROCEDURE — 3078F DIAST BP <80 MM HG: CPT | Performed by: INTERNAL MEDICINE

## 2025-07-14 PROCEDURE — G2211 COMPLEX E/M VISIT ADD ON: HCPCS | Performed by: INTERNAL MEDICINE

## 2025-07-14 PROCEDURE — 1160F RVW MEDS BY RX/DR IN RCRD: CPT | Performed by: INTERNAL MEDICINE

## 2025-07-14 PROCEDURE — 1159F MED LIST DOCD IN RCRD: CPT | Performed by: INTERNAL MEDICINE

## 2025-07-14 PROCEDURE — 1125F AMNT PAIN NOTED PAIN PRSNT: CPT | Performed by: INTERNAL MEDICINE

## 2025-07-14 RX ORDER — FERROUS SULFATE 325(65) MG
1 TABLET ORAL EVERY OTHER DAY
Status: SHIPPED
Start: 2025-07-14 | End: 2026-07-14

## 2025-07-14 RX ORDER — FUROSEMIDE 40 MG/1
20 TABLET ORAL DAILY
Status: SHIPPED
Start: 2025-07-14

## 2025-07-14 ASSESSMENT — ENCOUNTER SYMPTOMS
GASTROINTESTINAL NEGATIVE: 1
OCCASIONAL FEELINGS OF UNSTEADINESS: 1
DEPRESSION: 0
CARDIOVASCULAR NEGATIVE: 1
RESPIRATORY NEGATIVE: 1
LOSS OF SENSATION IN FEET: 0

## 2025-07-14 ASSESSMENT — PATIENT HEALTH QUESTIONNAIRE - PHQ9
2. FEELING DOWN, DEPRESSED OR HOPELESS: NOT AT ALL
1. LITTLE INTEREST OR PLEASURE IN DOING THINGS: NOT AT ALL
SUM OF ALL RESPONSES TO PHQ9 QUESTIONS 1 AND 2: 0

## 2025-07-14 ASSESSMENT — PAIN SCALES - GENERAL: PAINLEVEL_OUTOF10: 8

## 2025-07-14 NOTE — PATIENT INSTRUCTIONS
It was nice to see you today.  It looks like you are doing pretty well right now.  We are going to make the following adjustments:  1.  You can reduce your iron to every other day  2.  You may reduce your furosemide from 40 mg daily to 20 mg daily.  3.  Please do a blood test 1 of these days  4.  I will plan on seeing you back in December-I look forward to seeing how you do at your Dr. Rehman appointment as well.

## 2025-07-14 NOTE — PROGRESS NOTES
[facility]: [facility]  PROGRESS NOTE      Jose Martin Fleming is a 89 y.o. male that is presenting today for Follow-up.    Assessment/Plan   Diagnoses and all orders for this visit:  Hyperglycemia  -     Hemoglobin A1C; Future  Prediabetes  Chronic atrial fibrillation (Multi)  Primary osteoarthritis involving multiple joints  Aortic valve stenosis, etiology of cardiac valve disease unspecified  Primary hypertension  Hypercholesterolemia  -     Lipid Panel; Future  -     TSH with reflex to Free T4 if abnormal; Future  Systolic congestive heart failure, unspecified HF chronicity  -     Comprehensive Metabolic Panel; Future  Anemia, unspecified type  -     CBC and Auto Differential; Future  -     Iron and TIBC; Future  -     Ferritin; Future  -     Vitamin B12; Future  -     Folate; Future  Vitamin B12 deficiency  Stage 3a chronic kidney disease (Multi)  Low iron  -     ferrous sulfate 325 mg (65 mg elemental) tablet; Take 1 tablet by mouth every other day.  Other orders  -     Follow Up In Primary Care - Established  -     furosemide (Lasix) 40 mg tablet; Take 0.5 tablets (20 mg) by mouth once daily.  -     Follow Up In Primary Care - Medicare Annual; Future  We considered his current situation.    1.  History of chronic atrial fibrillation patient is tolerating his medications well he is going to Coumadin clinic he will need some basic labs  2.  Hypertension-stable on current program  3.  Hyperlipidemia-stable on current program labs are ordered  4.  History of systolic heart failure-patient is on all the right GDMT-we will check his labs.  He has an appointment coming up with Dr. Rehman in August.  Patient would like not to take as much Lasix as he does he is going to try reducing his furosemide to 20 mg daily to see how he does with that.  5.  History of anemia-we will recheck his values but he probably can reduce his iron to every other day.  Subjective   This 89-year-old is here for his  follow-up visit.  I actually have not seen him since he returned from [place].  He had a MI while in [place] with some adjustments to medications made at that point in time.  When he returned home he was taking home oxygen which she still continues to use at night and sometimes occasionally with ambulation.    For the most part he continues to feel very well he is not complaining of any chest pain shortness of breath or leg swelling.  His energy level seems to be good      Review of Systems   Respiratory: Negative.     Cardiovascular: Negative.    Gastrointestinal: Negative.    Genitourinary: Negative.       Objective   Vitals:    07/14/25 1413   BP: 130/70   Pulse: 61   Temp: 36.6 °C (97.8 °F)   SpO2: 94%      Body mass index is 23.87 kg/m².  Physical Exam  Cardiovascular:      Rate and Rhythm: Normal rate and regular rhythm.      Pulses: Normal pulses.      Heart sounds: Normal heart sounds.   Pulmonary:      Effort: Pulmonary effort is normal.      Breath sounds: Normal breath sounds.   Abdominal:      General: Abdomen is flat. Bowel sounds are normal.      Palpations: Abdomen is soft.   Musculoskeletal:         General: Normal range of motion.   Skin:     General: Skin is warm and dry.       Diagnostic Results   Lab Results   Component Value Date    GLUCOSE 83 11/15/2024    CALCIUM 9.0 11/15/2024     11/15/2024    K 4.4 11/15/2024    CO2 29 11/15/2024     (H) 11/15/2024    BUN 28 (H) 11/15/2024    CREATININE 1.22 11/15/2024     Lab Results   Component Value Date    ALT 19 11/15/2024    AST 22 11/15/2024    ALKPHOS 81 11/15/2024    BILITOT 0.3 11/15/2024     Lab Results   Component Value Date    WBC 7.8 11/15/2024    HGB 11.2 (L) 11/15/2024    HCT 37.6 (L) 11/15/2024    MCV 80 11/15/2024     11/15/2024     Lab Results   Component Value Date    CHOL 204 (H) 11/15/2024    CHOL 188 11/20/2023    CHOL 188 12/01/2022     Lab Results   Component Value Date    HDL 63.5 11/15/2024    HDL 52.0  "11/20/2023    HDL 44 12/01/2022     Lab Results   Component Value Date    LDLCALC 122 (H) 11/15/2024    LDLCALC 114 11/20/2023    LDLCALC 123 12/01/2022     Lab Results   Component Value Date    TRIG 93 11/15/2024    TRIG 110 11/20/2023    TRIG 103 12/01/2022     No components found for: \"CHOLHDL\"  Lab Results   Component Value Date    HGBA1C 5.4 01/30/2025     Other labs not included in the list above were reviewed either before or during this encounter.    History    Medical History[1]  Surgical History[2]  Family History[3]  Social History     Socioeconomic History    Marital status:      Spouse name: Not on file    Number of children: Not on file    Years of education: Not on file    Highest education level: Not on file   Occupational History    Not on file   Tobacco Use    Smoking status: Former     Current packs/day: 1.00     Average packs/day: 1 pack/day for 53.0 years (53.0 ttl pk-yrs)     Types: Cigarettes    Smokeless tobacco: Never   Vaping Use    Vaping status: Never Used   Substance and Sexual Activity    Alcohol use: Not Currently    Drug use: Never    Sexual activity: Not on file   Other Topics Concern    Not on file   Social History Narrative    Not on file     Social Drivers of Health     Financial Resource Strain: Low Risk  (1/31/2025)    Received from [employer] Needs Screening - Medical Financial Resource Strain     Would you like help with any of the following needs? (Select ALL that apply): I don't want help with any of these   Food Insecurity: No Food Insecurity (1/31/2025)    Received from [employer] Social Needs Screening - Food Insecurity     Would you like help with any of the following needs? (Select ALL that apply): I don't want help with any of these   Transportation Needs: No Transportation Needs (1/31/2025)    Received from [employer] Needs Screening - Transportation     Would you like help with any of the following " needs? (Select ALL that apply): I don't want help with any of these   Physical Activity: Sufficiently Active (12/27/2023)    Exercise Vital Sign     Days of Exercise per Week: 7 days     Minutes of Exercise per Session: 30 min   Stress: No Stress Concern Present (12/27/2023)    [employer] [facility] [employer] [facility] - Occupational Stress Questionnaire     Feeling of Stress : Not at all   Social Connections: Socially Integrated (1/31/2025)    Received from [facility] Social Needs Screening - Social Connection     Would you like help with any of the following needs? (Select ALL that apply): I don't want help with any of these   Intimate Partner Violence: Not At Risk (1/30/2025)    Received from [facility] ED Domestic Violence     Do you feel threatened or afraid of others close to you?: No   Housing Stability: Low Risk  (1/31/2025)    Received from [facility] Social Needs Screening - Housing     Would you like help with any of the following needs? (Select ALL that apply): I don't want help with any of these     Allergies[4]  Medications Ordered Prior to Encounter[5]  Immunization History   Administered Date(s) Administered    COVID-19, mRNA, LNP-S, PF, 30 mcg/0.3 mL dose 10/25/2021    COVID-19, subunit, rS-nanoparticle, adjuvanted, PF, 5 mcg/0.5 mL 11/22/2024    Flu vaccine, quadrivalent, high-dose, preservative free, age 65y+ (FLUZONE) 09/24/2020, 12/01/2021, 12/02/2022, 12/08/2023    Flu vaccine, trivalent, preservative free, HIGH-DOSE, age 65y+ (Fluzone) 09/10/2013, 11/11/2016, 10/04/2017, 11/19/2018, 11/20/2019, 12/13/2024    Influenza, Unspecified 12/02/2022    Influenza, seasonal, injectable 10/15/2014, 10/08/2015, 09/19/2018, 12/13/2024    Moderna SARS-CoV-2 Booster 09/20/2023    Moderna SARS-CoV-2 Vaccination 01/20/2021, 02/17/2021    Pfizer COVID-19 vaccine, 12 years and older, (30mcg/0.3mL) (Comirnaty) 11/19/2023, 07/19/2024    Pfizer COVID-19 vaccine, bivalent,  age 12 years and older (30 mcg/0.3 mL) 09/09/2022    Pfizer Gray Cap SARS-CoV-2 04/06/2022    Pneumococcal conjugate vaccine, 13-valent (PREVNAR 13) 11/27/2015    Pneumococcal conjugate vaccine, 20-valent (PREVNAR 20) 12/02/2022    Pneumococcal polysaccharide vaccine, 23-valent, age 2 years and older (PNEUMOVAX 23) 06/18/2009, 10/15/2014    RSV, 60 Years And Older (AREXVY) 12/15/2024    Td vaccine, age 7 years and older (TDVAX) 06/16/2008    Tdap vaccine, age 7 year and older (BOOSTRIX, ADACEL) 11/19/2018    Zoster vaccine, recombinant, adult (SHINGRIX) 06/05/2019, 08/14/2019    Zoster, live 06/24/2009     Patient's medical history was reviewed and updated either before or during this encounter.       Ryan Gonzalez MD       [1]   Past Medical History:  Diagnosis Date    Anemia 09/02/2023    Aortic stenosis 09/02/2023    Aortic valve disorder 09/02/2023    At risk for bleeding 09/02/2023    Atrial fibrillation (Multi) 09/02/2023    Cerebrovascular accident (CVA) (Multi) 09/02/2023    History of aortic valve replacement 09/02/2023    Hypercholesterolemia 09/02/2023    Hypertension 09/02/2023    Long term (current) use of anticoagulants 09/25/2023    Personal history of other diseases of the circulatory system 07/18/2014    History of aortic valve disorder    Personal history of other diseases of the nervous system and sense organs     History of cataract    Prediabetes 09/02/2023    Preglaucoma, unspecified, unspecified eye     Glaucoma suspect   [2]   Past Surgical History:  Procedure Laterality Date    ANKLE FRACTURE SURGERY  1989    APPENDECTOMY  10/08/2013    Appendectomy    COLONOSCOPY  2010    IR EMBOLIZATION  09/09/2021    IR EMBOLIZATION [facility]    MECHANICAL AORTIC VALVE REPLACEMENT  1985    [facility]    TOTAL HIP ARTHROPLASTY Left 1999    TOTAL HIP ARTHROPLASTY Right 2005   [3]   Family History  Problem Relation Name Age of Onset    Heart attack Father      Sudden death Father           Cardiac    Diabetes Brother     [4] No Known Allergies  [5]   Current Outpatient Medications on File Prior to Visit   Medication Sig Dispense Refill    amiodarone (Pacerone) 100 mg tablet Take 1 tablet (100 mg) by mouth once daily. 90 tablet 3    b complex 0.4 mg tablet Take 1 tablet by mouth once daily.      clopidogrel (Plavix) 75 mg tablet Take 1 tablet (75 mg) by mouth once daily. 90 tablet 3    empagliflozin (Jardiance) 10 mg tablet Take 1 tablet (10 mg) by mouth once daily. 90 tablet 3    ezetimibe (Zetia) 10 mg tablet Take 1 tablet (10 mg) by mouth once daily. 90 tablet 3    metoprolol succinate XL (Toprol-XL) 25 mg 24 hr tablet Take 1 tablet (25 mg) by mouth once daily. Do not crush or chew. 90 tablet 3    rosuvastatin (Crestor) 20 mg tablet Take 1 tablet (20 mg) by mouth once daily. 30 tablet 11    sacubitriL-valsartan (Entresto) 24-26 mg tablet Take 1 tablet by mouth 2 times a day. 60 tablet 2    warfarin (Coumadin) 2 mg tablet Take 1 tablet (2 mg) by mouth see administration instructions. Please check INR on 9/23 in the coumadin clinic. 90 tablet 1    ZINC ACETATE ORAL Take 1 tablet by mouth once daily.      [DISCONTINUED] ferrous sulfate 325 (65 Fe) mg tablet Take 1 tablet (325 mg) by mouth once daily with breakfast. 90 tablet 3    [DISCONTINUED] furosemide (Lasix) 40 mg tablet Take 1 tablet (40 mg) by mouth once daily.       No current facility-administered medications on file prior to visit.

## 2025-07-16 ENCOUNTER — ANTICOAGULATION - WARFARIN VISIT (OUTPATIENT)
Dept: CARDIOLOGY | Facility: CLINIC | Age: OVER 89
End: 2025-07-16
Payer: MEDICARE

## 2025-07-16 DIAGNOSIS — Z95.2 HISTORY OF AORTIC VALVE REPLACEMENT: Primary | ICD-10-CM

## 2025-07-16 DIAGNOSIS — I48.21 PERMANENT ATRIAL FIBRILLATION (MULTI): ICD-10-CM

## 2025-07-16 LAB
INR IN PPP BY COAGULATION ASSAY EXTERNAL: 4.1
PROTHROMBIN TIME (PT) IN PPP BY COAGULATION ASSAY EXTERNAL: NORMAL

## 2025-07-16 NOTE — PROGRESS NOTES
Patient identification verified with 2 identifiers.    Location: [facility] - [address] [place], [place] [address] [phone]     Referring Physician: Dr. Anirudh Rehman  Enrollment/ Re-enrollment date: 7/7/2026   INR Goal: 2.5-3.5  INR monitoring is per [facility] protocol.  Anticoagulation Medication: warfarin  Indication: Aortic Valve Replacement    Subjective   Bleeding signs/symptoms: No    Bruising: No   Major bleeding event: No  Thrombosis signs/symptoms: No  Thromboembolic event: No  Missed doses: No  Extra doses: No  Medication changes: No  Dietary changes: No  Change in health: No  Change in activity: No  Alcohol: No  Other concerns: No    Upcoming Procedures:  Does the Patient Have any upcoming procedures that require interruption in anticoagulation therapy? no  Does the patient require bridging? no      Anticoagulation Summary  As of 7/16/2025      INR goal:  2.5-3.5   TTR:  31.8% (1.7 y)   INR used for dosing:  --               Assessment/Plan   Supratherapeutic     1. New dose: Hold Warfarin 7/16.  Reduce TWD.     2. Next INR: 1 week      Education provided to patient during the visit:  Patient instructed to call in interim with questions, concerns and changes.   Patient educated on interactions between medications and warfarin.   Patient educated on dietary consistency in vitamin k consumption.   Patient educated on affects of alcohol consumption while taking warfarin.   Patient educated on signs of bleeding/clotting.   Patient educated on compliance with dosing, follow up appointments, and prescribed plan of care.

## 2025-07-17 LAB
ALBUMIN SERPL-MCNC: 4.1 G/DL (ref 3.6–5.1)
ALP SERPL-CCNC: 99 U/L (ref 35–144)
ALT SERPL-CCNC: 13 U/L (ref 9–46)
ANION GAP SERPL CALCULATED.4IONS-SCNC: 9 MMOL/L (CALC) (ref 7–17)
AST SERPL-CCNC: 18 U/L (ref 10–35)
BASOPHILS # BLD AUTO: 49 CELLS/UL (ref 0–200)
BASOPHILS NFR BLD AUTO: 0.6 %
BILIRUB SERPL-MCNC: 0.8 MG/DL (ref 0.2–1.2)
BUN SERPL-MCNC: 15 MG/DL (ref 7–25)
CALCIUM SERPL-MCNC: 9.1 MG/DL (ref 8.6–10.3)
CHLORIDE SERPL-SCNC: 106 MMOL/L (ref 98–110)
CHOLEST SERPL-MCNC: 211 MG/DL
CHOLEST/HDLC SERPL: 3.3 (CALC)
CO2 SERPL-SCNC: 24 MMOL/L (ref 20–32)
CREAT SERPL-MCNC: 1.2 MG/DL (ref 0.7–1.22)
EGFRCR SERPLBLD CKD-EPI 2021: 57 ML/MIN/1.73M2
EOSINOPHIL # BLD AUTO: 980 CELLS/UL (ref 15–500)
EOSINOPHIL NFR BLD AUTO: 12.1 %
ERYTHROCYTE [DISTWIDTH] IN BLOOD BY AUTOMATED COUNT: 15.2 % (ref 11–15)
EST. AVERAGE GLUCOSE BLD GHB EST-MCNC: 108 MG/DL
EST. AVERAGE GLUCOSE BLD GHB EST-SCNC: 6 MMOL/L
FERRITIN SERPL-MCNC: 64 NG/ML (ref 24–380)
FOLATE SERPL-MCNC: 13.4 NG/ML
GLUCOSE SERPL-MCNC: 84 MG/DL (ref 65–99)
HBA1C MFR BLD: 5.4 %
HCT VFR BLD AUTO: 43.9 % (ref 38.5–50)
HDLC SERPL-MCNC: 64 MG/DL
HGB BLD-MCNC: 14.4 G/DL (ref 13.2–17.1)
IRON SATN MFR SERPL: 25 % (CALC) (ref 20–48)
IRON SERPL-MCNC: 73 MCG/DL (ref 50–180)
LDLC SERPL CALC-MCNC: 130 MG/DL (CALC)
LYMPHOCYTES # BLD AUTO: 907 CELLS/UL (ref 850–3900)
LYMPHOCYTES NFR BLD AUTO: 11.2 %
MCH RBC QN AUTO: 30.6 PG (ref 27–33)
MCHC RBC AUTO-ENTMCNC: 32.8 G/DL (ref 32–36)
MCV RBC AUTO: 93.4 FL (ref 80–100)
MONOCYTES # BLD AUTO: 996 CELLS/UL (ref 200–950)
MONOCYTES NFR BLD AUTO: 12.3 %
NEUTROPHILS # BLD AUTO: 5168 CELLS/UL (ref 1500–7800)
NEUTROPHILS NFR BLD AUTO: 63.8 %
NONHDLC SERPL-MCNC: 147 MG/DL (CALC)
PLATELET # BLD AUTO: 313 THOUSAND/UL (ref 140–400)
PMV BLD REES-ECKER: 10.7 FL (ref 7.5–12.5)
POTASSIUM SERPL-SCNC: 4.6 MMOL/L (ref 3.5–5.3)
PROT SERPL-MCNC: 7.1 G/DL (ref 6.1–8.1)
RBC # BLD AUTO: 4.7 MILLION/UL (ref 4.2–5.8)
SODIUM SERPL-SCNC: 139 MMOL/L (ref 135–146)
TIBC SERPL-MCNC: 292 MCG/DL (CALC) (ref 250–425)
TRIGL SERPL-MCNC: 76 MG/DL
TSH SERPL-ACNC: 3.07 MIU/L (ref 0.4–4.5)
VIT B12 SERPL-MCNC: 504 PG/ML (ref 200–1100)
WBC # BLD AUTO: 8.1 THOUSAND/UL (ref 3.8–10.8)

## 2025-07-22 LAB
INR IN PPP BY COAGULATION ASSAY EXTERNAL: 4.5
PROTHROMBIN TIME (PT) IN PPP BY COAGULATION ASSAY EXTERNAL: NORMAL

## 2025-07-23 ENCOUNTER — ANTICOAGULATION - WARFARIN VISIT (OUTPATIENT)
Dept: CARDIOLOGY | Facility: CLINIC | Age: OVER 89
End: 2025-07-23
Payer: MEDICARE

## 2025-07-23 DIAGNOSIS — Z95.2 HISTORY OF AORTIC VALVE REPLACEMENT: Primary | ICD-10-CM

## 2025-07-23 DIAGNOSIS — I48.21 PERMANENT ATRIAL FIBRILLATION (MULTI): ICD-10-CM

## 2025-07-23 NOTE — PROGRESS NOTES
Patient identification verified with 2 identifiers.    Location: [facility] - [address] [place], [place] [address] [phone]     Referring Physician: Dr. Anirudh Rehman  Enrollment/ Re-enrollment date: 2026   INR Goal: 2.5-3.5  INR monitoring is per [facility] protocol.  Anticoagulation Medication: warfarin  Indication: Aortic Valve Replacement    Subjective   Bleeding signs/symptoms: No    Bruising: No   Major bleeding event: No  Thrombosis signs/symptoms: No  Thromboembolic event: No  Missed doses: No  Extra doses: No  Medication changes: Yes  Per note, Lasix was decreased from 40 to20mg daily by Dr. Gonzalez  Dietary changes: No  Change in health: No  Change in activity: No  Alcohol: No  Other concerns: No    Upcoming Procedures:  Does the Patient Have any upcoming procedures that require interruption in anticoagulation therapy? no  Does the patient require bridging? no      Anticoagulation Summary  As of 2025      INR goal:  2.5-3.5   TTR:  31.7% (1.8 y)   INR used for dosin.50 (2025)   Weekly warfarin total:  25 mg               Assessment/Plan   Supratherapeutic     1. New dose: Hold Warfarin X 2 days, decrease TWD.     2. Next INR: 1 week      Education provided to patient during the visit:  Patient instructed to call in interim with questions, concerns and changes.   Patient educated on interactions between medications and warfarin.   Patient educated on dietary consistency in vitamin k consumption.   Patient educated on affects of alcohol consumption while taking warfarin.   Patient educated on signs of bleeding/clotting.   Patient educated on compliance with dosing, follow up appointments, and prescribed plan of care.

## 2025-07-30 ENCOUNTER — ANTICOAGULATION - WARFARIN VISIT (OUTPATIENT)
Dept: CARDIOLOGY | Facility: CLINIC | Age: OVER 89
End: 2025-07-30
Payer: MEDICARE

## 2025-07-30 DIAGNOSIS — I48.21 PERMANENT ATRIAL FIBRILLATION (MULTI): ICD-10-CM

## 2025-07-30 DIAGNOSIS — Z95.2 HISTORY OF AORTIC VALVE REPLACEMENT: Primary | ICD-10-CM

## 2025-07-30 LAB
INR IN PPP BY COAGULATION ASSAY EXTERNAL: 3.4
PROTHROMBIN TIME (PT) IN PPP BY COAGULATION ASSAY EXTERNAL: NORMAL

## 2025-07-30 NOTE — PROGRESS NOTES
Patient identification verified with 2 identifiers.    Location: [facility] - [address] [place], [place] [address] [phone]     Referring Physician: Dr. Anirudh Rehman  Enrollment/ Re-enrollment date: 7/7/2026   INR Goal: 2.5-3.5  INR monitoring is per [facility] protocol.  Anticoagulation Medication: warfarin  Indication: Aortic Valve Replacement    Subjective   Bleeding signs/symptoms: No    Bruising: No   Major bleeding event: No  Thrombosis signs/symptoms: No  Thromboembolic event: No  Missed doses: No  Extra doses: No  Medication changes: No  Dietary changes: No  Change in health: No  Change in activity: No  Alcohol: No  Other concerns: No    Upcoming Procedures:  Does the Patient Have any upcoming procedures that require interruption in anticoagulation therapy? no  Does the patient require bridging? no      Anticoagulation Summary  As of 7/30/2025      INR goal:  2.5-3.5   TTR:  31.5% (1.8 y)   INR used for dosing:  3.40 (7/29/2025)   Weekly warfarin total:  25 mg               Assessment/Plan   Therapeutic     1. New dose: no change    2. Next INR: 1 week      Education provided to patient during the visit:  Patient instructed to call in interim with questions, concerns and changes.   Patient educated on interactions between medications and warfarin.   Patient educated on dietary consistency in vitamin k consumption.   Patient educated on affects of alcohol consumption while taking warfarin.   Patient educated on signs of bleeding/clotting.   Patient educated on compliance with dosing, follow up appointments, and prescribed plan of care.

## 2025-08-02 ENCOUNTER — HOSPITAL ENCOUNTER (EMERGENCY)
Facility: HOSPITAL | Age: OVER 89
Discharge: HOME | End: 2025-08-02
Attending: EMERGENCY MEDICINE
Payer: MEDICARE

## 2025-08-02 VITALS
DIASTOLIC BLOOD PRESSURE: 69 MMHG | TEMPERATURE: 97.9 F | HEIGHT: 69 IN | RESPIRATION RATE: 16 BRPM | BODY MASS INDEX: 22.22 KG/M2 | SYSTOLIC BLOOD PRESSURE: 111 MMHG | OXYGEN SATURATION: 92 % | HEART RATE: 98 BPM | WEIGHT: 150 LBS

## 2025-08-02 DIAGNOSIS — K91.840 SURGICAL WOUND HEMORRHAGE AFTER DENTAL PROCEDURE: Primary | ICD-10-CM

## 2025-08-02 DIAGNOSIS — Z98.818 SURGICAL WOUND HEMORRHAGE AFTER DENTAL PROCEDURE: Primary | ICD-10-CM

## 2025-08-02 PROCEDURE — 99283 EMERGENCY DEPT VISIT LOW MDM: CPT | Performed by: EMERGENCY MEDICINE

## 2025-08-02 RX ORDER — LIDOCAINE HYDROCHLORIDE AND EPINEPHRINE BITARTRATE 20; .01 MG/ML; MG/ML
1.7 INJECTION, SOLUTION SUBCUTANEOUS ONCE
Status: DISCONTINUED | OUTPATIENT
Start: 2025-08-02 | End: 2025-08-02

## 2025-08-02 ASSESSMENT — PAIN SCALES - GENERAL: PAINLEVEL_OUTOF10: 0 - NO PAIN

## 2025-08-02 ASSESSMENT — PAIN - FUNCTIONAL ASSESSMENT: PAIN_FUNCTIONAL_ASSESSMENT: 0-10

## 2025-08-03 NOTE — ED PROVIDER NOTES
HPI   Chief Complaint   Patient presents with    Dental Problem     BIB EMS with dental bleeding from left side crown that broke yesterday. Bleeding ongoing for 24 hours, gradually slowing. On coumadin, but did not take today d/t bleeding.        HPI  89 year-old male brought in for dental bleeding.  The patient is on blood thinners he had some dental work done yesterday and he has had some bleeding in his mouth.  He actually states is gotten a lot better but staff at the facility wanted him to come to the ER.  No other complaints.      Patient History   Medical History[1]  Surgical History[2]  Family History[3]  Social History[4]    Physical Exam   ED Triage Vitals [08/02/25 2146]   Temperature Heart Rate Respirations BP   36.6 °C (97.9 °F) 98 16 111/69      Pulse Ox Temp Source Heart Rate Source Patient Position   (!) 92 % Temporal -- --      BP Location FiO2 (%)     -- --       Physical Exam  General:  Awake, alert, no acute distress.  Head: Normocephalic, Atraumatic  Mouth: Dried blood right lower mouth with no active bleeding  Neck: Supple, trachea midline, no stridor  Skin: Warm and dry, no rashes   Lungs:  No acute respiratory distress, speaking in full sentences without difficulty  Neuro:  No gross focal neurologic deficits, NIH is 0  Musculoskeletal:  Full range of motion in all 4 extremities  Psychiatric:  Alert oriented x 3, Good insight into condition.    ED Course & MDM   Diagnoses as of 08/02/25 2339   Surgical wound hemorrhage after dental procedure                 No data recorded     Becky Coma Scale Score: 15 (08/02/25 2150 : Rayna Arias RN)                           Medical Decision Making  Patient's bleeding has resolved.  He was given a cottonball soaked with lidocaine with epinephrine to place on the site.  Follow-up with his dentist.  Stable for discharge.    Procedure  Procedures       [1]   Past Medical History:  Diagnosis Date    Anemia 09/02/2023    Aortic stenosis 09/02/2023    Aortic  valve disorder 09/02/2023    At risk for bleeding 09/02/2023    Atrial fibrillation (Multi) 09/02/2023    Cerebrovascular accident (CVA) (Multi) 09/02/2023    History of aortic valve replacement 09/02/2023    Hypercholesterolemia 09/02/2023    Hypertension 09/02/2023    Long term (current) use of anticoagulants 09/25/2023    Personal history of other diseases of the circulatory system 07/18/2014    History of aortic valve disorder    Personal history of other diseases of the nervous system and sense organs     History of cataract    Prediabetes 09/02/2023    Preglaucoma, unspecified, unspecified eye     Glaucoma suspect   [2]   Past Surgical History:  Procedure Laterality Date    ANKLE FRACTURE SURGERY  1989    APPENDECTOMY  10/08/2013    Appendectomy    COLONOSCOPY  2010    IR EMBOLIZATION  09/09/2021    IR EMBOLIZATION [facility]    MECHANICAL AORTIC VALVE REPLACEMENT  1985    [facility]    TOTAL HIP ARTHROPLASTY Left 1999    TOTAL HIP ARTHROPLASTY Right 2005   [3]   Family History  Problem Relation Name Age of Onset    Heart attack Father      Sudden death Father          Cardiac    Diabetes Brother     [4]   Social History  Tobacco Use    Smoking status: Former     Current packs/day: 1.00     Average packs/day: 1 pack/day for 53.0 years (53.0 ttl pk-yrs)     Types: Cigarettes    Smokeless tobacco: Never   Vaping Use    Vaping status: Never Used   Substance Use Topics    Alcohol use: Not Currently    Drug use: Never        Edd Olvera DO  08/02/25 4742

## 2025-08-03 NOTE — DISCHARGE INSTRUCTIONS
Thank you for allowing us to care for you today.  You may receive a survey regarding your visit today.    If you were satisfied with the care and service provided we would be very grateful if you would give us a high rating.    Your feedback is very important to us.    Dr. Olvera        As of today's visit, based on reasonable likelihood, that it is safe for you to be discharged back to your residence to follow-up as an outpatient for ongoing management of your medical problem. You should follow-up with any referrals / primary provider as soon as possible. The contacts (number, addresses) are listed below.         Important:  Even though we think it is safe for you to go home, there is always a small chance that we are missing something that could require hospitalization.  Therefore it is very important that if you get worse or develops any new symptoms that you return here as soon as possible to be re-evaluated.  This includes return of symptoms that have resolved such as fainting, chest pain, or symptoms that could be warning signs for stroke important:  Even though we think it is safe for you to go home, there is always a small chance that we are missing something that could require hospitalization.  Therefore it is very important that if you get worse or develops any new symptoms that you return here as soon as possible to be re-evaluated.  This includes return of symptoms that have resolved such as fainting, chest pain, or symptoms that could be warning signs for stroke         Make sure your pharmacy and primary doctor is aware of any new medications prescribed today.          It is your responsibility to contact as soon as possible, and follow through with, any referrals you were given today. We do recommend you inform them you are a [facility] follow-up patient, as often they can better accommodate your need to be seen, provided their schedules allow. We will, and have, made every effort to ensure you have  access to adequate follow-up specialists available.          All problems may not be able to be fixed in one ER visit. This is why timely ongoing care is important, and this is a responsibility you share in. Further, you are free to follow up with any provider you choose, and this is not limited to our suggestion.          If cultures were obtained today, you will be contacted should anything result that would require further treatment. Please contact the ED at the number provided with questions.          Having trouble affording medications? Try [email or web address]! (This is not a hospital endorsed website, merely a recommendation based on my own personal experiences with [facility])

## 2025-08-04 ENCOUNTER — DOCUMENTATION (OUTPATIENT)
Dept: CARDIOLOGY | Facility: CLINIC | Age: OVER 89
End: 2025-08-04
Payer: MEDICARE

## 2025-08-04 NOTE — PROGRESS NOTES
Daughter Karen left VM at the [facility] stating her father fell over the weekend and was bleeding profusely from his mouth.  He was taken to ED and evaluation.  Karen states pts Warfarin was held by [facility] due to excessive bleeding. No INR noted at ED visit.  Pt will have INR drawn at [facility] on 8/5/25.

## 2025-08-08 ENCOUNTER — ANTICOAGULATION - WARFARIN VISIT (OUTPATIENT)
Dept: CARDIOLOGY | Facility: CLINIC | Age: OVER 89
End: 2025-08-08
Payer: MEDICARE

## 2025-08-08 DIAGNOSIS — I48.21 PERMANENT ATRIAL FIBRILLATION (MULTI): ICD-10-CM

## 2025-08-08 DIAGNOSIS — Z95.2 HISTORY OF AORTIC VALVE REPLACEMENT: Primary | ICD-10-CM

## 2025-08-08 NOTE — PROGRESS NOTES
Patient identification verified with 2 identifiers.    Location: [facility] - [address] [place], [place] [address] [phone]     Referring Physician: Dr. Anirudh Rehman  Enrollment/ Re-enrollment date: 2026   INR Goal: 2.5-3.5  INR monitoring is per [facility] protocol.  Anticoagulation Medication: warfarin  Indication: Aortic Valve Replacement    Subjective   Bleeding signs/symptoms: No    Bruising: No   Major bleeding event: No  Thrombosis signs/symptoms: No  Thromboembolic event: No  Missed doses: No  Extra doses: No  Medication changes: No  Dietary changes: No  Change in health: No  Change in activity: No  Alcohol: No  Other concerns: No    Upcoming Procedures:  Does the Patient Have any upcoming procedures that require interruption in anticoagulation therapy? no  Does the patient require bridging? no      Anticoagulation Summary  As of 2025      INR goal:  2.5-3.5   TTR:  31.3% (1.8 y)   INR used for dosin.10 (2025)   Weekly warfarin total:  25 mg               Assessment/Plan   Supratherapeutic     1. New dose: Reduce TWD.     2. Next INR: 4 days      Education provided to patient during the visit:  Patient instructed to call in interim with questions, concerns and changes.   Patient educated on interactions between medications and warfarin.   Patient educated on dietary consistency in vitamin k consumption.   Patient educated on affects of alcohol consumption while taking warfarin.   Patient educated on signs of bleeding/clotting.   Patient educated on compliance with dosing, follow up appointments, and prescribed plan of care.

## 2025-08-11 PROBLEM — I50.22 CHRONIC HFREF (HEART FAILURE WITH REDUCED EJECTION FRACTION): Status: RESOLVED | Noted: 2025-01-31 | Resolved: 2025-08-11

## 2025-08-11 PROBLEM — D68.59 HYPERCOAGULABLE STATE (MULTI): Status: RESOLVED | Noted: 2025-01-31 | Resolved: 2025-08-11

## 2025-08-11 PROBLEM — I63.239: Status: RESOLVED | Noted: 2025-08-11 | Resolved: 2025-08-11

## 2025-08-11 PROBLEM — I49.3 PREMATURE VENTRICULAR CONTRACTION: Status: RESOLVED | Noted: 2025-01-31 | Resolved: 2025-08-11

## 2025-08-11 PROBLEM — I50.9 ACUTE ON CHRONIC CONGESTIVE HEART FAILURE: Status: RESOLVED | Noted: 2025-01-30 | Resolved: 2025-08-11

## 2025-08-11 PROBLEM — I49.1 PREMATURE ATRIAL CONTRACTIONS: Status: RESOLVED | Noted: 2025-01-31 | Resolved: 2025-08-11

## 2025-08-11 PROBLEM — I21.4 NON-STEMI (NON-ST ELEVATED MYOCARDIAL INFARCTION) (MULTI): Status: RESOLVED | Noted: 2025-01-30 | Resolved: 2025-08-11

## 2025-08-11 PROBLEM — I25.5 ISCHEMIC CARDIOMYOPATHY: Status: RESOLVED | Noted: 2025-01-31 | Resolved: 2025-08-11

## 2025-08-13 ENCOUNTER — ANTICOAGULATION - WARFARIN VISIT (OUTPATIENT)
Dept: CARDIOLOGY | Facility: CLINIC | Age: OVER 89
End: 2025-08-13

## 2025-08-13 ENCOUNTER — OFFICE VISIT (OUTPATIENT)
Dept: PRIMARY CARE | Facility: CLINIC | Age: OVER 89
End: 2025-08-13
Payer: MEDICARE

## 2025-08-13 VITALS
OXYGEN SATURATION: 95 % | SYSTOLIC BLOOD PRESSURE: 114 MMHG | TEMPERATURE: 97.7 F | BODY MASS INDEX: 22.81 KG/M2 | WEIGHT: 154 LBS | HEART RATE: 61 BPM | DIASTOLIC BLOOD PRESSURE: 50 MMHG | HEIGHT: 69 IN
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
## 2025-08-13 DIAGNOSIS — Z95.2 HISTORY OF AORTIC VALVE REPLACEMENT: Primary | ICD-10-CM

## 2025-08-13 DIAGNOSIS — I48.21 PERMANENT ATRIAL FIBRILLATION (MULTI): ICD-10-CM

## 2025-08-13 LAB
INR IN PPP BY COAGULATION ASSAY EXTERNAL: 2.9
PROTHROMBIN TIME (PT) IN PPP BY COAGULATION ASSAY EXTERNAL: NORMAL

## 2025-08-13 PROCEDURE — 99213 OFFICE O/P EST LOW 20 MIN: CPT | Performed by: INTERNAL MEDICINE

## 2025-08-13 ASSESSMENT — ENCOUNTER SYMPTOMS
CARDIOVASCULAR NEGATIVE: 1
OCCASIONAL FEELINGS OF UNSTEADINESS: 1
LOSS OF SENSATION IN FEET: 0
RESPIRATORY NEGATIVE: 1
ARTHRALGIAS: 0
DEPRESSION: 0
GASTROINTESTINAL NEGATIVE: 1

## 2025-08-13 ASSESSMENT — PAIN SCALES - GENERAL: PAINLEVEL_OUTOF10: 0-NO PAIN

## 2025-08-15 ENCOUNTER — OFFICE VISIT (OUTPATIENT)
Dept: CARDIOLOGY | Facility: CLINIC | Age: OVER 89
End: 2025-08-15
Payer: MEDICARE

## 2025-08-15 VITALS
HEART RATE: 64 BPM | DIASTOLIC BLOOD PRESSURE: 64 MMHG | SYSTOLIC BLOOD PRESSURE: 136 MMHG | HEIGHT: 68 IN | OXYGEN SATURATION: 90 % | BODY MASS INDEX: 23.73 KG/M2 | WEIGHT: 156.6 LBS

## 2025-08-15 DIAGNOSIS — Z95.2 HISTORY OF AORTIC VALVE REPLACEMENT: Primary | ICD-10-CM

## 2025-08-15 DIAGNOSIS — I48.21 PERMANENT ATRIAL FIBRILLATION (MULTI): ICD-10-CM

## 2025-08-15 PROCEDURE — 1160F RVW MEDS BY RX/DR IN RCRD: CPT | Performed by: INTERNAL MEDICINE

## 2025-08-15 PROCEDURE — 3078F DIAST BP <80 MM HG: CPT | Performed by: INTERNAL MEDICINE

## 2025-08-15 PROCEDURE — 99212 OFFICE O/P EST SF 10 MIN: CPT

## 2025-08-15 PROCEDURE — 1159F MED LIST DOCD IN RCRD: CPT | Performed by: INTERNAL MEDICINE

## 2025-08-15 PROCEDURE — 1126F AMNT PAIN NOTED NONE PRSNT: CPT | Performed by: INTERNAL MEDICINE

## 2025-08-15 PROCEDURE — 99214 OFFICE O/P EST MOD 30 MIN: CPT | Performed by: INTERNAL MEDICINE

## 2025-08-15 PROCEDURE — 3074F SYST BP LT 130 MM HG: CPT | Performed by: INTERNAL MEDICINE

## 2025-08-15 PROCEDURE — G2211 COMPLEX E/M VISIT ADD ON: HCPCS | Performed by: INTERNAL MEDICINE

## 2025-08-15 ASSESSMENT — PAIN SCALES - GENERAL: PAINLEVEL_OUTOF10: 0-NO PAIN

## 2025-08-15 ASSESSMENT — ENCOUNTER SYMPTOMS
OCCASIONAL FEELINGS OF UNSTEADINESS: 0
MUSCULOSKELETAL NEGATIVE: 1
LOSS OF SENSATION IN FEET: 0
NEUROLOGICAL NEGATIVE: 1
CARDIOVASCULAR NEGATIVE: 1
CONSTITUTIONAL NEGATIVE: 1
RESPIRATORY NEGATIVE: 1
GASTROINTESTINAL NEGATIVE: 1
DEPRESSION: 0

## 2025-08-26 ENCOUNTER — ANTICOAGULATION - WARFARIN VISIT (OUTPATIENT)
Dept: CARDIOLOGY | Facility: CLINIC | Age: OVER 89
End: 2025-08-26
Payer: MEDICARE

## 2025-08-26 DIAGNOSIS — Z95.2 HISTORY OF AORTIC VALVE REPLACEMENT: Primary | ICD-10-CM

## 2025-08-26 DIAGNOSIS — I48.21 PERMANENT ATRIAL FIBRILLATION (MULTI): ICD-10-CM

## 2025-08-26 LAB
INR IN PPP BY COAGULATION ASSAY EXTERNAL: 4.05 (ref 2.5–3.5)
PROTHROMBIN TIME (PT) IN PPP BY COAGULATION ASSAY EXTERNAL: ABNORMAL

## 2025-08-29 ENCOUNTER — TELEPHONE (OUTPATIENT)
Dept: PRIMARY CARE | Facility: CLINIC | Age: OVER 89
End: 2025-08-29
Payer: MEDICARE

## 2025-09-05 ENCOUNTER — ANTICOAGULATION - WARFARIN VISIT (OUTPATIENT)
Dept: CARDIOLOGY | Facility: CLINIC | Age: OVER 89
End: 2025-09-05
Payer: MEDICARE

## 2025-09-05 DIAGNOSIS — I48.21 PERMANENT ATRIAL FIBRILLATION (MULTI): ICD-10-CM

## 2025-09-05 DIAGNOSIS — Z95.2 HISTORY OF AORTIC VALVE REPLACEMENT: Primary | ICD-10-CM

## 2025-09-05 LAB
INR IN PPP BY COAGULATION ASSAY EXTERNAL: 3.25 (ref 2.5–3.5)
PROTHROMBIN TIME (PT) IN PPP BY COAGULATION ASSAY EXTERNAL: NORMAL